# Patient Record
Sex: MALE | Race: WHITE | Employment: UNEMPLOYED | URBAN - METROPOLITAN AREA
[De-identification: names, ages, dates, MRNs, and addresses within clinical notes are randomized per-mention and may not be internally consistent; named-entity substitution may affect disease eponyms.]

---

## 2018-01-05 ENCOUNTER — APPOINTMENT (OUTPATIENT)
Dept: CT IMAGING | Age: 58
DRG: 062 | End: 2018-01-05

## 2018-01-05 ENCOUNTER — APPOINTMENT (OUTPATIENT)
Dept: GENERAL RADIOLOGY | Age: 58
DRG: 062 | End: 2018-01-05

## 2018-01-05 ENCOUNTER — HOSPITAL ENCOUNTER (INPATIENT)
Age: 58
LOS: 5 days | Discharge: NURSING, REHAB OR LONG-TERM CARE | DRG: 062 | End: 2018-01-10
Attending: EMERGENCY MEDICINE | Admitting: INTERNAL MEDICINE

## 2018-01-05 DIAGNOSIS — I63.512 ACUTE ISCHEMIC LEFT MCA STROKE (HCC): Primary | ICD-10-CM

## 2018-01-05 PROBLEM — R29.90 STROKE-LIKE SYMPTOM: Status: ACTIVE | Noted: 2018-01-05

## 2018-01-05 LAB
ABO/RH: NORMAL
ANION GAP SERPL CALCULATED.3IONS-SCNC: 15 MEQ/L (ref 7–13)
ANTIBODY SCREEN: NORMAL
APTT: 28.7 SEC (ref 21.6–35.4)
BUN BLDV-MCNC: 12 MG/DL (ref 6–20)
CALCIUM SERPL-MCNC: 9.3 MG/DL (ref 8.6–10.2)
CHLORIDE BLD-SCNC: 97 MEQ/L (ref 98–107)
CO2: 28 MEQ/L (ref 22–29)
CREAT SERPL-MCNC: 0.84 MG/DL (ref 0.7–1.2)
ETHANOL PERCENT: NORMAL G/DL
ETHANOL: <10 MG/DL (ref 0–0.08)
GFR AFRICAN AMERICAN: >60
GFR NON-AFRICAN AMERICAN: >60
GLUCOSE BLD-MCNC: 145 MG/DL (ref 74–109)
HCT VFR BLD CALC: 48.2 % (ref 42–52)
HEMOGLOBIN: 16.6 G/DL (ref 14–18)
INR BLD: 1
MCH RBC QN AUTO: 30 PG (ref 27–31.3)
MCHC RBC AUTO-ENTMCNC: 34.4 % (ref 33–37)
MCV RBC AUTO: 87.1 FL (ref 80–100)
PDW BLD-RTO: 14 % (ref 11.5–14.5)
PLATELET # BLD: 176 K/UL (ref 130–400)
POTASSIUM SERPL-SCNC: 3.9 MEQ/L (ref 3.5–5.1)
PROTHROMBIN TIME: 10.5 SEC (ref 8.1–13.7)
RBC # BLD: 5.54 M/UL (ref 4.7–6.1)
SODIUM BLD-SCNC: 140 MEQ/L (ref 132–144)
TROPONIN: <0.01 NG/ML (ref 0–0.01)
WBC # BLD: 9.9 K/UL (ref 4.8–10.8)

## 2018-01-05 PROCEDURE — 6360000002 HC RX W HCPCS: Performed by: EMERGENCY MEDICINE

## 2018-01-05 PROCEDURE — 85027 COMPLETE CBC AUTOMATED: CPT

## 2018-01-05 PROCEDURE — 6360000002 HC RX W HCPCS: Performed by: INTERNAL MEDICINE

## 2018-01-05 PROCEDURE — 84484 ASSAY OF TROPONIN QUANT: CPT

## 2018-01-05 PROCEDURE — 70450 CT HEAD/BRAIN W/O DYE: CPT

## 2018-01-05 PROCEDURE — 96375 TX/PRO/DX INJ NEW DRUG ADDON: CPT

## 2018-01-05 PROCEDURE — 93005 ELECTROCARDIOGRAM TRACING: CPT

## 2018-01-05 PROCEDURE — 99285 EMERGENCY DEPT VISIT HI MDM: CPT

## 2018-01-05 PROCEDURE — 71045 X-RAY EXAM CHEST 1 VIEW: CPT

## 2018-01-05 PROCEDURE — 2500000003 HC RX 250 WO HCPCS: Performed by: EMERGENCY MEDICINE

## 2018-01-05 PROCEDURE — 96374 THER/PROPH/DIAG INJ IV PUSH: CPT

## 2018-01-05 PROCEDURE — 80048 BASIC METABOLIC PNL TOTAL CA: CPT

## 2018-01-05 PROCEDURE — 2580000003 HC RX 258: Performed by: EMERGENCY MEDICINE

## 2018-01-05 PROCEDURE — 85610 PROTHROMBIN TIME: CPT

## 2018-01-05 PROCEDURE — 6370000000 HC RX 637 (ALT 250 FOR IP): Performed by: INTERNAL MEDICINE

## 2018-01-05 PROCEDURE — 86850 RBC ANTIBODY SCREEN: CPT

## 2018-01-05 PROCEDURE — 86900 BLOOD TYPING SEROLOGIC ABO: CPT

## 2018-01-05 PROCEDURE — 86901 BLOOD TYPING SEROLOGIC RH(D): CPT

## 2018-01-05 PROCEDURE — G0480 DRUG TEST DEF 1-7 CLASSES: HCPCS

## 2018-01-05 PROCEDURE — 36415 COLL VENOUS BLD VENIPUNCTURE: CPT

## 2018-01-05 PROCEDURE — 2500000003 HC RX 250 WO HCPCS: Performed by: PSYCHIATRY & NEUROLOGY

## 2018-01-05 PROCEDURE — 2580000003 HC RX 258: Performed by: INTERNAL MEDICINE

## 2018-01-05 PROCEDURE — 85730 THROMBOPLASTIN TIME PARTIAL: CPT

## 2018-01-05 PROCEDURE — 2000000000 HC ICU R&B

## 2018-01-05 PROCEDURE — 3E03317 INTRODUCTION OF OTHER THROMBOLYTIC INTO PERIPHERAL VEIN, PERCUTANEOUS APPROACH: ICD-10-PCS | Performed by: EMERGENCY MEDICINE

## 2018-01-05 RX ORDER — LABETALOL HYDROCHLORIDE 5 MG/ML
10 INJECTION, SOLUTION INTRAVENOUS EVERY 4 HOURS PRN
Status: DISCONTINUED | OUTPATIENT
Start: 2018-01-05 | End: 2018-01-10 | Stop reason: HOSPADM

## 2018-01-05 RX ORDER — SODIUM CHLORIDE 0.9 % (FLUSH) 0.9 %
10 SYRINGE (ML) INJECTION PRN
Status: DISCONTINUED | OUTPATIENT
Start: 2018-01-05 | End: 2018-01-10 | Stop reason: HOSPADM

## 2018-01-05 RX ORDER — HYDRALAZINE HYDROCHLORIDE 20 MG/ML
INJECTION INTRAMUSCULAR; INTRAVENOUS
Status: DISPENSED
Start: 2018-01-05 | End: 2018-01-06

## 2018-01-05 RX ORDER — HYDROCHLOROTHIAZIDE 25 MG/1
25 TABLET ORAL DAILY
Status: ON HOLD | COMMUNITY
End: 2018-01-12 | Stop reason: HOSPADM

## 2018-01-05 RX ORDER — ONDANSETRON 2 MG/ML
4 INJECTION INTRAMUSCULAR; INTRAVENOUS EVERY 6 HOURS PRN
Status: DISCONTINUED | OUTPATIENT
Start: 2018-01-05 | End: 2018-01-10 | Stop reason: HOSPADM

## 2018-01-05 RX ORDER — DEXTROSE MONOHYDRATE 25 G/50ML
12.5 INJECTION, SOLUTION INTRAVENOUS
Status: DISPENSED | OUTPATIENT
Start: 2018-01-05 | End: 2018-01-05

## 2018-01-05 RX ORDER — LISINOPRIL 40 MG/1
40 TABLET ORAL DAILY
COMMUNITY
End: 2018-01-24 | Stop reason: SDUPTHER

## 2018-01-05 RX ORDER — ATORVASTATIN CALCIUM 40 MG/1
40 TABLET, FILM COATED ORAL NIGHTLY
Status: DISCONTINUED | OUTPATIENT
Start: 2018-01-05 | End: 2018-01-10 | Stop reason: HOSPADM

## 2018-01-05 RX ORDER — ACETAMINOPHEN 325 MG/1
650 TABLET ORAL EVERY 4 HOURS PRN
Status: DISCONTINUED | OUTPATIENT
Start: 2018-01-05 | End: 2018-01-10 | Stop reason: HOSPADM

## 2018-01-05 RX ORDER — LABETALOL HYDROCHLORIDE 5 MG/ML
10 INJECTION, SOLUTION INTRAVENOUS ONCE
Status: COMPLETED | OUTPATIENT
Start: 2018-01-05 | End: 2018-01-05

## 2018-01-05 RX ORDER — SODIUM CHLORIDE 0.9 % (FLUSH) 0.9 %
10 SYRINGE (ML) INJECTION EVERY 12 HOURS SCHEDULED
Status: DISCONTINUED | OUTPATIENT
Start: 2018-01-05 | End: 2018-01-10 | Stop reason: HOSPADM

## 2018-01-05 RX ORDER — SODIUM CHLORIDE 9 MG/ML
INJECTION, SOLUTION INTRAVENOUS
Status: DISPENSED
Start: 2018-01-05 | End: 2018-01-06

## 2018-01-05 RX ORDER — HYDRALAZINE HYDROCHLORIDE 20 MG/ML
10 INJECTION INTRAMUSCULAR; INTRAVENOUS EVERY 6 HOURS PRN
Status: DISCONTINUED | OUTPATIENT
Start: 2018-01-05 | End: 2018-01-07

## 2018-01-05 RX ADMIN — SODIUM CHLORIDE, PRESERVATIVE FREE 10 ML: 5 INJECTION INTRAVENOUS at 21:19

## 2018-01-05 RX ADMIN — LABETALOL HYDROCHLORIDE 10 MG: 5 INJECTION, SOLUTION INTRAVENOUS at 16:57

## 2018-01-05 RX ADMIN — ALTEPLASE 81 MG: KIT at 16:38

## 2018-01-05 RX ADMIN — LABETALOL HYDROCHLORIDE 10 MG: 5 INJECTION INTRAVENOUS at 21:36

## 2018-01-05 RX ADMIN — SODIUM CHLORIDE, PRESERVATIVE FREE 10 ML: 5 INJECTION INTRAVENOUS at 21:20

## 2018-01-05 RX ADMIN — ATORVASTATIN CALCIUM 40 MG: 40 TABLET, FILM COATED ORAL at 21:19

## 2018-01-05 RX ADMIN — ACETAMINOPHEN 650 MG: 325 TABLET, FILM COATED ORAL at 21:24

## 2018-01-05 RX ADMIN — HYDRALAZINE HYDROCHLORIDE 10 MG: 20 INJECTION INTRAMUSCULAR; INTRAVENOUS at 23:38

## 2018-01-05 ASSESSMENT — PAIN DESCRIPTION - LOCATION
LOCATION: HEAD
LOCATION: HEAD

## 2018-01-05 ASSESSMENT — PAIN SCALES - GENERAL
PAINLEVEL_OUTOF10: 0
PAINLEVEL_OUTOF10: 3
PAINLEVEL_OUTOF10: 0

## 2018-01-05 NOTE — ED TRIAGE NOTES
Per milelr. Pt stated that about an hour ago he began having a hard time finding words for what he wantewd and had confusion finding car keysthat were in front of him. The felt he might have had a slight right sided weakness but then it resolved.  Pt did have a noticeable right sided facial droop on arrival

## 2018-01-05 NOTE — ED NOTES
Bed: 19  Expected date: 1/5/18  Expected time:   Means of arrival:   Comments:  HTN, change in mental status     Minerva Hernandez  01/05/18 3949

## 2018-01-05 NOTE — ED PROVIDER NOTES
INFARCTION AND CLINICAL CORRELATION WARRANTED. 2. NO INTRACRANIAL MASS, HEMORRHAGE, \"MASS EFFECT\" OR MIDLINE SHIFT. 3. REMAINDER OF THE CT BRAIN APPEARS UNREMARKABLE. (CT brain report was discussed with Dr. Gallito Cox at 16:25 hour)      All CT scans at this facility use dose modulation, iterative reconstruction, and/or weight based dosing when appropriate to reduce radiation dose to as low as reasonably achievable. CT HEAD WO CONTRAST    (Results Pending)         ED Course as of Jan 05 1814   Solomon Smith Jan 05, 2018   1638 Dr. Arvella Cockayne present in the emergency department at 36, he was able to evaluate the patient. We agree this patient is still a candidate for TPA. History is somewhat limited because the patient is confused and no family member is currently present in the ED, however, we obtain history from EMS who states they obtained from the girlfriend present at the house. It was an acute onset within one hour. NIH 6 with clear clinical deficit. TPA was initiated at [id number] which was 30 minutes after arrival.  [TS]      ED Course User Index  [TS] Jona Landaverde DO       Patient received the TPA without incident, slight improvement noted. He will get CT scan to make sure he did not develop any bleed and then go up to ICU. I did speak with the hospitalist.  Dr. Arvella Cockayne where. Family present. Critical Care: The high probability of sudden, clinically significant deterioration in the patient's condition required the highest level of my preparedness to intervene urgently. The services I provided to this patient were to treat and/or prevent clinically significant deterioration.  Services included the following: chart data review, reviewing nursing notes and/or old charts, documentation time, consultant collaboration regarding findings and treatment options, medication orders and management, direct patient care, vital sign assessments and ordering, interpreting and reviewing diagnostic studies/lab

## 2018-01-05 NOTE — ED NOTES
[facility]  01/05/18 0415

## 2018-01-06 ENCOUNTER — APPOINTMENT (OUTPATIENT)
Dept: MRI IMAGING | Age: 58
DRG: 062 | End: 2018-01-06

## 2018-01-06 ENCOUNTER — APPOINTMENT (OUTPATIENT)
Dept: ULTRASOUND IMAGING | Age: 58
DRG: 062 | End: 2018-01-06

## 2018-01-06 ENCOUNTER — APPOINTMENT (OUTPATIENT)
Dept: CT IMAGING | Age: 58
DRG: 062 | End: 2018-01-06

## 2018-01-06 LAB
ANION GAP SERPL CALCULATED.3IONS-SCNC: 16 MEQ/L (ref 7–13)
BASOPHILS ABSOLUTE: 0.1 K/UL (ref 0–0.2)
BASOPHILS RELATIVE PERCENT: 0.9 %
BUN BLDV-MCNC: 13 MG/DL (ref 6–20)
CALCIUM SERPL-MCNC: 8.9 MG/DL (ref 8.6–10.2)
CHLORIDE BLD-SCNC: 97 MEQ/L (ref 98–107)
CHOLESTEROL, TOTAL: 165 MG/DL (ref 0–199)
CO2: 23 MEQ/L (ref 22–29)
CREAT SERPL-MCNC: 0.83 MG/DL (ref 0.7–1.2)
EOSINOPHILS ABSOLUTE: 0 K/UL (ref 0–0.7)
EOSINOPHILS RELATIVE PERCENT: 0.1 %
GFR AFRICAN AMERICAN: >60
GFR NON-AFRICAN AMERICAN: >60
GLUCOSE BLD-MCNC: 137 MG/DL (ref 74–109)
HBA1C MFR BLD: 5.5 % (ref 4.8–5.9)
HCT VFR BLD CALC: 44.1 % (ref 42–52)
HDLC SERPL-MCNC: 29 MG/DL (ref 40–59)
HEMOGLOBIN: 15.2 G/DL (ref 14–18)
INR BLD: 1.1
LDL CHOLESTEROL CALCULATED: 110 MG/DL (ref 0–129)
LV EF: 53 %
LVEF MODALITY: NORMAL
LYMPHOCYTES ABSOLUTE: 1.2 K/UL (ref 1–4.8)
LYMPHOCYTES RELATIVE PERCENT: 10.9 %
MCH RBC QN AUTO: 29.8 PG (ref 27–31.3)
MCHC RBC AUTO-ENTMCNC: 34.5 % (ref 33–37)
MCV RBC AUTO: 86.6 FL (ref 80–100)
MONOCYTES ABSOLUTE: 0.6 K/UL (ref 0.2–0.8)
MONOCYTES RELATIVE PERCENT: 5.4 %
NEUTROPHILS ABSOLUTE: 9 K/UL (ref 1.4–6.5)
NEUTROPHILS RELATIVE PERCENT: 82.7 %
PDW BLD-RTO: 14.2 % (ref 11.5–14.5)
PLATELET # BLD: 177 K/UL (ref 130–400)
POTASSIUM SERPL-SCNC: 3.7 MEQ/L (ref 3.5–5.1)
PROTHROMBIN TIME: 11.5 SEC (ref 8.1–13.7)
RBC # BLD: 5.09 M/UL (ref 4.7–6.1)
SODIUM BLD-SCNC: 136 MEQ/L (ref 132–144)
TRIGL SERPL-MCNC: 128 MG/DL (ref 0–200)
TROPONIN: 0.03 NG/ML (ref 0–0.01)
TROPONIN: 0.03 NG/ML (ref 0–0.01)
WBC # BLD: 10.9 K/UL (ref 4.8–10.8)

## 2018-01-06 PROCEDURE — 2700000000 HC OXYGEN THERAPY PER DAY

## 2018-01-06 PROCEDURE — 85610 PROTHROMBIN TIME: CPT

## 2018-01-06 PROCEDURE — 2580000003 HC RX 258: Performed by: EMERGENCY MEDICINE

## 2018-01-06 PROCEDURE — 70450 CT HEAD/BRAIN W/O DYE: CPT

## 2018-01-06 PROCEDURE — 70551 MRI BRAIN STEM W/O DYE: CPT

## 2018-01-06 PROCEDURE — 2000000000 HC ICU R&B

## 2018-01-06 PROCEDURE — 93880 EXTRACRANIAL BILAT STUDY: CPT

## 2018-01-06 PROCEDURE — 83036 HEMOGLOBIN GLYCOSYLATED A1C: CPT

## 2018-01-06 PROCEDURE — 2500000003 HC RX 250 WO HCPCS: Performed by: PSYCHIATRY & NEUROLOGY

## 2018-01-06 PROCEDURE — 85025 COMPLETE CBC W/AUTO DIFF WBC: CPT

## 2018-01-06 PROCEDURE — G8997 SWALLOW GOAL STATUS: HCPCS

## 2018-01-06 PROCEDURE — 84484 ASSAY OF TROPONIN QUANT: CPT

## 2018-01-06 PROCEDURE — G8996 SWALLOW CURRENT STATUS: HCPCS

## 2018-01-06 PROCEDURE — 6360000002 HC RX W HCPCS: Performed by: INTERNAL MEDICINE

## 2018-01-06 PROCEDURE — 36415 COLL VENOUS BLD VENIPUNCTURE: CPT

## 2018-01-06 PROCEDURE — 70544 MR ANGIOGRAPHY HEAD W/O DYE: CPT

## 2018-01-06 PROCEDURE — 6370000000 HC RX 637 (ALT 250 FOR IP): Performed by: INTERNAL MEDICINE

## 2018-01-06 PROCEDURE — 80061 LIPID PANEL: CPT

## 2018-01-06 PROCEDURE — 93306 TTE W/DOPPLER COMPLETE: CPT

## 2018-01-06 PROCEDURE — 2580000003 HC RX 258: Performed by: INTERNAL MEDICINE

## 2018-01-06 PROCEDURE — 92610 EVALUATE SWALLOWING FUNCTION: CPT

## 2018-01-06 PROCEDURE — 93005 ELECTROCARDIOGRAM TRACING: CPT

## 2018-01-06 PROCEDURE — 6370000000 HC RX 637 (ALT 250 FOR IP): Performed by: PSYCHIATRY & NEUROLOGY

## 2018-01-06 PROCEDURE — 80048 BASIC METABOLIC PNL TOTAL CA: CPT

## 2018-01-06 RX ORDER — LISINOPRIL 5 MG/1
5 TABLET ORAL DAILY
Status: DISCONTINUED | OUTPATIENT
Start: 2018-01-06 | End: 2018-01-07

## 2018-01-06 RX ADMIN — LISINOPRIL 5 MG: 5 TABLET ORAL at 14:27

## 2018-01-06 RX ADMIN — LABETALOL HYDROCHLORIDE 10 MG: 5 INJECTION INTRAVENOUS at 16:11

## 2018-01-06 RX ADMIN — LABETALOL HYDROCHLORIDE 10 MG: 5 INJECTION INTRAVENOUS at 04:05

## 2018-01-06 RX ADMIN — SODIUM CHLORIDE, PRESERVATIVE FREE 10 ML: 5 INJECTION INTRAVENOUS at 21:41

## 2018-01-06 RX ADMIN — LABETALOL HYDROCHLORIDE 10 MG: 5 INJECTION INTRAVENOUS at 23:08

## 2018-01-06 RX ADMIN — HYDRALAZINE HYDROCHLORIDE 10 MG: 20 INJECTION INTRAMUSCULAR; INTRAVENOUS at 18:01

## 2018-01-06 RX ADMIN — HYDRALAZINE HYDROCHLORIDE 10 MG: 20 INJECTION INTRAMUSCULAR; INTRAVENOUS at 06:11

## 2018-01-06 RX ADMIN — ATORVASTATIN CALCIUM 40 MG: 40 TABLET, FILM COATED ORAL at 21:40

## 2018-01-06 ASSESSMENT — PAIN SCALES - GENERAL
PAINLEVEL_OUTOF10: 0

## 2018-01-06 NOTE — PROGRESS NOTES
[facility] Occupational Therapy      Date: 2018  Patient Name: Jase Bishop        MRN: [id number]  Account: [de-identified]   : 1960  (65 y.o.)  Room: [place] [address]     Chart reviewed, attempted OT eval at 4:15 PM, but pt. unavailable 2° to:    [x] Hold, tPA administered at 18 16:38.     [] Pt declined     [] Pt. . off floor for test/procedure. Will attempt again when able.     Electronically signed by JANN Christina on 2018 at 4:15 PM

## 2018-01-06 NOTE — PROGRESS NOTES
Progress Note  NEUROLOGY  [facility] ICU       Patient: Peter Israel  Unit/Bed: FI68/SX12-09  YOB: 1960  MRN: [id number]  Acct: [de-identified]   Admitting Diagnosis: Stroke-like symptom [R29.90]  Admit Date:  1/5/2018  Hospital Day: 1    Subjective: Has remained neurologically stable, he continues to have a mixed aphasia. He does have a little headache. Overnight his blood pressure was quite elevated. Patient Seen, Chart, Labs, Radiology studies, and Consults reviewed. Objective:   BP (!) 176/69   Pulse 72   Temp 98.7 °F (37.1 °C) (Oral)   Resp 20   Wt 210 lb 1.6 oz (95.3 kg)   SpO2 97%     Intake/Output Summary (Last 24 hours) at 01/06/18 1457  Last data filed at 01/06/18 1009   Gross per 24 hour   Intake              450 ml   Output              600 ml   Net             -150 ml       Physical Exam:  GENERAL APPEARANCE: No distress, Prefers to keep his eyes closed, interactive and cooperative. MENTAL STATE: His aphasia is limiting. He attempts to follow commands, he is calm. He can follow mimed exam gestures. He cannot follow one-step commands reliably. He is a little more successful with overlearned phrases, however spontaneous expression and significantly impaired. Cannot name, cannot repeat. CRANIAL NERVES: Are normal  CN 2 Visual fields full to confrontation. CN 3, 4, 6 Pupils round, equally reactive to light. No ptosis. EOMs normal alignment No nystagmus. CN 5 Facial sensation intact bilaterally. CN 7 Normal and symmetric facial strength. Nasolabial folds symmetric. CN 8 untestable due to aphasia  CN 9 untestable due to aphasia. CN 11 does not follow command   CN 12 does not follow command    MOTOR:Muscle strength was 5/5 in distal and proximal muscles in both upper and lower extremities. No fasciculations, tremor or other abnormal movements were present.      SENSORY: Sensory exam was untestable to take aphasia    COORDINATION: Coordination exam was untestable due to aphasia        Medications:    lisinopril  5 mg Oral Daily    nicotine  1 patch Transdermal Daily    sodium chloride flush  10 mL Intravenous 2 times per day    sodium chloride flush  10 mL Intravenous 2 times per day    atorvastatin  40 mg Oral Nightly     Continuous Infusions:   PRN Meds:sodium chloride flush, sodium chloride flush, acetaminophen, magnesium hydroxide, ondansetron, labetalol, hydrALAZINE    LABS  CBC:   Recent Labs      01/05/18   1619  01/06/18   0532   WBC  9.9  10.9*   HGB  16.6  15.2   PLT  176  177     BMP:  Recent Labs      01/05/18   1615  01/06/18   0532   NA  140  136   K  3.9  3.7   CL  97*  97*   CO2  28  23   BUN  12  13   CREATININE  0.84  0.83   GLUCOSE  145*  137*     TSH:  No results for input(s): TSH in the last 72 hours. B12:  No results for input(s): Chichi Speller in the last 72 hours. Vit. D: No results for input(s): VITD25 in the last 72 hours. Lipids:   Lab Results   Component Value Date    CHOL 165 01/06/2018     Lab Results   Component Value Date    TRIG 128 01/06/2018     Lab Results   Component Value Date    HDL 29 (L) 01/06/2018     Lab Results   Component Value Date    LDLCALC 110 01/06/2018     No results found for: LABVLDL, VLDL  No results found for: CHOLHDLRATIO    Ammonia:No results for input(s): AMMONIA in the last 72 hours. LFT: No results for input(s): AST, ALT, ALB, BILITOT, ALKPHOS in the last 72 hours. Urine: No results for input(s): Dickie Duenweg, GLUCOSEU, BLOODU, PHUR, PROTEINU, UROBILINOGEN, LEUKOCYTESUR in the last 72 hours. Invalid input(s):  Rene Herzog,  SPECGRAV,  NITRU     Assessment/Plan:  Characteristics of diminished attention, mixed aphasia, normal motor exam except for maybe a questionable left alien hand, make me suspicious of a subcortical stroke for example a right thalamic or ischemia involving the head of the caudate, or he may have a few small embolic areas of stroke. He received tPA on 1/5/18.   We will proceed

## 2018-01-06 NOTE — PLAN OF CARE
Problem: IP SWALLOWING  Goal: STG - patient will tolerate recommended food and liquid consistencies without clinical signs and symptoms of aspirations  Outcome: Ongoing

## 2018-01-06 NOTE — PROGRESS NOTES
Peripheral Pulses: +2 palpable, equal bilaterally       Labs:   Recent Labs      01/05/18   1619  01/06/18   0532   WBC  9.9  10.9*   HGB  16.6  15.2   HCT  48.2  44.1   PLT  176  177     Recent Labs      01/05/18   1615  01/06/18   0532   NA  140  136   K  3.9  3.7   CL  97*  97*   CO2  28  23   BUN  12  13   CREATININE  0.84  0.83   CALCIUM  9.3  8.9     No results for input(s): AST, ALT, BILIDIR, BILITOT, ALKPHOS in the last 72 hours. Recent Labs      01/05/18   1615  01/06/18   0532   INR  1.0  1.1     Recent Labs      01/05/18   1615   TROPONINI  <0.010       Urinalysis:    No results found for: Alejandra Farah, BACTERIA, RBCUA, BLOODU, SPECGRAV, GLUCOSEU    Radiology:  CT HEAD WO CONTRAST   Final Result   Persistent area of low attenuation left parietal lobe the compatible with an area of infarction. No hemorrhagic transformation. Question possible left MCA clot sign. These findings were discussed with Dr. Cony Belle in the emergency room. XR CHEST PORTABLE   Final Result   MINIMAL BIBASILAR ATELECTASIS/SMALL INFILTRATES. CLINICAL CORRELATION AND FOLLOW-UP PLAIN FILM RECOMMENDED. CT Head WO Contrast   Final Result   1. VAGUE HYPOATTENUATION FOCUS IN LT PARIETAL PERIVENTRICULAR WHITE MATTER IS SUSPICIOUS FOR AN ACUTE OR SUBACUTE NONHEMORRHAGIC CEREBRAL INFARCTION AND CLINICAL CORRELATION WARRANTED. 2. NO INTRACRANIAL MASS, HEMORRHAGE, \"MASS EFFECT\" OR MIDLINE SHIFT. 3. REMAINDER OF THE CT BRAIN APPEARS UNREMARKABLE. (CT brain report was discussed with Dr. Cony Belle at 16:25 hour)      All CT scans at this facility use dose modulation, iterative reconstruction, and/or weight based dosing when appropriate to reduce radiation dose to as low as reasonably achievable.       US CAROTID ARTERY BILATERAL    (Results Pending)   MRA HEAD WO CONTRAST    (Results Pending)   MRI BRAIN WO CONTRAST    (Results Pending)           Assessment/Plan:    Active Hospital Problems    Diagnosis Date Noted  Stroke-like symptom [R29.90] 01/05/2018      # acute CVA- s/p tPA, repeat CT H not concerning for bleed, awaiting MRI/A head and carotic U/S, neurology is following  - tele, neuro-check, speech therapy, SLP evaluated and rec'ed Pureed with nectar thick liquids. Start statin  - f/u imaging as above   - treat BP >180/105 with prn meds ordered    # DVT PPx- hold AC until ok by neurology    # Dispo- ICU       Additional work up or/and treatment plan may be added today or then after based on clinical progression. I am managing a portion of pt care. Some medical issues are handled by other specialists. Additional work up and treatment should be done in out pt setting by pt PCP and other out pt providers. In addition to examining and evaluating pt, I spent additional time explaining care, normal and abnormal findings, and treatment plan. All of pt questions were answered. Counseling, diet and education were  provided. Case will be discussed with nursing staff when appropriate. Family will be updated if and when appropriate.       Diet: Diet NPO Effective Now    Code Status: Full Code    PT/OT Eval     Electronically signed by Faheem Gusman DO on 1/6/2018 at 1:42 PM

## 2018-01-07 LAB
ANION GAP SERPL CALCULATED.3IONS-SCNC: 18 MEQ/L (ref 7–13)
BUN BLDV-MCNC: 23 MG/DL (ref 6–20)
CALCIUM SERPL-MCNC: 8.7 MG/DL (ref 8.6–10.2)
CHLORIDE BLD-SCNC: 97 MEQ/L (ref 98–107)
CO2: 22 MEQ/L (ref 22–29)
CREAT SERPL-MCNC: 0.93 MG/DL (ref 0.7–1.2)
GFR AFRICAN AMERICAN: >60
GFR NON-AFRICAN AMERICAN: >60
GLUCOSE BLD-MCNC: 128 MG/DL (ref 74–109)
POTASSIUM SERPL-SCNC: 3.8 MEQ/L (ref 3.5–5.1)
SODIUM BLD-SCNC: 137 MEQ/L (ref 132–144)
TROPONIN: 0.01 NG/ML (ref 0–0.01)

## 2018-01-07 PROCEDURE — 2580000003 HC RX 258: Performed by: EMERGENCY MEDICINE

## 2018-01-07 PROCEDURE — G8987 SELF CARE CURRENT STATUS: HCPCS

## 2018-01-07 PROCEDURE — G8979 MOBILITY GOAL STATUS: HCPCS

## 2018-01-07 PROCEDURE — 97163 PT EVAL HIGH COMPLEX 45 MIN: CPT

## 2018-01-07 PROCEDURE — G8978 MOBILITY CURRENT STATUS: HCPCS

## 2018-01-07 PROCEDURE — G8988 SELF CARE GOAL STATUS: HCPCS

## 2018-01-07 PROCEDURE — 2000000000 HC ICU R&B

## 2018-01-07 PROCEDURE — 2580000003 HC RX 258

## 2018-01-07 PROCEDURE — 2500000003 HC RX 250 WO HCPCS: Performed by: INTERNAL MEDICINE

## 2018-01-07 PROCEDURE — 97166 OT EVAL MOD COMPLEX 45 MIN: CPT

## 2018-01-07 PROCEDURE — 51702 INSERT TEMP BLADDER CATH: CPT

## 2018-01-07 PROCEDURE — 2580000003 HC RX 258: Performed by: INTERNAL MEDICINE

## 2018-01-07 PROCEDURE — 80048 BASIC METABOLIC PNL TOTAL CA: CPT

## 2018-01-07 PROCEDURE — 6370000000 HC RX 637 (ALT 250 FOR IP): Performed by: PSYCHIATRY & NEUROLOGY

## 2018-01-07 PROCEDURE — 6360000002 HC RX W HCPCS: Performed by: INTERNAL MEDICINE

## 2018-01-07 PROCEDURE — 36415 COLL VENOUS BLD VENIPUNCTURE: CPT

## 2018-01-07 PROCEDURE — 84484 ASSAY OF TROPONIN QUANT: CPT

## 2018-01-07 PROCEDURE — 2500000003 HC RX 250 WO HCPCS: Performed by: PSYCHIATRY & NEUROLOGY

## 2018-01-07 RX ORDER — LISINOPRIL 10 MG/1
10 TABLET ORAL DAILY
Status: DISCONTINUED | OUTPATIENT
Start: 2018-01-08 | End: 2018-01-08

## 2018-01-07 RX ORDER — SODIUM CHLORIDE 9 MG/ML
INJECTION, SOLUTION INTRAVENOUS CONTINUOUS
Status: DISCONTINUED | OUTPATIENT
Start: 2018-01-07 | End: 2018-01-09

## 2018-01-07 RX ORDER — SODIUM CHLORIDE 9 MG/ML
INJECTION, SOLUTION INTRAVENOUS
Status: COMPLETED
Start: 2018-01-07 | End: 2018-01-07

## 2018-01-07 RX ORDER — ASPIRIN 81 MG/1
81 TABLET, CHEWABLE ORAL DAILY
Status: DISCONTINUED | OUTPATIENT
Start: 2018-01-07 | End: 2018-01-08

## 2018-01-07 RX ORDER — METOPROLOL TARTRATE 5 MG/5ML
5 INJECTION INTRAVENOUS EVERY 6 HOURS
Status: DISCONTINUED | OUTPATIENT
Start: 2018-01-07 | End: 2018-01-10 | Stop reason: HOSPADM

## 2018-01-07 RX ORDER — LISINOPRIL 5 MG/1
5 TABLET ORAL DAILY
Status: DISPENSED | OUTPATIENT
Start: 2018-01-07 | End: 2018-01-08

## 2018-01-07 RX ORDER — HYDRALAZINE HYDROCHLORIDE 20 MG/ML
20 INJECTION INTRAMUSCULAR; INTRAVENOUS EVERY 6 HOURS
Status: DISCONTINUED | OUTPATIENT
Start: 2018-01-07 | End: 2018-01-10 | Stop reason: HOSPADM

## 2018-01-07 RX ADMIN — SODIUM CHLORIDE: 9 INJECTION, SOLUTION INTRAVENOUS at 10:10

## 2018-01-07 RX ADMIN — METOPROLOL TARTRATE 5 MG: 5 INJECTION, SOLUTION INTRAVENOUS at 15:57

## 2018-01-07 RX ADMIN — HYDRALAZINE HYDROCHLORIDE 10 MG: 20 INJECTION INTRAMUSCULAR; INTRAVENOUS at 10:09

## 2018-01-07 RX ADMIN — LABETALOL HYDROCHLORIDE 10 MG: 5 INJECTION INTRAVENOUS at 22:29

## 2018-01-07 RX ADMIN — SODIUM CHLORIDE, PRESERVATIVE FREE 10 ML: 5 INJECTION INTRAVENOUS at 08:16

## 2018-01-07 RX ADMIN — SODIUM CHLORIDE: 9 INJECTION, SOLUTION INTRAVENOUS at 20:06

## 2018-01-07 RX ADMIN — METOPROLOL TARTRATE 5 MG: 5 INJECTION, SOLUTION INTRAVENOUS at 21:30

## 2018-01-07 RX ADMIN — HYDRALAZINE HYDROCHLORIDE 20 MG: 20 INJECTION INTRAMUSCULAR; INTRAVENOUS at 23:52

## 2018-01-07 RX ADMIN — HYDRALAZINE HYDROCHLORIDE 20 MG: 20 INJECTION INTRAMUSCULAR; INTRAVENOUS at 18:07

## 2018-01-07 RX ADMIN — SODIUM CHLORIDE, PRESERVATIVE FREE 10 ML: 5 INJECTION INTRAVENOUS at 08:15

## 2018-01-07 RX ADMIN — HYDRALAZINE HYDROCHLORIDE 10 MG: 20 INJECTION INTRAMUSCULAR; INTRAVENOUS at 00:05

## 2018-01-07 ASSESSMENT — PAIN SCALES - GENERAL
PAINLEVEL_OUTOF10: 0

## 2018-01-07 NOTE — PROGRESS NOTES
SENSORY: Sensory exam was untestable to take aphasia     COORDINATION: Coordination exam was untestable due to aphasia         Medications:    aspirin  81 mg Oral Daily    [START ON 1/8/2018] lisinopril  10 mg Oral Daily    lisinopril  5 mg Oral Daily    nicotine  1 patch Transdermal Daily    sodium chloride flush  10 mL Intravenous 2 times per day    sodium chloride flush  10 mL Intravenous 2 times per day    atorvastatin  40 mg Oral Nightly     Continuous Infusions:   sodium chloride 100 mL/hr at 01/07/18 1010     PRN Meds:sodium chloride flush, sodium chloride flush, acetaminophen, magnesium hydroxide, ondansetron, labetalol, hydrALAZINE    LABS  CBC:   Recent Labs      01/05/18   1619  01/06/18   0532   WBC  9.9  10.9*   HGB  16.6  15.2   PLT  176  177     BMP:  Recent Labs      01/05/18   1615  01/06/18   0532  01/07/18   0606   NA  140  136  137   K  3.9  3.7  3.8   CL  97*  97*  97*   CO2  28  23  22   BUN  12  13  23*   CREATININE  0.84  0.83  0.93   GLUCOSE  145*  137*  128*     TSH:  No results for input(s): TSH in the last 72 hours. B12:  No results for input(s): Amrita Ifrah in the last 72 hours. Vit. D: No results for input(s): VITD25 in the last 72 hours. Lipids:   Lab Results   Component Value Date    CHOL 165 01/06/2018     Lab Results   Component Value Date    TRIG 128 01/06/2018     Lab Results   Component Value Date    HDL 29 (L) 01/06/2018     Lab Results   Component Value Date    LDLCALC 110 01/06/2018     No results found for: LABVLDL, VLDL  No results found for: CHOLHDLRATIO    Ammonia:No results for input(s): AMMONIA in the last 72 hours. LFT: No results for input(s): AST, ALT, ALB, BILITOT, ALKPHOS in the last 72 hours. Urine: No results for input(s): Mckayla Cocks, GLUCOSEU, BLOODU, PHUR, PROTEINU, UROBILINOGEN, LEUKOCYTESUR in the last 72 hours.     Invalid input(s):  Johnrachael Wellington,  SPECGRAV,  NITRU     Assessment/Plan:  Characteristics of diminished

## 2018-01-07 NOTE — PLAN OF CARE
Problem: Falls - Risk of  Goal: Absence of falls  Outcome: Ongoing      Problem: Risk for Impaired Skin Integrity  Goal: Tissue integrity - skin and mucous membranes  Structural intactness and normal physiological function of skin and  mucous membranes.    Outcome: Ongoing      Problem: NEUROLOGICAL DEFICIT  Goal: Absence of continued neurologic deterioration signs and symptoms  Outcome: Ongoing      Problem: IP SWALLOWING  Goal: LTG - patient will tolerate the least restrictive diet consistency to allow for safe consumption of daily meals  Outcome: Ongoing    Goal: STG - patient will tolerate recommended food and liquid consistencies without clinical signs and symptoms of aspirations  Outcome: Ongoing

## 2018-01-07 NOTE — PROGRESS NOTES
treatment minutes:  20 minutes  Total treatment time/minutes:  20 minutes    Electronically signed by:    JANN Corrales  1/7/2018, 9:27 AM

## 2018-01-07 NOTE — PROGRESS NOTES
Hospitalist Progress Note      PCP: No primary care provider on file. Date of Admission: 1/5/2018    Chief Complaint:    Chief Complaint   Patient presents with    Altered Mental Status       HPI/ subjective   Pt is confused with dysarthria. Poor historian. Medications:  Reviewed    Infusion Medications    sodium chloride 100 mL/hr at 01/07/18 1010     Scheduled Medications    lisinopril  5 mg Oral Daily    nicotine  1 patch Transdermal Daily    sodium chloride flush  10 mL Intravenous 2 times per day    sodium chloride flush  10 mL Intravenous 2 times per day    atorvastatin  40 mg Oral Nightly     PRN Meds: sodium chloride flush, sodium chloride flush, acetaminophen, magnesium hydroxide, ondansetron, labetalol, hydrALAZINE      Intake/Output Summary (Last 24 hours) at 01/07/18 1057  Last data filed at 01/07/18 0600   Gross per 24 hour   Intake                0 ml   Output              425 ml   Net             -425 ml       Exam:    BP (!) 195/90   Pulse 63   Temp 99.5 °F (37.5 °C) (Oral)   Resp 19   Wt 210 lb 1.6 oz (95.3 kg)   SpO2 97%     General appearance: No apparent distress, appears stated age and cooperative. HEENT: Pupils equal, round, and reactive to light. Conjunctivae/corneas clear. Neck: Supple, with full range of motion. No jugular venous distention. Trachea midline. Respiratory:  Normal respiratory effort. Clear to auscultation, bilaterally without Rales/Wheezes/Rhonchi. Cardiovascular: Regular rate and rhythm with normal S1/S2 without murmurs, rubs or gallops. Abdomen: Soft, non-tender, non-distended with normal bowel sounds. Musculoskeletal: No clubbing, cyanosis or edema bilaterally. Full range of motion without deformity. Skin: Skin color, texture, turgor normal.  No rashes or lesions.   Neuro: mild right facial droop, word salad, difficulty finding words, difficulty with following commands, moving all extremities   Psychiatric: Alert and oriented, thought content done pending read, neurology is following  - tele, neuro-check, speech therapy, SLP evaluated and rec'ed Pureed with nectar thick liquids. Start statin  - MRI with lacunar infarct, acute vs subacute   - treat BP >180/105 with prn meds ordered    # DVT PPx- hold AC until ok by neurology    # Dispo- ICU, possible floor transfer today pending neurology rec's      Additional work up or/and treatment plan may be added today or then after based on clinical progression. I am managing a portion of pt care. Some medical issues are handled by other specialists. Additional work up and treatment should be done in out pt setting by pt PCP and other out pt providers. In addition to examining and evaluating pt, I spent additional time explaining care, normal and abnormal findings, and treatment plan. All of pt questions were answered. Counseling, diet and education were  provided. Case will be discussed with nursing staff when appropriate. Family will be updated if and when appropriate.       Diet: Diet NPO Effective Now    Code Status: Full Code    PT/OT Eval     Electronically signed by Anya Jane DO on 1/7/2018 at 10:57 AM

## 2018-01-07 NOTE — PROGRESS NOTES
Speech Language Pathology    NAME:  Dianelys Contreras  ROOM: [facility]14/[facility]14-01  :  1960  DATE: 2018      Speech Therapy attempted to see Dianelys Contreras on this date for a/an:    [] Bedside Swallow Evaluation   [x] Speech/Language Evaluation   [] Modified Barium Swallow   [] Treatment    Pt was unable to be seen due to patient:   [] Currently off unit   [] Refused   [x] Too lethargic to participate    [] NPO for testing   [] On Bipap   [x] Nursing Deferred: Pop Garza RN reported pt currently cannot remain alert and is not following commands. Pt is NPO due to unable to remain alert. To re-assess swallow as able and to assess speech/language  as able. [] Other:        Electronically signed by Florencio Henson.  CLAIRE Iglesias on 18 at 8:46 AM

## 2018-01-07 NOTE — PROGRESS NOTES
assistance  Supine to Sit: Moderate assistance  Sit to Supine: Contact guard assistance  Scooting: Moderate assistance  Comment: pt had difficulty following directions therefore physical assist was required primarily for tactile cueing for direction  Transfers  Sit to Stand: Minimal Assistance  Stand to sit: Minimal Assistance  Bed to Chair: Minimal assistance  Stand Pivot Transfers: Minimal Assistance  Ambulation  Ambulation?: Yes  WB Status: unrestricted  Ambulation 1  Surface: level tile  Device: No Device;Hand-Held Assist  Assistance: Minimal assistance  Quality of Gait: asymmetrical and imbalanced. ataxic gait  Distance: 5 feet  Comments: impulsive  Stairs/Curb  Stairs?: No     Balance  Posture: Fair  Sitting - Static: Good;-  Sitting - Dynamic: Good;-  Standing - Static: Fair;+  Standing - Dynamic: Fair  Comments: pt impulsive and unsafe, use tactile and verbal cues        Assessment   Body structures, Functions, Activity limitations: Decreased functional mobility ; Decreased endurance;Decreased ADL status; Decreased balance;Decreased strength;Decreased safe awareness;Decreased cognition  Assessment: Pt referred for difficulty walking s/p CVA. Pt requires min to mod assist due to the need for tactile cues for direction. Pt inconsistently follows directions and is very impulsive and unsafe. Progress as tolerated.    Treatment Diagnosis: difficulty walking  Prognosis: Good  Decision Making: High Complexity  Patient Education: PT POC  Barriers to Learning: cognition  REQUIRES PT FOLLOW UP: Yes  Activity Tolerance  Activity Tolerance: Patient limited by cognitive status     Discharge Recommendations:  Continue to assess pending progress      Plan   Plan  Times per week: 3-6  Current Treatment Recommendations: Strengthening, Neuromuscular Re-education, Home Exercise Program, Balance Training, Safety Education & Training, Manual Therapy - Soft Tissue Mobilization, Endurance Training, Patient/Caregiver Education &

## 2018-01-08 LAB
ANION GAP SERPL CALCULATED.3IONS-SCNC: 17 MEQ/L (ref 7–13)
BUN BLDV-MCNC: 29 MG/DL (ref 6–20)
C-REACTIVE PROTEIN: 15.2 MG/L (ref 0–5)
CALCIUM SERPL-MCNC: 8.6 MG/DL (ref 8.6–10.2)
CHLORIDE BLD-SCNC: 102 MEQ/L (ref 98–107)
CO2: 22 MEQ/L (ref 22–29)
CREAT SERPL-MCNC: 0.81 MG/DL (ref 0.7–1.2)
GFR AFRICAN AMERICAN: >60
GFR NON-AFRICAN AMERICAN: >60
GLUCOSE BLD-MCNC: 108 MG/DL (ref 74–109)
HCT VFR BLD CALC: 43.7 % (ref 42–52)
HEMOGLOBIN: 14.7 G/DL (ref 14–18)
MCH RBC QN AUTO: 29.5 PG (ref 27–31.3)
MCHC RBC AUTO-ENTMCNC: 33.5 % (ref 33–37)
MCV RBC AUTO: 88.1 FL (ref 80–100)
PDW BLD-RTO: 14.8 % (ref 11.5–14.5)
PLATELET # BLD: 174 K/UL (ref 130–400)
POTASSIUM SERPL-SCNC: 3.8 MEQ/L (ref 3.5–5.1)
RBC # BLD: 4.96 M/UL (ref 4.7–6.1)
SEDIMENTATION RATE, ERYTHROCYTE: 25 MM (ref 0–20)
SODIUM BLD-SCNC: 141 MEQ/L (ref 132–144)
TOTAL CK: 68 U/L (ref 0–190)
TROPONIN: 0.01 NG/ML (ref 0–0.01)
WBC # BLD: 10.4 K/UL (ref 4.8–10.8)

## 2018-01-08 PROCEDURE — 6370000000 HC RX 637 (ALT 250 FOR IP): Performed by: INTERNAL MEDICINE

## 2018-01-08 PROCEDURE — 97535 SELF CARE MNGMENT TRAINING: CPT

## 2018-01-08 PROCEDURE — 36415 COLL VENOUS BLD VENIPUNCTURE: CPT

## 2018-01-08 PROCEDURE — 6360000002 HC RX W HCPCS: Performed by: INTERNAL MEDICINE

## 2018-01-08 PROCEDURE — 93010 ELECTROCARDIOGRAM REPORT: CPT | Performed by: INTERNAL MEDICINE

## 2018-01-08 PROCEDURE — 93005 ELECTROCARDIOGRAM TRACING: CPT

## 2018-01-08 PROCEDURE — 97116 GAIT TRAINING THERAPY: CPT

## 2018-01-08 PROCEDURE — 2500000003 HC RX 250 WO HCPCS: Performed by: INTERNAL MEDICINE

## 2018-01-08 PROCEDURE — 86140 C-REACTIVE PROTEIN: CPT

## 2018-01-08 PROCEDURE — 1210000000 HC MED SURG R&B

## 2018-01-08 PROCEDURE — 2580000003 HC RX 258: Performed by: INTERNAL MEDICINE

## 2018-01-08 PROCEDURE — 6370000000 HC RX 637 (ALT 250 FOR IP): Performed by: PSYCHIATRY & NEUROLOGY

## 2018-01-08 PROCEDURE — 2580000003 HC RX 258: Performed by: EMERGENCY MEDICINE

## 2018-01-08 PROCEDURE — 85027 COMPLETE CBC AUTOMATED: CPT

## 2018-01-08 PROCEDURE — 84484 ASSAY OF TROPONIN QUANT: CPT

## 2018-01-08 PROCEDURE — 82550 ASSAY OF CK (CPK): CPT

## 2018-01-08 PROCEDURE — 51702 INSERT TEMP BLADDER CATH: CPT

## 2018-01-08 PROCEDURE — 92526 ORAL FUNCTION THERAPY: CPT

## 2018-01-08 PROCEDURE — G8997 SWALLOW GOAL STATUS: HCPCS

## 2018-01-08 PROCEDURE — G8996 SWALLOW CURRENT STATUS: HCPCS

## 2018-01-08 PROCEDURE — 85652 RBC SED RATE AUTOMATED: CPT

## 2018-01-08 PROCEDURE — 80048 BASIC METABOLIC PNL TOTAL CA: CPT

## 2018-01-08 RX ORDER — METOPROLOL SUCCINATE 50 MG/1
50 TABLET, EXTENDED RELEASE ORAL DAILY
Status: DISCONTINUED | OUTPATIENT
Start: 2018-01-08 | End: 2018-01-09

## 2018-01-08 RX ORDER — ASPIRIN 81 MG/1
81 TABLET ORAL DAILY
Status: DISCONTINUED | OUTPATIENT
Start: 2018-01-08 | End: 2018-01-10 | Stop reason: HOSPADM

## 2018-01-08 RX ORDER — LISINOPRIL 20 MG/1
20 TABLET ORAL DAILY
Status: DISCONTINUED | OUTPATIENT
Start: 2018-01-09 | End: 2018-01-10

## 2018-01-08 RX ADMIN — SODIUM CHLORIDE: 9 INJECTION, SOLUTION INTRAVENOUS at 23:46

## 2018-01-08 RX ADMIN — METOPROLOL TARTRATE 5 MG: 5 INJECTION, SOLUTION INTRAVENOUS at 17:36

## 2018-01-08 RX ADMIN — HYDRALAZINE HYDROCHLORIDE 20 MG: 20 INJECTION INTRAMUSCULAR; INTRAVENOUS at 23:41

## 2018-01-08 RX ADMIN — SODIUM CHLORIDE: 9 INJECTION, SOLUTION INTRAVENOUS at 15:16

## 2018-01-08 RX ADMIN — HYDRALAZINE HYDROCHLORIDE 20 MG: 20 INJECTION INTRAMUSCULAR; INTRAVENOUS at 07:29

## 2018-01-08 RX ADMIN — HYDRALAZINE HYDROCHLORIDE 20 MG: 20 INJECTION INTRAMUSCULAR; INTRAVENOUS at 20:28

## 2018-01-08 RX ADMIN — METOPROLOL SUCCINATE 50 MG: 50 TABLET, FILM COATED, EXTENDED RELEASE ORAL at 14:50

## 2018-01-08 RX ADMIN — LISINOPRIL 10 MG: 10 TABLET ORAL at 08:51

## 2018-01-08 RX ADMIN — ASPIRIN 81 MG 81 MG: 81 TABLET ORAL at 08:51

## 2018-01-08 RX ADMIN — METOPROLOL TARTRATE 5 MG: 5 INJECTION, SOLUTION INTRAVENOUS at 05:03

## 2018-01-08 RX ADMIN — ATORVASTATIN CALCIUM 40 MG: 40 TABLET, FILM COATED ORAL at 20:28

## 2018-01-08 RX ADMIN — SODIUM CHLORIDE, PRESERVATIVE FREE 10 ML: 5 INJECTION INTRAVENOUS at 08:56

## 2018-01-08 RX ADMIN — METOPROLOL TARTRATE 5 MG: 5 INJECTION, SOLUTION INTRAVENOUS at 23:40

## 2018-01-08 ASSESSMENT — PAIN SCALES - GENERAL
PAINLEVEL_OUTOF10: 0
PAINLEVEL_OUTOF10: 0

## 2018-01-08 NOTE — FLOWSHEET NOTE
Shift summary    Pt alert and oriented times 2. Slightly impulsive at times but redirectable at times. Has been up in the chair this entire shift. BM x3 so far this shift.  Report called to [address]

## 2018-01-08 NOTE — PROGRESS NOTES
Hospitalist Progress Note      PCP: No primary care provider on file. Date of Admission: 1/5/2018    Chief Complaint:    Chief Complaint   Patient presents with    Altered Mental Status       HPI/ subjective   Pt seems much better today, he doesn't have any new complaints. Poor historian. Medications:  Reviewed    Infusion Medications    sodium chloride 100 mL/hr at 01/07/18 2006     Scheduled Medications    aspirin  81 mg Oral Daily    metoprolol succinate  50 mg Oral Daily    [START ON 1/9/2018] lisinopril  20 mg Oral Daily    metoprolol  5 mg Intravenous Q6H    hydrALAZINE  20 mg Intravenous Q6H    nicotine  1 patch Transdermal Daily    sodium chloride flush  10 mL Intravenous 2 times per day    sodium chloride flush  10 mL Intravenous 2 times per day    atorvastatin  40 mg Oral Nightly     PRN Meds: sodium chloride flush, sodium chloride flush, acetaminophen, magnesium hydroxide, ondansetron, labetalol      Intake/Output Summary (Last 24 hours) at 01/08/18 1357  Last data filed at 01/08/18 0856   Gross per 24 hour   Intake          2295.13 ml   Output              950 ml   Net          1345.13 ml       Exam:    /68   Pulse 61   Temp 97.6 °F (36.4 °C) (Oral)   Resp 22   Ht 5' 7\" (1.702 m)   Wt 210 lb 1.6 oz (95.3 kg)   SpO2 99%     General appearance: No apparent distress, appears stated age and cooperative. HEENT: Pupils equal, round, and reactive to light. Conjunctivae/corneas clear. Neck: Supple, with full range of motion. No jugular venous distention. Trachea midline. Respiratory:  Normal respiratory effort. Clear to auscultation, bilaterally without Rales/Wheezes/Rhonchi. Cardiovascular: Regular rate and rhythm with normal S1/S2 without murmurs, rubs or gallops. Abdomen: Soft, non-tender, non-distended with normal bowel sounds. Musculoskeletal: No clubbing, cyanosis or edema bilaterally. Full range of motion without deformity.   Skin: Skin color, texture, turgor normal.  No rashes or lesions. Neuro: getting much better, AOx2 (per sister this is his baseline), moving all ext, speech is much better, strength 5/5 throughout, walking with assist of one person   Psychiatric: Alert and oriented, thought content appropriate, normal insight  Capillary Refill: Brisk,< 3 seconds   Peripheral Pulses: +2 palpable, equal bilaterally       Labs:   Recent Labs      01/05/18   1619  01/06/18   0532  01/08/18   0606   WBC  9.9  10.9*  10.4   HGB  16.6  15.2  14.7   HCT  48.2  44.1  43.7   PLT  176  177  174     Recent Labs      01/06/18   0532  01/07/18   0606  01/08/18   0606   NA  136  137  141   K  3.7  3.8  3.8   CL  97*  97*  102   CO2  23  22  22   BUN  13  23*  29*   CREATININE  0.83  0.93  0.81   CALCIUM  8.9  8.7  8.6     No results for input(s): AST, ALT, BILIDIR, BILITOT, ALKPHOS in the last 72 hours. Recent Labs      01/05/18   1615  01/06/18   0532   INR  1.0  1.1     Recent Labs      01/06/18   2200  01/07/18   1454  01/08/18   0606   CKTOTAL   --    --   68   TROPONINI  0.028*  0.014*  0.012*       Urinalysis:    No results found for: [place], BACTERIA, RBCUA, BLOODU, SPECGRAV, GLUCOSEU    Radiology:  US CAROTID ARTERY BILATERAL   Final Result   1. RIGHT INTERNAL CAROTID ARTERY: <50% STENOSIS. 2. LEFT INTERNAL CAROTID ARTERY: <50% STENOSIS. 3. MINIMAL CAROTID PLAQUE BURDEN IN BOTH CAROTID BIFURCATIONS IN THE NECK. 4. SUBOPTIMAL VISUALIZATION OF THE VERTEBRAL ARTERIES IN THE NECK. Validated velocity measurements with angiographic measurements, velocity criteria are extrapolated from diameter data as defined by the Society of Radiologist in [address] [place] [address] Drive Radiology 2003; [id number]\". CT HEAD WO CONTRAST   Final Result   1. PREVIOUSLY REPORTED VAGUE HYPOATTENUATION FOCUS IN LEFT PARIETAL LOBE IS UNCHANGED. 2. AN ADDITIONAL EQUIVOCAL VAGUE HYPOATTENUATION FOCUS IN RIGHT BASAL GANGLIA IS NOW SEEN.    3. NO ACUTE TERRITORIAL CEREBRAL INFARCTION IS VISUALIZED. 4. OTHERWISE, NO SIGNIFICANT CHANGE SINCE THE PRIOR CT BRAIN SCANS. All CT scans at this facility use dose modulation, iterative reconstruction, and/or weight based dosing when appropriate to reduce radiation dose to as low as reasonably achievable. MRA HEAD WO CONTRAST   Final Result   NEGATIVE MRA HEAD WITHOUT IV CONTRAST. MRI BRAIN WO CONTRAST   Final Result   1. Equivocal small restricted diffusion focus in the right basal ganglia raises the possibility of an acute or subacute small \"lacunar\" infarction within the right basal ganglia and clinical correlation warranted. 2. No restricted diffusion in the left cerebral hemisphere or elsewhere in the brain. 3. Bilateral nonspecific punctate and patchy white matter T2 hyperintense foci and differential considerations include a sequela of small vessel disease or vasculopathy, vasculitis and demyelinating disease. 4. No intracranial mass, hemorrhage, \"mass effect\" or midline shift. CT HEAD WO CONTRAST   Final Result   Persistent area of low attenuation left parietal lobe the compatible with an area of infarction. No hemorrhagic transformation. Question possible left MCA clot sign. These findings were discussed with Dr. Austen Block in the emergency room. XR CHEST PORTABLE   Final Result   MINIMAL BIBASILAR ATELECTASIS/SMALL INFILTRATES. CLINICAL CORRELATION AND FOLLOW-UP PLAIN FILM RECOMMENDED. CT Head WO Contrast   Final Result   1. VAGUE HYPOATTENUATION FOCUS IN LT PARIETAL PERIVENTRICULAR WHITE MATTER IS SUSPICIOUS FOR AN ACUTE OR SUBACUTE NONHEMORRHAGIC CEREBRAL INFARCTION AND CLINICAL CORRELATION WARRANTED. 2. NO INTRACRANIAL MASS, HEMORRHAGE, \"MASS EFFECT\" OR MIDLINE SHIFT. 3. REMAINDER OF THE CT BRAIN APPEARS UNREMARKABLE.       (CT brain report was discussed with Dr. Austen Block at 16:25 hour)      All CT scans at this facility use dose modulation, iterative

## 2018-01-08 NOTE — PROGRESS NOTES
Speech Language Pathology    NAME:  Mary Espinal  ROOM: [facility]14/[facility]14-01  :  1960  DATE: 2018      Speech Therapy attempted to see Mary Espinal on this date for a/an:    [] Bedside Swallow Evaluation   [x] Speech/Language Evaluation   [] Modified Barium Swallow   [] Treatment    Pt was unable to be seen due to patient:   [] Currently off unit   [] Refused   [] Too lethargic to participate    [] NPO for testing   [] On Bipap   [] Nursing Deferred:   [x] Other: Following completion of BSE, pt stated he needed to use the restroom         Electronically signed by CLAIRE Zaragoza on 18 at 9:22 AM

## 2018-01-08 NOTE — PROGRESS NOTES
Patient was seen on critical care rounds.   Continues to improve and will be transferred to the floor

## 2018-01-09 ENCOUNTER — APPOINTMENT (OUTPATIENT)
Dept: NON INVASIVE DIAGNOSTICS | Age: 58
DRG: 062 | End: 2018-01-09

## 2018-01-09 ENCOUNTER — APPOINTMENT (OUTPATIENT)
Dept: NUCLEAR MEDICINE | Age: 58
DRG: 062 | End: 2018-01-09

## 2018-01-09 LAB
ALBUMIN SERPL-MCNC: 3.3 G/DL (ref 3.9–4.9)
ALP BLD-CCNC: 48 U/L (ref 35–104)
ALT SERPL-CCNC: 6 U/L (ref 0–41)
ANION GAP SERPL CALCULATED.3IONS-SCNC: 13 MEQ/L (ref 7–13)
AST SERPL-CCNC: 9 U/L (ref 0–40)
BILIRUB SERPL-MCNC: 0.3 MG/DL (ref 0–1.2)
BUN BLDV-MCNC: 27 MG/DL (ref 6–20)
CALCIUM SERPL-MCNC: 8 MG/DL (ref 8.6–10.2)
CHLORIDE BLD-SCNC: 105 MEQ/L (ref 98–107)
CO2: 23 MEQ/L (ref 22–29)
CREAT SERPL-MCNC: 0.73 MG/DL (ref 0.7–1.2)
GFR AFRICAN AMERICAN: >60
GFR NON-AFRICAN AMERICAN: >60
GLOBULIN: 2.3 G/DL (ref 2.3–3.5)
GLUCOSE BLD-MCNC: 168 MG/DL (ref 60–115)
GLUCOSE BLD-MCNC: 177 MG/DL (ref 60–115)
GLUCOSE BLD-MCNC: 99 MG/DL (ref 74–109)
HCT VFR BLD CALC: 41.1 % (ref 42–52)
HEMOGLOBIN: 13.5 G/DL (ref 14–18)
MAGNESIUM: 2.5 MG/DL (ref 1.7–2.3)
MCH RBC QN AUTO: 29.5 PG (ref 27–31.3)
MCHC RBC AUTO-ENTMCNC: 32.8 % (ref 33–37)
MCV RBC AUTO: 89.9 FL (ref 80–100)
PDW BLD-RTO: 14.8 % (ref 11.5–14.5)
PERFORMED ON: ABNORMAL
PERFORMED ON: ABNORMAL
PLATELET # BLD: 152 K/UL (ref 130–400)
POTASSIUM SERPL-SCNC: 3.7 MEQ/L (ref 3.5–5.1)
RBC # BLD: 4.57 M/UL (ref 4.7–6.1)
SODIUM BLD-SCNC: 141 MEQ/L (ref 132–144)
TOTAL PROTEIN: 5.6 G/DL (ref 6.4–8.1)
TROPONIN: 0.03 NG/ML (ref 0–0.01)
WBC # BLD: 8.4 K/UL (ref 4.8–10.8)

## 2018-01-09 PROCEDURE — 83735 ASSAY OF MAGNESIUM: CPT

## 2018-01-09 PROCEDURE — 6370000000 HC RX 637 (ALT 250 FOR IP): Performed by: INTERNAL MEDICINE

## 2018-01-09 PROCEDURE — 78452 HT MUSCLE IMAGE SPECT MULT: CPT

## 2018-01-09 PROCEDURE — 6370000000 HC RX 637 (ALT 250 FOR IP): Performed by: PHYSICIAN ASSISTANT

## 2018-01-09 PROCEDURE — 85027 COMPLETE CBC AUTOMATED: CPT

## 2018-01-09 PROCEDURE — A9502 TC99M TETROFOSMIN: HCPCS | Performed by: INTERNAL MEDICINE

## 2018-01-09 PROCEDURE — 51702 INSERT TEMP BLADDER CATH: CPT

## 2018-01-09 PROCEDURE — 97112 NEUROMUSCULAR REEDUCATION: CPT

## 2018-01-09 PROCEDURE — 6360000002 HC RX W HCPCS: Performed by: INTERNAL MEDICINE

## 2018-01-09 PROCEDURE — 2580000003 HC RX 258: Performed by: INTERNAL MEDICINE

## 2018-01-09 PROCEDURE — 93005 ELECTROCARDIOGRAM TRACING: CPT

## 2018-01-09 PROCEDURE — 3430000000 HC RX DIAGNOSTIC RADIOPHARMACEUTICAL: Performed by: INTERNAL MEDICINE

## 2018-01-09 PROCEDURE — 1210000000 HC MED SURG R&B

## 2018-01-09 PROCEDURE — 80053 COMPREHEN METABOLIC PANEL: CPT

## 2018-01-09 PROCEDURE — 2500000003 HC RX 250 WO HCPCS: Performed by: INTERNAL MEDICINE

## 2018-01-09 PROCEDURE — 36415 COLL VENOUS BLD VENIPUNCTURE: CPT

## 2018-01-09 PROCEDURE — 97116 GAIT TRAINING THERAPY: CPT

## 2018-01-09 PROCEDURE — 84484 ASSAY OF TROPONIN QUANT: CPT

## 2018-01-09 PROCEDURE — 6370000000 HC RX 637 (ALT 250 FOR IP): Performed by: PSYCHIATRY & NEUROLOGY

## 2018-01-09 PROCEDURE — 93017 CV STRESS TEST TRACING ONLY: CPT

## 2018-01-09 RX ORDER — METOPROLOL SUCCINATE 50 MG/1
50 TABLET, EXTENDED RELEASE ORAL 2 TIMES DAILY
Status: DISCONTINUED | OUTPATIENT
Start: 2018-01-09 | End: 2018-01-10 | Stop reason: HOSPADM

## 2018-01-09 RX ORDER — DEXTROSE MONOHYDRATE 50 MG/ML
100 INJECTION, SOLUTION INTRAVENOUS PRN
Status: DISCONTINUED | OUTPATIENT
Start: 2018-01-09 | End: 2018-01-10 | Stop reason: HOSPADM

## 2018-01-09 RX ORDER — DEXTROSE MONOHYDRATE 25 G/50ML
12.5 INJECTION, SOLUTION INTRAVENOUS PRN
Status: DISCONTINUED | OUTPATIENT
Start: 2018-01-09 | End: 2018-01-10 | Stop reason: HOSPADM

## 2018-01-09 RX ORDER — NICOTINE POLACRILEX 4 MG
15 LOZENGE BUCCAL PRN
Status: DISCONTINUED | OUTPATIENT
Start: 2018-01-09 | End: 2018-01-10 | Stop reason: HOSPADM

## 2018-01-09 RX ORDER — AMLODIPINE BESYLATE 5 MG/1
5 TABLET ORAL DAILY
Status: DISCONTINUED | OUTPATIENT
Start: 2018-01-09 | End: 2018-01-10

## 2018-01-09 RX ORDER — SODIUM CHLORIDE 0.9 % (FLUSH) 0.9 %
10 SYRINGE (ML) INJECTION PRN
Status: DISCONTINUED | OUTPATIENT
Start: 2018-01-09 | End: 2018-01-10 | Stop reason: HOSPADM

## 2018-01-09 RX ADMIN — METOPROLOL TARTRATE 5 MG: 5 INJECTION, SOLUTION INTRAVENOUS at 05:34

## 2018-01-09 RX ADMIN — HYDRALAZINE HYDROCHLORIDE 20 MG: 20 INJECTION INTRAMUSCULAR; INTRAVENOUS at 05:34

## 2018-01-09 RX ADMIN — SODIUM CHLORIDE, PRESERVATIVE FREE 10 ML: 5 INJECTION INTRAVENOUS at 20:59

## 2018-01-09 RX ADMIN — HYDRALAZINE HYDROCHLORIDE 20 MG: 20 INJECTION INTRAMUSCULAR; INTRAVENOUS at 14:09

## 2018-01-09 RX ADMIN — ATORVASTATIN CALCIUM 40 MG: 40 TABLET, FILM COATED ORAL at 20:58

## 2018-01-09 RX ADMIN — AMLODIPINE BESYLATE 5 MG: 5 TABLET ORAL at 15:29

## 2018-01-09 RX ADMIN — TETROFOSMIN 11.8 MILLICURIE: 0.23 INJECTION, POWDER, LYOPHILIZED, FOR SOLUTION INTRAVENOUS at 07:15

## 2018-01-09 RX ADMIN — METOPROLOL TARTRATE 5 MG: 5 INJECTION, SOLUTION INTRAVENOUS at 18:08

## 2018-01-09 RX ADMIN — SODIUM CHLORIDE 100 ML/HR: 9 INJECTION, SOLUTION INTRAVENOUS at 11:55

## 2018-01-09 RX ADMIN — Medication 30 ML: at 10:41

## 2018-01-09 RX ADMIN — INSULIN LISPRO 1 UNITS: 100 INJECTION, SOLUTION INTRAVENOUS; SUBCUTANEOUS at 22:17

## 2018-01-09 RX ADMIN — METOPROLOL TARTRATE 5 MG: 5 INJECTION, SOLUTION INTRAVENOUS at 23:47

## 2018-01-09 RX ADMIN — METOPROLOL SUCCINATE 50 MG: 50 TABLET, EXTENDED RELEASE ORAL at 20:58

## 2018-01-09 RX ADMIN — METOPROLOL TARTRATE 5 MG: 5 INJECTION, SOLUTION INTRAVENOUS at 11:51

## 2018-01-09 RX ADMIN — METOPROLOL SUCCINATE 50 MG: 50 TABLET, FILM COATED, EXTENDED RELEASE ORAL at 11:55

## 2018-01-09 RX ADMIN — LISINOPRIL 20 MG: 20 TABLET ORAL at 17:19

## 2018-01-09 RX ADMIN — HYDRALAZINE HYDROCHLORIDE 20 MG: 20 INJECTION INTRAMUSCULAR; INTRAVENOUS at 20:58

## 2018-01-09 RX ADMIN — Medication 10 ML: at 07:15

## 2018-01-09 RX ADMIN — ASPIRIN 81 MG: 81 TABLET, COATED ORAL at 11:53

## 2018-01-09 RX ADMIN — TETROFOSMIN 31.7 MILLICURIE: 0.23 INJECTION, POWDER, LYOPHILIZED, FOR SOLUTION INTRAVENOUS at 10:08

## 2018-01-09 NOTE — PROGRESS NOTES
4.57 (L) 4.70 - 6.10 M/uL    Hemoglobin 13.5 (L) 14.0 - 18.0 g/dL    Hematocrit 41.1 (L) 42.0 - 52.0 %    MCV 89.9 80.0 - 100.0 fL    MCH 29.5 27.0 - 31.3 pg    MCHC 32.8 (L) 33.0 - 37.0 %    RDW 14.8 (H) 11.5 - 14.5 %    Platelets 331 744 - 837 K/uL   Comprehensive Metabolic Panel    Collection Time: 01/09/18  5:16 AM   Result Value Ref Range    Sodium 141 132 - 144 mEq/L    Potassium 3.7 3.5 - 5.1 mEq/L    Chloride 105 98 - 107 mEq/L    CO2 23 22 - 29 mEq/L    Anion Gap 13 7 - 13 mEq/L    Glucose 99 74 - 109 mg/dL    BUN 27 (H) 6 - 20 mg/dL    CREATININE 0.73 0.70 - 1.20 mg/dL    GFR Non-African American >60.0 >60    GFR  >60.0 >60    Calcium 8.0 (L) 8.6 - 10.2 mg/dL    Total Protein 5.6 (L) 6.4 - 8.1 g/dL    Alb 3.3 (L) 3.9 - 4.9 g/dL    Total Bilirubin 0.3 0.0 - 1.2 mg/dL    Alkaline Phosphatase 48 35 - 104 U/L    ALT 6 0 - 41 U/L    AST 9 0 - 40 U/L    Globulin 2.3 2.3 - 3.5 g/dL   Troponin    Collection Time: 01/09/18  5:16 AM   Result Value Ref Range    Troponin 0.027 (HH) 0.000 - 0.010 ng/mL   EKG 12 Lead    Collection Time: 01/09/18  5:48 AM   Result Value Ref Range    Ventricular Rate 60 BPM    Atrial Rate 60 BPM    P-R Interval 146 ms    QRS Duration 152 ms    Q-T Interval 476 ms    QTc Calculation (Bazett) 476 ms    P Axis 50 degrees    R Axis -38 degrees    T Axis 165 degrees             CXR:     Pending     EKG:   Normal sinus rhythm   Left axis deviation   Right bundle branch block   Left ventricular hypertrophy with repolarization abnormality   Abnormal ECG     Echo:   Left ventricular ejection fraction is visually estimated at 50-55%. Normal left ventricular size and function. Moderate-to-severe concentric LVH. Normal diastolic function. Normal right ventricle structure and function. Normal right ventricle systolic pressure. Mildly dilated left atrium. Normal right atrium. Normal mitral valve structure and function. Normal tricuspid valve structure and function. .  Normal

## 2018-01-10 ENCOUNTER — HOSPITAL ENCOUNTER (INPATIENT)
Age: 58
LOS: 3 days | Discharge: HOME HEALTH | DRG: 057 | End: 2018-01-13
Attending: PHYSICAL MEDICINE & REHABILITATION | Admitting: PHYSICAL MEDICINE & REHABILITATION

## 2018-01-10 VITALS
DIASTOLIC BLOOD PRESSURE: 81 MMHG | TEMPERATURE: 98.1 F | WEIGHT: 210.1 LBS | HEART RATE: 61 BPM | SYSTOLIC BLOOD PRESSURE: 170 MMHG | HEIGHT: 67 IN | OXYGEN SATURATION: 100 % | RESPIRATION RATE: 18 BRPM

## 2018-01-10 PROBLEM — I63.511 CEREBROVASCULAR ACCIDENT (CVA) DUE TO OCCLUSION OF RIGHT MIDDLE CEREBRAL ARTERY (HCC): Status: ACTIVE | Noted: 2018-01-10

## 2018-01-10 PROBLEM — R26.9 GAIT ABNORMALITY: Status: ACTIVE | Noted: 2018-01-10

## 2018-01-10 PROBLEM — R41.4 HEMI-NEGLECT OF LEFT SIDE: Status: ACTIVE | Noted: 2018-01-10

## 2018-01-10 PROBLEM — I10 ESSENTIAL HYPERTENSION: Status: ACTIVE | Noted: 2018-01-10

## 2018-01-10 PROBLEM — R13.12 DYSPHAGIA, OROPHARYNGEAL PHASE: Status: ACTIVE | Noted: 2018-01-10

## 2018-01-10 LAB
ALBUMIN SERPL-MCNC: 3.2 G/DL (ref 3.9–4.9)
ALP BLD-CCNC: 48 U/L (ref 35–104)
ALT SERPL-CCNC: 8 U/L (ref 0–41)
ANION GAP SERPL CALCULATED.3IONS-SCNC: 12 MEQ/L (ref 7–13)
AST SERPL-CCNC: 10 U/L (ref 0–40)
BILIRUB SERPL-MCNC: 0.3 MG/DL (ref 0–1.2)
BILIRUBIN URINE: NEGATIVE
BLOOD, URINE: ABNORMAL
BUN BLDV-MCNC: 22 MG/DL (ref 6–20)
CALCIUM SERPL-MCNC: 8.2 MG/DL (ref 8.6–10.2)
CHLORIDE BLD-SCNC: 105 MEQ/L (ref 98–107)
CLARITY: CLEAR
CO2: 24 MEQ/L (ref 22–29)
COLOR: YELLOW
CREAT SERPL-MCNC: 0.81 MG/DL (ref 0.7–1.2)
EKG ATRIAL RATE: 55 BPM
EKG ATRIAL RATE: 60 BPM
EKG ATRIAL RATE: 70 BPM
EKG ATRIAL RATE: 83 BPM
EKG P AXIS: 33 DEGREES
EKG P AXIS: 45 DEGREES
EKG P AXIS: 45 DEGREES
EKG P AXIS: 50 DEGREES
EKG P-R INTERVAL: 136 MS
EKG P-R INTERVAL: 144 MS
EKG P-R INTERVAL: 146 MS
EKG P-R INTERVAL: 150 MS
EKG Q-T INTERVAL: 416 MS
EKG Q-T INTERVAL: 428 MS
EKG Q-T INTERVAL: 476 MS
EKG Q-T INTERVAL: 476 MS
EKG QRS DURATION: 136 MS
EKG QRS DURATION: 146 MS
EKG QRS DURATION: 150 MS
EKG QRS DURATION: 152 MS
EKG QTC CALCULATION (BAZETT): 449 MS
EKG QTC CALCULATION (BAZETT): 455 MS
EKG QTC CALCULATION (BAZETT): 476 MS
EKG QTC CALCULATION (BAZETT): 502 MS
EKG R AXIS: -38 DEGREES
EKG R AXIS: -38 DEGREES
EKG R AXIS: -45 DEGREES
EKG R AXIS: -57 DEGREES
EKG T AXIS: 117 DEGREES
EKG T AXIS: 165 DEGREES
EKG T AXIS: 165 DEGREES
EKG T AXIS: 183 DEGREES
EKG VENTRICULAR RATE: 55 BPM
EKG VENTRICULAR RATE: 60 BPM
EKG VENTRICULAR RATE: 70 BPM
EKG VENTRICULAR RATE: 83 BPM
GFR AFRICAN AMERICAN: >60
GFR NON-AFRICAN AMERICAN: >60
GLOBULIN: 2.4 G/DL (ref 2.3–3.5)
GLUCOSE BLD-MCNC: 100 MG/DL (ref 60–115)
GLUCOSE BLD-MCNC: 101 MG/DL (ref 60–115)
GLUCOSE BLD-MCNC: 119 MG/DL (ref 60–115)
GLUCOSE BLD-MCNC: 187 MG/DL (ref 60–115)
GLUCOSE BLD-MCNC: 98 MG/DL (ref 74–109)
GLUCOSE URINE: NEGATIVE MG/DL
HCT VFR BLD CALC: 40.3 % (ref 42–52)
HEMOGLOBIN: 13.1 G/DL (ref 14–18)
KETONES, URINE: NEGATIVE MG/DL
LEUKOCYTE ESTERASE, URINE: NEGATIVE
MAGNESIUM: 2.3 MG/DL (ref 1.7–2.3)
MCH RBC QN AUTO: 29.3 PG (ref 27–31.3)
MCHC RBC AUTO-ENTMCNC: 32.6 % (ref 33–37)
MCV RBC AUTO: 90 FL (ref 80–100)
NITRITE, URINE: NEGATIVE
PDW BLD-RTO: 14.8 % (ref 11.5–14.5)
PERFORMED ON: ABNORMAL
PERFORMED ON: ABNORMAL
PERFORMED ON: NORMAL
PERFORMED ON: NORMAL
PH UA: 6.5 (ref 5–9)
PLATELET # BLD: 161 K/UL (ref 130–400)
POTASSIUM SERPL-SCNC: 3.9 MEQ/L (ref 3.5–5.1)
PROTEIN UA: NEGATIVE MG/DL
RBC # BLD: 4.48 M/UL (ref 4.7–6.1)
RBC UA: ABNORMAL /HPF (ref 0–2)
SODIUM BLD-SCNC: 141 MEQ/L (ref 132–144)
SPECIFIC GRAVITY UA: 1.01 (ref 1–1.03)
TOTAL PROTEIN: 5.6 G/DL (ref 6.4–8.1)
UROBILINOGEN, URINE: 0.2 E.U./DL
WBC # BLD: 7.7 K/UL (ref 4.8–10.8)
WBC UA: ABNORMAL /HPF (ref 0–5)

## 2018-01-10 PROCEDURE — 93005 ELECTROCARDIOGRAM TRACING: CPT

## 2018-01-10 PROCEDURE — 6370000000 HC RX 637 (ALT 250 FOR IP): Performed by: INTERNAL MEDICINE

## 2018-01-10 PROCEDURE — 99221 1ST HOSP IP/OBS SF/LOW 40: CPT | Performed by: PHYSICAL MEDICINE & REHABILITATION

## 2018-01-10 PROCEDURE — 81001 URINALYSIS AUTO W/SCOPE: CPT

## 2018-01-10 PROCEDURE — 92526 ORAL FUNCTION THERAPY: CPT

## 2018-01-10 PROCEDURE — 87086 URINE CULTURE/COLONY COUNT: CPT

## 2018-01-10 PROCEDURE — 6370000000 HC RX 637 (ALT 250 FOR IP): Performed by: PSYCHIATRY & NEUROLOGY

## 2018-01-10 PROCEDURE — 2580000003 HC RX 258: Performed by: INTERNAL MEDICINE

## 2018-01-10 PROCEDURE — 85027 COMPLETE CBC AUTOMATED: CPT

## 2018-01-10 PROCEDURE — G9162 LANG EXPRESS CURRENT STATUS: HCPCS

## 2018-01-10 PROCEDURE — G9163 LANG EXPRESS GOAL STATUS: HCPCS

## 2018-01-10 PROCEDURE — 2500000003 HC RX 250 WO HCPCS: Performed by: INTERNAL MEDICINE

## 2018-01-10 PROCEDURE — 83735 ASSAY OF MAGNESIUM: CPT

## 2018-01-10 PROCEDURE — 92523 SPEECH SOUND LANG COMPREHEN: CPT

## 2018-01-10 PROCEDURE — 6360000002 HC RX W HCPCS: Performed by: INTERNAL MEDICINE

## 2018-01-10 PROCEDURE — 80053 COMPREHEN METABOLIC PANEL: CPT

## 2018-01-10 PROCEDURE — 1180000000 HC REHAB R&B

## 2018-01-10 PROCEDURE — 97116 GAIT TRAINING THERAPY: CPT

## 2018-01-10 PROCEDURE — 97535 SELF CARE MNGMENT TRAINING: CPT

## 2018-01-10 PROCEDURE — 36415 COLL VENOUS BLD VENIPUNCTURE: CPT

## 2018-01-10 RX ORDER — LISINOPRIL 20 MG/1
40 TABLET ORAL DAILY
Status: DISCONTINUED | OUTPATIENT
Start: 2018-01-11 | End: 2018-01-10 | Stop reason: HOSPADM

## 2018-01-10 RX ORDER — SODIUM CHLORIDE 0.9 % (FLUSH) 0.9 %
10 SYRINGE (ML) INJECTION PRN
Status: CANCELLED | OUTPATIENT
Start: 2018-01-10

## 2018-01-10 RX ORDER — ONDANSETRON 2 MG/ML
4 INJECTION INTRAMUSCULAR; INTRAVENOUS EVERY 6 HOURS PRN
Status: CANCELLED | OUTPATIENT
Start: 2018-01-10

## 2018-01-10 RX ORDER — ATORVASTATIN CALCIUM 40 MG/1
40 TABLET, FILM COATED ORAL NIGHTLY
Status: DISCONTINUED | OUTPATIENT
Start: 2018-01-10 | End: 2018-01-13 | Stop reason: HOSPADM

## 2018-01-10 RX ORDER — SODIUM CHLORIDE 0.9 % (FLUSH) 0.9 %
10 SYRINGE (ML) INJECTION EVERY 12 HOURS SCHEDULED
Status: DISCONTINUED | OUTPATIENT
Start: 2018-01-10 | End: 2018-01-10 | Stop reason: SDUPTHER

## 2018-01-10 RX ORDER — SODIUM CHLORIDE 0.9 % (FLUSH) 0.9 %
10 SYRINGE (ML) INJECTION EVERY 12 HOURS SCHEDULED
Status: DISCONTINUED | OUTPATIENT
Start: 2018-01-10 | End: 2018-01-12

## 2018-01-10 RX ORDER — LABETALOL HYDROCHLORIDE 5 MG/ML
10 INJECTION, SOLUTION INTRAVENOUS EVERY 4 HOURS PRN
Status: DISCONTINUED | OUTPATIENT
Start: 2018-01-10 | End: 2018-01-12

## 2018-01-10 RX ORDER — SODIUM CHLORIDE 0.9 % (FLUSH) 0.9 %
10 SYRINGE (ML) INJECTION PRN
Status: DISCONTINUED | OUTPATIENT
Start: 2018-01-10 | End: 2018-01-10 | Stop reason: SDUPTHER

## 2018-01-10 RX ORDER — DEXTROSE MONOHYDRATE 25 G/50ML
12.5 INJECTION, SOLUTION INTRAVENOUS PRN
Status: CANCELLED | OUTPATIENT
Start: 2018-01-10

## 2018-01-10 RX ORDER — NICOTINE POLACRILEX 4 MG
15 LOZENGE BUCCAL PRN
Status: DISCONTINUED | OUTPATIENT
Start: 2018-01-10 | End: 2018-01-12

## 2018-01-10 RX ORDER — METOPROLOL SUCCINATE 50 MG/1
50 TABLET, EXTENDED RELEASE ORAL 2 TIMES DAILY
Status: CANCELLED | OUTPATIENT
Start: 2018-01-10

## 2018-01-10 RX ORDER — HYDRALAZINE HYDROCHLORIDE 20 MG/ML
20 INJECTION INTRAMUSCULAR; INTRAVENOUS EVERY 6 HOURS
Status: CANCELLED | OUTPATIENT
Start: 2018-01-11

## 2018-01-10 RX ORDER — DEXTROSE MONOHYDRATE 25 G/50ML
12.5 INJECTION, SOLUTION INTRAVENOUS PRN
Status: DISCONTINUED | OUTPATIENT
Start: 2018-01-10 | End: 2018-01-13 | Stop reason: HOSPADM

## 2018-01-10 RX ORDER — ACETAMINOPHEN 325 MG/1
650 TABLET ORAL EVERY 4 HOURS PRN
Status: CANCELLED | OUTPATIENT
Start: 2018-01-10

## 2018-01-10 RX ORDER — AMLODIPINE BESYLATE 5 MG/1
5 TABLET ORAL 2 TIMES DAILY
Status: DISCONTINUED | OUTPATIENT
Start: 2018-01-11 | End: 2018-01-13 | Stop reason: HOSPADM

## 2018-01-10 RX ORDER — NICOTINE POLACRILEX 4 MG
15 LOZENGE BUCCAL PRN
Status: CANCELLED | OUTPATIENT
Start: 2018-01-10

## 2018-01-10 RX ORDER — SODIUM CHLORIDE 0.9 % (FLUSH) 0.9 %
10 SYRINGE (ML) INJECTION PRN
Status: DISCONTINUED | OUTPATIENT
Start: 2018-01-10 | End: 2018-01-12

## 2018-01-10 RX ORDER — AMLODIPINE BESYLATE 5 MG/1
5 TABLET ORAL 2 TIMES DAILY
Status: DISCONTINUED | OUTPATIENT
Start: 2018-01-10 | End: 2018-01-10 | Stop reason: HOSPADM

## 2018-01-10 RX ORDER — LABETALOL HYDROCHLORIDE 5 MG/ML
10 INJECTION, SOLUTION INTRAVENOUS EVERY 4 HOURS PRN
Status: CANCELLED | OUTPATIENT
Start: 2018-01-10

## 2018-01-10 RX ORDER — HYDRALAZINE HYDROCHLORIDE 20 MG/ML
20 INJECTION INTRAMUSCULAR; INTRAVENOUS EVERY 6 HOURS
Status: DISCONTINUED | OUTPATIENT
Start: 2018-01-11 | End: 2018-01-12

## 2018-01-10 RX ORDER — AMLODIPINE BESYLATE 5 MG/1
5 TABLET ORAL 2 TIMES DAILY
Status: CANCELLED | OUTPATIENT
Start: 2018-01-10

## 2018-01-10 RX ORDER — ACETAMINOPHEN 325 MG/1
650 TABLET ORAL EVERY 4 HOURS PRN
Status: DISCONTINUED | OUTPATIENT
Start: 2018-01-10 | End: 2018-01-13 | Stop reason: HOSPADM

## 2018-01-10 RX ORDER — SODIUM CHLORIDE 0.9 % (FLUSH) 0.9 %
10 SYRINGE (ML) INJECTION EVERY 12 HOURS SCHEDULED
Status: CANCELLED | OUTPATIENT
Start: 2018-01-10

## 2018-01-10 RX ORDER — ASPIRIN 81 MG/1
81 TABLET ORAL DAILY
Status: DISCONTINUED | OUTPATIENT
Start: 2018-01-11 | End: 2018-01-13 | Stop reason: HOSPADM

## 2018-01-10 RX ORDER — ASPIRIN 81 MG/1
81 TABLET ORAL DAILY
Status: CANCELLED | OUTPATIENT
Start: 2018-01-11

## 2018-01-10 RX ORDER — LISINOPRIL 20 MG/1
40 TABLET ORAL DAILY
Status: CANCELLED | OUTPATIENT
Start: 2018-01-11

## 2018-01-10 RX ORDER — ONDANSETRON 2 MG/ML
4 INJECTION INTRAMUSCULAR; INTRAVENOUS EVERY 6 HOURS PRN
Status: DISCONTINUED | OUTPATIENT
Start: 2018-01-10 | End: 2018-01-12

## 2018-01-10 RX ORDER — DEXTROSE MONOHYDRATE 50 MG/ML
100 INJECTION, SOLUTION INTRAVENOUS PRN
Status: CANCELLED | OUTPATIENT
Start: 2018-01-10

## 2018-01-10 RX ORDER — METOPROLOL SUCCINATE 50 MG/1
50 TABLET, EXTENDED RELEASE ORAL 2 TIMES DAILY
Status: DISCONTINUED | OUTPATIENT
Start: 2018-01-10 | End: 2018-01-12

## 2018-01-10 RX ORDER — DEXTROSE MONOHYDRATE 50 MG/ML
100 INJECTION, SOLUTION INTRAVENOUS PRN
Status: DISCONTINUED | OUTPATIENT
Start: 2018-01-10 | End: 2018-01-13 | Stop reason: HOSPADM

## 2018-01-10 RX ORDER — LISINOPRIL 20 MG/1
40 TABLET ORAL DAILY
Status: DISCONTINUED | OUTPATIENT
Start: 2018-01-11 | End: 2018-01-13 | Stop reason: HOSPADM

## 2018-01-10 RX ORDER — ATORVASTATIN CALCIUM 40 MG/1
40 TABLET, FILM COATED ORAL NIGHTLY
Status: CANCELLED | OUTPATIENT
Start: 2018-01-10

## 2018-01-10 RX ADMIN — AMLODIPINE BESYLATE 5 MG: 5 TABLET ORAL at 10:27

## 2018-01-10 RX ADMIN — METOPROLOL TARTRATE 5 MG: 5 INJECTION, SOLUTION INTRAVENOUS at 17:21

## 2018-01-10 RX ADMIN — HYDRALAZINE HYDROCHLORIDE 20 MG: 20 INJECTION INTRAMUSCULAR; INTRAVENOUS at 14:47

## 2018-01-10 RX ADMIN — ASPIRIN 81 MG: 81 TABLET, COATED ORAL at 10:27

## 2018-01-10 RX ADMIN — SODIUM CHLORIDE, PRESERVATIVE FREE 10 ML: 5 INJECTION INTRAVENOUS at 10:27

## 2018-01-10 RX ADMIN — METOPROLOL TARTRATE 5 MG: 5 INJECTION, SOLUTION INTRAVENOUS at 06:21

## 2018-01-10 RX ADMIN — Medication 10 ML: at 22:12

## 2018-01-10 RX ADMIN — METOPROLOL SUCCINATE 50 MG: 50 TABLET, EXTENDED RELEASE ORAL at 22:08

## 2018-01-10 RX ADMIN — METOPROLOL SUCCINATE 50 MG: 50 TABLET, EXTENDED RELEASE ORAL at 10:26

## 2018-01-10 RX ADMIN — LISINOPRIL 20 MG: 20 TABLET ORAL at 10:27

## 2018-01-10 RX ADMIN — HYDRALAZINE HYDROCHLORIDE 20 MG: 20 INJECTION INTRAMUSCULAR; INTRAVENOUS at 01:57

## 2018-01-10 RX ADMIN — ATORVASTATIN CALCIUM 40 MG: 40 TABLET, FILM COATED ORAL at 22:09

## 2018-01-10 RX ADMIN — METOPROLOL TARTRATE 5 MG: 5 INJECTION, SOLUTION INTRAVENOUS at 12:50

## 2018-01-10 ASSESSMENT — ENCOUNTER SYMPTOMS
BACK PAIN: 1
BOWEL INCONTINENCE: 0

## 2018-01-10 ASSESSMENT — PAIN SCALES - GENERAL: PAINLEVEL_OUTOF10: 0

## 2018-01-10 NOTE — PROGRESS NOTES
[facility] MED SURG TREATMENT NOTE     Date: 1/10/2018  Patient Name: Sean Ontiveros        MRN: [id number]  Account: [de-identified]   : 1960  (65 y.o.)  Room: [place] [address]    Chart Review:  Diagnosis:  The encounter diagnosis was Acute ischemic left MCA stroke ([facility] [address] Restrictions:  Fall  Activity Orders: n/a    Subjective: Per SLP, pt upgraded to thin liquids and soft diet  Patient states:  \"I can shave myself. \"  Pain: 0/10  Description: n/a   Location:  n/a    Objective:  ADL:  Feeding: Positive hand to mouth   Grooming: Mod A to shave for safety, pt able to wet face and apply shaving cream supervised  UE Self -Care:  Supervised to don shirt   LE Self-Care:  Close supervision with verbal cues   Toileting:  Supervised to stand for urination   Toilet transfers:  Anticipated supervised based on chair transfer     Treatment consisted of:   [x] ADL Training  [] Strengthening   [] Transfer Training    [] DME Education  [] HEP   [] Manual Therapy   [] Patient Education  [] Other:       Assessment: Pt's family in room during OT treatment. Pt was seated EOB upon therapist arrival. Pt impulsive to stand, supervised without LOB. Close supervision ambulating to obtain clothes and go to bathroom without LOB. Pt able to stand with good- dynamic standing balance during toileting, mild sway. Pt seated in chair and washed UB and LB with set up and supervision good dynamic seated balance. Mod A for safety with shaving. Pt stood to don underpants and pants, and was holding on to wall with mild sway. Therapist recommended pt sit to complete activity to prevent fall. Pt stated he would not fall. Pt ambulated to chair without LOB.      Plan:  [x] Continue OT per POC  [] D/C OT  [] Other:     Goals/Plan:   [x] Improve balance  [x] Improve Strength   [x] Improve [place] with functional transfers   [x]  Improve [place] with ADLs    Time In: 3:45  Time Out[de-identified]  4:15     Electronically signed by: Magdiel Luna, OT  1/10/2018, 4:31 PM

## 2018-01-10 NOTE — CONSULTS
Intravenous, Q6H  hydrALAZINE (APRESOLINE) injection 20 mg, 20 mg, Intravenous, Q6H  nicotine (NICODERM CQ) 7 MG/24HR 1 patch, 1 patch, Transdermal, Daily  sodium chloride flush 0.9 % injection 10 mL, 10 mL, Intravenous, 2 times per day  sodium chloride flush 0.9 % injection 10 mL, 10 mL, Intravenous, PRN  sodium chloride flush 0.9 % injection 10 mL, 10 mL, Intravenous, 2 times per day  sodium chloride flush 0.9 % injection 10 mL, 10 mL, Intravenous, PRN  acetaminophen (TYLENOL) tablet 650 mg, 650 mg, Oral, Q4H PRN  magnesium hydroxide (MILK OF MAGNESIA) 400 MG/5ML suspension 30 mL, 30 mL, Oral, Daily PRN  ondansetron (ZOFRAN) injection 4 mg, 4 mg, Intravenous, Q6H PRN  atorvastatin (LIPITOR) tablet 40 mg, 40 mg, Oral, Nightly  labetalol (NORMODYNE;TRANDATE) injection 10 mg, 10 mg, Intravenous, Q4H PRN      Social History:    Social History     Social History    Marital status: Single     Spouse name: N/A    Number of children: N/A    Years of education: N/A     Occupational History   [employer]       Social History Main Topics    Smoking status: Current Every Day Smoker    Smokeless tobacco: Never Used    Alcohol use No    Drug use: No    Sexual activity: Not on file     Other Topics Concern    Not on file     Social History Narrative    Lives with his significant other works part-time as a  man. Family History:     History reviewed. No pertinent family history. Review of Systems:     Review of Systems   Cardiovascular: Negative for chest pain. Gastrointestinal: Negative for bowel incontinence. Genitourinary: Negative for bladder incontinence. Musculoskeletal: Positive for back pain. Neurological: Positive for focal weakness, light-headedness and loss of balance. Psychiatric/Behavioral: Positive for confusion.              Physical Exam:    BP (!) 173/79   Pulse 63   Temp 97.5 °F (36.4 °C) (Oral)   Resp 18   Ht 5' 7\" (1.702 m)   Wt 210 lb 1.6 oz (95.3 kg)

## 2018-01-10 NOTE — PROGRESS NOTES
atorvastatin  40 mg Oral Nightly     Continuous Infusions:   sodium chloride 100 mL/hr at 01/07/18 1010     PRN Meds:sodium chloride flush, sodium chloride flush, acetaminophen, magnesium hydroxide, ondansetron, labetalol, hydrALAZINE    LABS  CBC:   Recent Labs      01/05/18   1619  01/06/18   0532   WBC  9.9  10.9*   HGB  16.6  15.2   PLT  176  177     BMP:  Recent Labs      01/05/18   1615  01/06/18   0532  01/07/18   0606   NA  140  136  137   K  3.9  3.7  3.8   CL  97*  97*  97*   CO2  28  23  22   BUN  12  13  23*   CREATININE  0.84  0.83  0.93   GLUCOSE  145*  137*  128*     TSH:  No results for input(s): TSH in the last 72 hours. B12:  No results for input(s): Philipp Husky in the last 72 hours. Vit. D: No results for input(s): VITD25 in the last 72 hours. Lipids:   Lab Results   Component Value Date    CHOL 165 01/06/2018     Lab Results   Component Value Date    TRIG 128 01/06/2018     Lab Results   Component Value Date    HDL 29 (L) 01/06/2018     Lab Results   Component Value Date    LDLCALC 110 01/06/2018     No results found for: LABVLDL, VLDL  No results found for: CHOLHDLRATIO    Ammonia:No results for input(s): AMMONIA in the last 72 hours. LFT: No results for input(s): AST, ALT, ALB, BILITOT, ALKPHOS in the last 72 hours. Urine: No results for input(s): Aman Soumya, GLUCOSEU, BLOODU, PHUR, PROTEINU, UROBILINOGEN, LEUKOCYTESUR in the last 72 hours.     Invalid input(s):  Julian Villarreal,  SPECGRAV,  NITRU     Assessment/Plan:  Right CVA small BG, s/p TPA  Much improved  ASA 81mgs  Minimal deficits, still walks reeling to the left,  Not safe for home  No insurance so await for snf  Headache better  Needs some assistance with walking  - troponemia, potentially demand ischemia, consult cardiology.   - start ASA 81 mg   - statin  - goal blood pressure is less than 278 systolic, less than 490 diastolic  - please treat as needed with IV antihypertensives  - started low down ACE inhibitor,

## 2018-01-10 NOTE — CARE COORDINATION
Rehab has accepted pt for admission. He will go to room 251. Pt is not eligible for medicaid per HELP assessment.

## 2018-01-10 NOTE — PROGRESS NOTES
Scheduled Meds:   metoprolol succinate  50 mg Oral BID    amLODIPine  5 mg Oral Daily    insulin lispro  0-12 Units Subcutaneous TID WC    insulin lispro  0-6 Units Subcutaneous Nightly    aspirin  81 mg Oral Daily    lisinopril  20 mg Oral Daily    metoprolol  5 mg Intravenous Q6H    hydrALAZINE  20 mg Intravenous Q6H    nicotine  1 patch Transdermal Daily    sodium chloride flush  10 mL Intravenous 2 times per day    sodium chloride flush  10 mL Intravenous 2 times per day    atorvastatin  40 mg Oral Nightly     Continuous Infusions:   dextrose       PRN Meds:sodium chloride flush, glucose, dextrose, glucagon (rDNA), dextrose, sodium chloride flush, sodium chloride flush, acetaminophen, magnesium hydroxide, ondansetron, labetalol    PHYSICAL EXAM:    CURRENT VITALS: BP (!) 173/96   Pulse 65   Temp 97.7 °F (36.5 °C) (Oral)   Resp 18   Ht 5' 7\" (1.702 m)   Wt 210 lb 1.6 oz (95.3 kg)   SpO2 99%     CONSTITUTIONAL:  awake, alert, cooperative, no apparent distress,   ENT:  Normocephalic, without obvious abnormality, atraumatic, sinuses nontender on palpation, external ears without lesions,  NECK:  Supple, symmetrical, trachea midline, no adenopathy, thyroid symmetric, not enlarged and no tenderness, skin normal, No bruits. LUNGS:  No increased work of breathing, good air exchange, clear to auscultation bilaterally, no crackles, no wheezing  CARDIOVASCULAR:  Normal apical impulse, regular rate and rhythm, normal S1 and S2,  1/6 systolic murmur noted. ABDOMEN:  Obese, normal bowel sounds, soft, non-distended, non-tender, no masses palpated, no hepatosplenomegally  EXTREMETIES: No edema, Pulses Strong Thruout. No ulcers. NEUROLOGIC:  Awake, alert, oriented to name, place and time. Following all commands and moving all extremties.   SKIN:  no bruising or bleeding, normal skin color, texture, turgor and no rashes     Data:        LABS:      Recent Results (from the past 24 hour(s))   POCT Glucose Collection Time: 01/09/18  4:36 PM   Result Value Ref Range    POC Glucose 168 (H) 60 - 115 mg/dl    Performed on ACCU-CHEK    POCT Glucose    Collection Time: 01/09/18  7:55 PM   Result Value Ref Range    POC Glucose 177 (H) 60 - 115 mg/dl    Performed on ACCU-CHEK    CBC    Collection Time: 01/10/18  5:41 AM   Result Value Ref Range    WBC 7.7 4.8 - 10.8 K/uL    RBC 4.48 (L) 4.70 - 6.10 M/uL    Hemoglobin 13.1 (L) 14.0 - 18.0 g/dL    Hematocrit 40.3 (L) 42.0 - 52.0 %    MCV 90.0 80.0 - 100.0 fL    MCH 29.3 27.0 - 31.3 pg    MCHC 32.6 (L) 33.0 - 37.0 %    RDW 14.8 (H) 11.5 - 14.5 %    Platelets 009 701 - 266 K/uL   Comprehensive Metabolic Panel    Collection Time: 01/10/18  5:41 AM   Result Value Ref Range    Sodium 141 132 - 144 mEq/L    Potassium 3.9 3.5 - 5.1 mEq/L    Chloride 105 98 - 107 mEq/L    CO2 24 22 - 29 mEq/L    Anion Gap 12 7 - 13 mEq/L    Glucose 98 74 - 109 mg/dL    BUN 22 (H) 6 - 20 mg/dL    CREATININE 0.81 0.70 - 1.20 mg/dL    GFR Non-African American >60.0 >60    GFR  >60.0 >60    Calcium 8.2 (L) 8.6 - 10.2 mg/dL    Total Protein 5.6 (L) 6.4 - 8.1 g/dL    Alb 3.2 (L) 3.9 - 4.9 g/dL    Total Bilirubin 0.3 0.0 - 1.2 mg/dL    Alkaline Phosphatase 48 35 - 104 U/L    ALT 8 0 - 41 U/L    AST 10 0 - 40 U/L    Globulin 2.4 2.3 - 3.5 g/dL   Magnesium    Collection Time: 01/10/18  5:41 AM   Result Value Ref Range    Magnesium 2.3 1.7 - 2.3 mg/dL   POCT Glucose    Collection Time: 01/10/18  6:21 AM   Result Value Ref Range    POC Glucose 119 (H) 60 - 115 mg/dl    Performed on ACCU-CHEK    EKG 12 Lead    Collection Time: 01/10/18  6:27 AM   Result Value Ref Range    Ventricular Rate 55 BPM    Atrial Rate 55 BPM    P-R Interval 144 ms    QRS Duration 150 ms    Q-T Interval 476 ms    QTc Calculation (Bazett) 455 ms    P Axis 45 degrees    R Axis -38 degrees    T Axis 183 degrees             CXR:     Pending     EKG:   Normal sinus rhythm   Left axis deviation   Right bundle branch

## 2018-01-10 NOTE — PROGRESS NOTES
at 3:51 PM      Speech Current  CJ   Speech Goal  CI   Speech Discharge       Severity Levels  CH - 0% Impaired or limited  CI - At least 1%, but less than 20%  CJ - At least 20%, but less than 40%  CK - At least 40%, but less than 60%  CL - At least 60%, but less than 80%  CM - At least 80%, but less than 100%  CN - 100% impaired or limited

## 2018-01-10 NOTE — PROGRESS NOTES
Progress Note  NEUROLOGY  2N  Dr Jaswinder Nieto       Patient: Reuben Us  Unit/Bed: MB38/QA52-23  YOB: 1960  MRN: [id number]  Acct: [de-identified]   Admitting Diagnosis: Stroke-like symptom [R29.90]  Admit Date:  1/5/2018  Hospital Day: 3    Subjective: Improved  Headache better  Speech better  Patient Seen, Chart, Labs, Radiology studies, and Consults reviewed. Seen in ER    Objective:   BP (!) 177/103   Pulse 81   Temp 99.6 °F (37.6 °C) (Oral)   Resp 16   Wt 210 lb 1.6 oz (95.3 kg)   SpO2 98%     Intake/Output Summary (Last 24 hours) at 01/07/18 1509  Last data filed at 01/07/18 0600   Gross per 24 hour   Intake                0 ml   Output              425 ml   Net             -425 ml       Physical Exam:  GENERAL APPEARANCE: No distress, Prefers to keep his eyes closed, interactive and cooperative.      MENTAL STATE:speech normal, . He  follows commands, he is calm. Normal repetation and naming   CRANIAL NERVES: Are normal  CN 2 Visual fields full to confrontation. CN 3, 4, 6 Pupils round, equally reactive to light. No ptosis. EOMs normal alignment No nystagmus. CN 5 Facial sensation intact bilaterally. CN 7 Normal and symmetric facial strength. Nasolabial folds symmetric. CN 8 untestable due to aphasia  CN 9 untestable due to aphasia. CN 11 does not follow command   CN 12 does not follow command     MOTOR:Muscle strength was 5/5 in distal and proximal muscles in both upper and lower extremities.  No fasciculations, tremor or other abnormal movements were present.      SENSORY: Sensory exam was untestable to take aphasia     COORDINATION: Coordination exam was untestable due to aphasia         Medications:    aspirin  81 mg Oral Daily    [START ON 1/8/2018] lisinopril  10 mg Oral Daily    lisinopril  5 mg Oral Daily    nicotine  1 patch Transdermal Daily    sodium chloride flush  10 mL Intravenous 2 times per day    sodium chloride flush  10 mL Intravenous 2 times per day    to titrate.   - 2d cardiac echo with bubble study no clot, EF 50-55%, LVH  - continue telemetry  - PT, OT, Speech therapy  - nicotine patch  - HgbA1C normal.  Rehab eval pending  - thank you for consult, will continue to follow      Anibal Lucas MD  1/9

## 2018-01-10 NOTE — CARE COORDINATION
MET WITH PATIENT TO DISCUSS D/C PLANS. PATIENT IS FROM HOME. PER  NURSING PATIENT IS LEANING TO LEFT WHEN HE WALKS. DR. Marlene Meraz FEELS PATIENT SHOULD HAVE THERAPY BEFORE RETURNING HOME. HELP IS IN THE PROCESS OF STARTING A MEDICAID APPLICATION. PATIENT VERBALIZED CONCERN ABOUT GETTING BACK TO WORK TO PAY HIS BILLS. HE IS A . I DID DISCUSS WITH HIM THAT HE WOULD NOT BE APPROVED BY DR. JOE TO RETURN TO DRIVING FOR SOME TIME. HE IS AGREEABLE TO GO TO [facility] IF ACCEPTED. CARE TEAM TO FOLLOW. CASE DISCUSSED WITH JORDAN Trujillo RN.  Electronically signed by Fernando Méndez RN on 1/10/2018 at 1:03 PM

## 2018-01-11 PROBLEM — E66.01 MORBID OBESITY DUE TO EXCESS CALORIES (HCC): Status: ACTIVE | Noted: 2018-01-11

## 2018-01-11 PROBLEM — R29.90 STROKE-LIKE SYMPTOM: Status: RESOLVED | Noted: 2018-01-05 | Resolved: 2018-01-11

## 2018-01-11 PROBLEM — F43.9 STRESS AT HOME: Status: ACTIVE | Noted: 2018-01-11

## 2018-01-11 LAB
ANION GAP SERPL CALCULATED.3IONS-SCNC: 13 MEQ/L (ref 7–13)
BUN BLDV-MCNC: 17 MG/DL (ref 6–20)
CALCIUM SERPL-MCNC: 8.2 MG/DL (ref 8.6–10.2)
CHLORIDE BLD-SCNC: 100 MEQ/L (ref 98–107)
CO2: 24 MEQ/L (ref 22–29)
CREAT SERPL-MCNC: 0.71 MG/DL (ref 0.7–1.2)
GFR AFRICAN AMERICAN: >60
GFR NON-AFRICAN AMERICAN: >60
GLUCOSE BLD-MCNC: 106 MG/DL (ref 60–115)
GLUCOSE BLD-MCNC: 114 MG/DL (ref 60–115)
GLUCOSE BLD-MCNC: 118 MG/DL (ref 60–115)
GLUCOSE BLD-MCNC: 121 MG/DL (ref 60–115)
GLUCOSE BLD-MCNC: 93 MG/DL (ref 74–109)
PERFORMED ON: ABNORMAL
PERFORMED ON: ABNORMAL
PERFORMED ON: NORMAL
PERFORMED ON: NORMAL
POTASSIUM SERPL-SCNC: 3.8 MEQ/L (ref 3.5–5.1)
SODIUM BLD-SCNC: 137 MEQ/L (ref 132–144)

## 2018-01-11 PROCEDURE — 99222 1ST HOSP IP/OBS MODERATE 55: CPT | Performed by: PHYSICAL MEDICINE & REHABILITATION

## 2018-01-11 PROCEDURE — 1180000000 HC REHAB R&B

## 2018-01-11 PROCEDURE — 97535 SELF CARE MNGMENT TRAINING: CPT

## 2018-01-11 PROCEDURE — 97116 GAIT TRAINING THERAPY: CPT

## 2018-01-11 PROCEDURE — 6370000000 HC RX 637 (ALT 250 FOR IP): Performed by: INTERNAL MEDICINE

## 2018-01-11 PROCEDURE — 6360000002 HC RX W HCPCS: Performed by: INTERNAL MEDICINE

## 2018-01-11 PROCEDURE — 92610 EVALUATE SWALLOWING FUNCTION: CPT

## 2018-01-11 PROCEDURE — 93010 ELECTROCARDIOGRAM REPORT: CPT | Performed by: INTERNAL MEDICINE

## 2018-01-11 PROCEDURE — 2580000003 HC RX 258: Performed by: INTERNAL MEDICINE

## 2018-01-11 PROCEDURE — 97530 THERAPEUTIC ACTIVITIES: CPT

## 2018-01-11 PROCEDURE — 80048 BASIC METABOLIC PNL TOTAL CA: CPT

## 2018-01-11 PROCEDURE — 36415 COLL VENOUS BLD VENIPUNCTURE: CPT

## 2018-01-11 PROCEDURE — 97165 OT EVAL LOW COMPLEX 30 MIN: CPT

## 2018-01-11 PROCEDURE — 97127 HC OT THER IVNTJ W/FOCUS COG FUNCJ: CPT

## 2018-01-11 PROCEDURE — 97161 PT EVAL LOW COMPLEX 20 MIN: CPT

## 2018-01-11 PROCEDURE — 97112 NEUROMUSCULAR REEDUCATION: CPT

## 2018-01-11 RX ORDER — BISACODYL 10 MG
10 SUPPOSITORY, RECTAL RECTAL DAILY PRN
Status: DISCONTINUED | OUTPATIENT
Start: 2018-01-11 | End: 2018-01-12

## 2018-01-11 RX ORDER — ACETAMINOPHEN 325 MG/1
650 TABLET ORAL EVERY 4 HOURS PRN
Status: DISCONTINUED | OUTPATIENT
Start: 2018-01-11 | End: 2018-01-13 | Stop reason: HOSPADM

## 2018-01-11 RX ORDER — SODIUM PHOSPHATE, DIBASIC AND SODIUM PHOSPHATE, MONOBASIC 7; 19 G/133ML; G/133ML
1 ENEMA RECTAL
Status: DISPENSED | OUTPATIENT
Start: 2018-01-11 | End: 2018-01-11

## 2018-01-11 RX ADMIN — ATORVASTATIN CALCIUM 40 MG: 40 TABLET, FILM COATED ORAL at 20:51

## 2018-01-11 RX ADMIN — HYDRALAZINE HYDROCHLORIDE 20 MG: 20 INJECTION INTRAMUSCULAR; INTRAVENOUS at 17:03

## 2018-01-11 RX ADMIN — Medication 10 ML: at 08:58

## 2018-01-11 RX ADMIN — ASPIRIN 81 MG: 81 TABLET, COATED ORAL at 08:54

## 2018-01-11 RX ADMIN — LISINOPRIL 40 MG: 20 TABLET ORAL at 08:54

## 2018-01-11 RX ADMIN — METOPROLOL SUCCINATE 50 MG: 50 TABLET, EXTENDED RELEASE ORAL at 08:54

## 2018-01-11 RX ADMIN — METOPROLOL SUCCINATE 50 MG: 50 TABLET, EXTENDED RELEASE ORAL at 20:51

## 2018-01-11 RX ADMIN — AMLODIPINE BESYLATE 5 MG: 5 TABLET ORAL at 17:03

## 2018-01-11 RX ADMIN — AMLODIPINE BESYLATE 5 MG: 5 TABLET ORAL at 08:55

## 2018-01-11 RX ADMIN — HYDRALAZINE HYDROCHLORIDE 20 MG: 20 INJECTION INTRAMUSCULAR; INTRAVENOUS at 12:30

## 2018-01-11 RX ADMIN — HYDRALAZINE HYDROCHLORIDE 20 MG: 20 INJECTION INTRAMUSCULAR; INTRAVENOUS at 06:01

## 2018-01-11 ASSESSMENT — ENCOUNTER SYMPTOMS
BLOOD IN STOOL: 0
SHORTNESS OF BREATH: 0
ABDOMINAL DISTENTION: 0
VOMITING: 0
WHEEZING: 0
TROUBLE SWALLOWING: 0
ABDOMINAL PAIN: 0
FACIAL SWELLING: 0
CHOKING: 0
EYE PAIN: 0
COUGH: 0
NAUSEA: 0
CHEST TIGHTNESS: 0
EYE REDNESS: 0
CONSTIPATION: 0
PHOTOPHOBIA: 0
COLOR CHANGE: 0
ANAL BLEEDING: 0

## 2018-01-11 ASSESSMENT — PAIN SCALES - GENERAL
PAINLEVEL_OUTOF10: 0

## 2018-01-11 NOTE — PROGRESS NOTES
Re-education, Safety Education & Training, Home Exercise Program, Functional Mobility Training    Patient's Goal:       GOALS:  Short term goals  Short term goal 1: Pt to comlete HEP with indep  Long term goals  Long term goal 1: Pt to complete 12 steps without HR indep  Long term goal 2: Pt to complete SLS X 10sec  Long term goal 3: Pt to ambulate 500ft in halls, negotiating obstacles and busy environment with indep  Long term goal 4: Pt to ambulate 20ft completing head turns demonstrating smooth gait pattern    ELOS:   1-2 days    Therapy Time:    Individual   Time In 1100   Time Out 1130   Minutes [address], [place], 01/11/18 at 12:08 PM

## 2018-01-11 NOTE — PROGRESS NOTES
ronny                  DIET LEVEL PRIOR TO THIS EVALUATION :         CURRENT RESPIRATORY STATUS:   [x]Room Air  []Nasal Canula []O2  Mask  []Non Re-Breather    []Ventilator  []BiPAP  []Tracheostomy with Room Air []Tracheostomy with O2        CURRENT COGNITIVE STATUS:   [x] Alert    [x]Responsive       []Non-responsive   []Confused  [x] Cooperative     []Uncooperative    []Aphasic       []Impulsive              PROCEDURE   Consistencies Administered During the Evaluation   Liquids: [x]  Thin    []  Nectar   []  Honey  Solids:  [x]  Pureed/Pudding   [x]  Soft Solid   [x]  Solid     []Moistened Toothette   []Other:      Method of Intake:   [x]  Cup    [x]  Spoon  [x]  Straw    [x]  Self Fed    []Set-Up     []  Fed by Clinician     Position:   [x]  Upright seated ([]In bed [x] in chair)   []  Reclined upright in bed                     ORAL MECHANISM EXAMINATION  [] Adequate lingual/labial strength  [x] Generalized oral weakness  [] Left labiobuccal weakness   [] Right labiobuccal weakness  [] Left lingual deviation   [] Right lingual deviation  [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    [] CNT  [] DNT    RESULTS   Oral Stage:   []  WNL []  WFL [x]  Exceptions     [x]  Dentition: []  natural  []  missing teeth  [x]  edentulous  []  partials  [] dentures     []  Inadequate labial seal resulting anterior labial spillage from:   [] right  [] left  [] midline     [x]  Decreased mastication due to:     [x]  poor/missing dentition  []  decreased lingual control  []  vertical munch    []  cognitive function    []  Delayed A-P transit due to []  decreased initiation   [] reduced lingual strength         []  cognitive function     []  Oral residuals []  right buccal cavity  []  left buccal cavity []  sub lingually   []  on tongue base   []  throughout oral cavity     []  on superior tongue       []  on palate               []  on velum              []  anterior sulcus    Oral Stage Impression:   Pt p/w mild oral dysphagia characterized by notified    Safety Devices:  [] Call light within reach [] Chair alarm activated  [] Bed alarm activated    Patient Education:  Treatment goals discussed with [x]  Patient  []  family. The [x]  Patient  []  family understand the diagnosis, prognosis and plan of care.                  [] Reinforcement needed    Jarred Cerda 01/11/18 2:59 PM

## 2018-01-11 NOTE — DISCHARGE SUMMARY
or gallops. Abdomen: Soft, non-tender, non-distended with normal bowel sounds. Musculoskeletal: No clubbing, cyanosis or edema bilaterally. Full range of motion without deformity. Skin: Skin color, texture, turgor normal.  No rashes or lesions. Neuro: Non Focal. Symetrical motor and tone. Nl Comprehension, Alert,awake and oriented. NL CN. Symetrical tone and reflexes. Psychiatric: Alert and oriented, thought content appropriate, normal insight  Capillary Refill: Brisk,< 3 seconds   Peripheral Pulses: +2 palpable, equal bilaterally     Patient was seen by the following consultants while admitted to [facility]:   Consults:  Ezequiel  IP CONSULT TO CARDIOLOGY  IP CONSULT TO REHAB/TCU ADMISSION COORDINATOR    Significant Diagnostic Studies:    No results found. Discharge Medications:       Rylan Kingsley 6558 Medication Instructions [facility]:[id number]    Printed on:01/11/18 0198   Medication Information                      hydrochlorothiazide (HYDRODIURIL) 25 MG tablet  Take 25 mg by mouth daily             lisinopril (PRINIVIL;ZESTRIL) 40 MG tablet  Take 40 mg by mouth daily             metFORMIN (GLUCOPHAGE) 500 MG tablet  Take 500 mg by mouth daily (with breakfast)                 Disposition:   Acute rehab    Condition at discharge: Pt was medically stable at the time of discharge. Activity: activity as tolerated    Total time taken for discharging this patient: 40 minutes. Greater than 70% of time was spent focused exclusively on this patient. Time was taken to review chart, discuss plans with consultants, reconciling medications, discussing plan answering questions with patient.      Gomez Hein  1/11/2018, 1:19 PM  ----------------------------------------------------------------------------------------------------------------------    Benji Castillo

## 2018-01-11 NOTE — H&P
HISTORY & PHYSICAL       DATE OF ADMISSION:  1/10/2018    DATE OF SERVICE:  1/11/18    Subjective:    Camacho Harris, 65 y.o. male presents today with:     CHIEF COMPLAINT:       This is a 65year old male who presented to the ER with change in mental status, confusion and a degree of aphasia. According to EMS this was on acute onset approximately one hour prior to admission. The patient was a candidate for TPA according to the records. Workup included a CXR on 01/05/18 showed bilateral infiltrates, bilateral atelectasis. CT of the brain on 01/05/18 showed persistent area of low attenuation left parietal lobe compatible with area of infarct, no hemorrhage transforrnation, question left MCA clot sign. Abby Mediate MRI of the brain on 01/06/18 showed small restricted diffusion focus in the right basal ganglia raises the possibility of small acute or subacute small lacnuar infarct within right basal ganglia. MRA of the brain on 01/06/18 was negative. CT of the brain on 01/06/18 showed previous area in left parietal lobe unchanged. Stress test Lexiscan on 01/09/18 was negative. The patient has been found to have severe abnormality of gait and mobility with left hemiparesis impaired self care due to right basal ganglia infarct and is admitted to the acute inpatient rehab program.           Neurologic Problem   The patient's primary symptoms include an altered mental status, clumsiness, focal sensory loss, focal weakness and weakness. The patient's pertinent negatives include no syncope. This is a new problem. The current episode started in the past 7 days. The neurological problem developed suddenly. The problem has been gradually improving since onset. There was left-sided, lower extremity and upper extremity focality noted. Associated symptoms include confusion, dizziness, fatigue and light-headedness.  Pertinent negatives include no abdominal pain, chest pain, fever, headaches, nausea, neck pain, palpitations, varying-in-size punctate and patchy white matter T2 hyperintense foci and the differential considerations include a sequela of small vessel disease or microangiopathy, vasculitis and demyelinating disease is not totally excluded. There is an equivocal small restricted diffusion focus in the right basal ganglia which raises the possibility of an acute or subacute \"lacunar\" infarction within the right basal ganglia. However, there is a \"flow-void\" signal within both MCA vessels therefore, there is no MRI evidence of an MCA arterial occlusion. There is no abnormal paramagnetic signal in the brain. There is no intracranial mass, hemorrhage, \"mass effect\" or midline shift. 1. Equivocal small restricted diffusion focus in the right basal ganglia raises the possibility of an acute or subacute small \"lacunar\" infarction within the right basal ganglia and clinical correlation warranted. 2. No restricted diffusion in the left cerebral hemisphere or elsewhere in the brain. 3. Bilateral nonspecific punctate and patchy white matter T2 hyperintense foci and differential considerations include a sequela of small vessel disease or vasculopathy, vasculitis and demyelinating disease. 4. No intracranial mass, hemorrhage, \"mass effect\" or midline shift. Us Carotid Artery Bilateral    Result Date: 1/7/2018  BILATERAL DUPLEX CAROTID ULTRASOUND CLINICAL INFORMATION:  RECENT EPISODE OF RIGHT FACIAL DROOP, SLURRED SPEECH AND CONFUSION, DIABETIC WITH HYPERLIPIDEMIA AND HYPERTENSION, EVALUATE FOR CAROTID ARTERY STENOSIS IN THE NECK.  COMPARISON:  NONE AVAILABLE FINDINGS:  The bilateral carotid duplex ultrasound study demonstrates the following:                                                                 RIGHT            LEFT Carotid plaque burden                           Mild               Mild Proximal common carotid artery           104 cm/s            144 cm/s  Mid common carotid artery                   106 cm/s            140 memory are normal. Cognition and memory are not impaired. He does not express impulsivity or inappropriate judgment. He does not exhibit a depressed mood. He expresses no homicidal and no suicidal ideation. He expresses no suicidal plans and no homicidal plans. He is communicative. He exhibits normal recent memory and normal remote memory. He is attentive. Vitals reviewed. Back Exam     Muscle Strength   Right Quadriceps:  4/5   Left Quadriceps:  4/5   Right Hamstrings:  4/5   Left Hamstrings:  4/5           Neurologic Exam     Mental Status   Oriented to person, place, and time. Attention: decreased. Concentration: decreased. Speech: slurred   Level of consciousness: alert  Knowledge: inconsistent with education. Able to name object. Able to read. Abnormal comprehension. Cranial Nerves     CN III, IV, VI   Pupils are equal, round, and reactive to light.   Extraocular motions are normal.     Motor Exam   Muscle bulk: increased  Overall muscle tone: normal    Strength   Right neck flexion: 4/5  Left neck flexion: 4/5  Right neck extension: 4/5  Left neck extension: 4/5  Right deltoid: 4/5  Left deltoid: 4/5  Right biceps: 4/5  Left biceps: 4/5  Right triceps: 4/5  Left triceps: 4/5  Right wrist flexion: 4/5  Left wrist flexion: 4/5  Right wrist extension: 4/5  Left wrist extension: 4/5  Right interossei: 4/5  Left interossei: 4/5  Right abdominals: 4/5  Left abdominals: 4/5  Right iliopsoas: 4/5  Left iliopsoas: 4/5  Right quadriceps: 4/5  Left quadriceps: 4/5  Right hamstrin/5  Left hamstrin/5  Right glutei: 4/5  Left glutei: 4/5  Right anterior tibial: 4/5  Left anterior tibial: 4/5  Right posterior tibial: 4/5  Left posterior tibial: 4/5  Right peroneal: 4/5  Left peroneal: 4/5  Right gastroc: 4/5  Left gastroc: 4/5    Sensory Exam   Right arm light touch: decreased from wrist  Left arm light touch: decreased from wrist  Right leg light touch: decreased from knee  Left leg light touch: decreased from knee    Gait, Coordination, and Reflexes     Gait  Gait: wide-based    Coordination   Romberg: positive  Finger to nose coordination: abnormal  Heel to shin coordination: abnormal  Tandem walking coordination: abnormal    Reflexes   Right brachioradialis: 1+  Left brachioradialis: 1+  Right biceps: 1+  Left biceps: 1+  Right triceps: 1+  Left triceps: 0  Right patellar: 1+  Left patellar: 1+  Right achilles: 0  Left achilles: 0  Right : 0  Left : 0      After extensive review of the records and above physical exam, I have formulated the following diagnoses and plan:      DIAGNOSES:      1. The patient was admitted to the acute rehabilitation unit with the primary rehab diagnoses being severe abnormality of gait and mobility with left hemiparesis and impaired self care and ADL's due to right basal ganglia infarct. Compared to Pre-Admission Assessment, patients medical and functional status remain challengingly complex and patient continues to require intensive therapeutic intervention from multiple therapies, therefore, initiate acute intensive comprehensive inpatient rehabilitation program including PT/OT to improve balance, ambulation, ADLs, and to improve the P/AROM. Functional and medical status reassessed regarding patients ability to participate in therapies and patient found to be able to participate in acute intensive comprehensive inpatient rehabilitation program.  Therapeutic modifications regarding activities in therapies, place, amount of time per day and intensity of therapy made daily. Enroll in acute course of therapy program to include 1 1/2 hours per day of PT 5 to 7 days per week and 1 1/2 hours per day of OT 5 to 7 days per week, and  ST 1/2 hour per day 3-5 days per week .   The patient is stable medically and physically on previous exam.       This patient present with significant new onset decreased mobility and inability to perform activities of daily living skills independently and is at significant risk for prolonged disability  For this reason they have been admitted to [facility]. The patient's current functional and medical status are highly complex but the patient is able to participate in intensive rehabilitation. A comprehensive inpatient rehabilitation program is appropriate. The patient will undergo initial evaluation by the rehabilitation team and be discussed at regular treatment team meetings to assess progress, mobility, self care, mood and discharge issues. Physical therapy will be consulted for mobility and endurance issues and should be performed 1 to 2 times per day, 7 days per week for the length of stay. Occupational therapy will be consulted for activities of daily living and should be performed 1 to 2 times per day, 7 days per week for the length of stay. Their capacity to participate at an acute level, decision to be treated in the gym, room or on the unit, their activity goals for the day and the number of minutes of active participation will be reassessed and re-prescribed daily. Because this patient is medically complex, I will check a CBC, BMP, UA and orthostatic blood pressures. They will be reassessed daily regarding their ability to participate in an acute level rehabilitation program.  Recreational Therapy will be consulted for community re-entry and adjustment to disability. Communication, cognitive and emotional issues will also be addressed during this rehabilitation stay by rehabilitation psychologist or speech therapist as appropriate. I reviewed the patient's old and current charts and discussed other rehabilitation options with the rehabilitation team including the rehab RN and the admission team as well as the patient.   I feel that the patient's functional recovery would be best served at an acute inpatient rehabilitation program because the patient needs intense therapy three

## 2018-01-11 NOTE — PROGRESS NOTES
edentulous)  Grooming: Supervision  UE Bathing: Supervision  LE Bathing: Supervision  UE Dressing: Supervision  LE Dressing: Supervision  Toileting: Supervision  Additional Comments: Patient stood to don underwear and pants with no physical assist provided   Toilet Transfers  Toilet Transfer: Supervision  Tub Transfers  Tub Transfers: Not tested  Shower Transfers  Shower Transfers: Supervision    Tone RUE  RUE Tone: Normotonic  Tone LUE  LUE Tone: Normotonic  Coordination  Movements Are Fluid And Coordinated: No           Vision - Basic Assessment  Prior Vision: No visual deficits  Cognition  Overall Cognitive Status: Exceptions  Arousal/Alertness: Appropriate responses to stimuli  Following Commands: Follows one step commands with repetition  Attention Span: Appears intact  Memory: Appears intact  Safety Judgement: Decreased awareness of need for safety  Problem Solving:  (slower processing)  Insights: Decreased awareness of deficits  Initiation: Does not require cues  Sequencing: Requires cues for some  Cognition Comment: Comp - 5, Exp -6, soc int - 5, prob solv -5 mem - 5  Perception  Overall Perceptual Status: WFL (Patient was able to copy clock design with no errors)     Sensation  Overall Sensation Status: WFL        LUE AROM (degrees)  LUE AROM : WFL  Left Hand AROM (degrees)  Left Hand AROM: WFL  RUE AROM (degrees)  RUE AROM : WFL  Right Hand AROM (degrees)  Right Hand AROM: WFL  LUE Strength  Gross LUE Strength: WFL  LUE Strength Comment: overall 4/5 strength   RUE Strength  Gross RUE Strength: WFL  RUE Strength Comment: overall 4/5 strength   L Fingers  Left Finger Strength Comment: WFL  R Fingers  Right Finger Strength Comment: WFL    Hand Dominance: Right            Assessment   Performance deficits / Impairments: Decreased endurance;Decreased ADL status; Decreased cognition;Decreased high-level IADLs;Decreased safe awareness  Prognosis: Good  Decision Making: Low Complexity  History: Brief Medical History  Exam: 5 areas of deficit  Assistance / Modification: no modification needed  Discharge Recommendations: Continue to assess pending progress  REQUIRES OT FOLLOW UP: Yes  Activity Tolerance  Activity Tolerance: Patient Tolerated treatment well  Safety Devices  Safety Devices in place: Yes  Type of devices: All fall risk precautions in place        Discharge Recommendations:  Continue to assess pending progress     Plan   Plan  Times per week: 5-7 x/ week for   Plan weeks: 2-4 days  Current Treatment Recommendations: Self-Care / ADL, Cognitive/Perceptual Training, Equipment Evaluation, Education, & procurement, Patient/Caregiver Education & Training, Safety Education & Training, Strengthening      Goals  Patient Goals   Patient goals : Get blood pressure better     [x]  Patient will complete self care as followed using the recommended adaptive equipment and/or adaptive techniques as instructed:  Feeding: modified indep  Grooming: Independent  Bathing:Independent  UE Dressing:Independent  LE Dressing: Independent  Toileting: Independent  Toilet Transfers: Independent  Tub Transfers: Independent   [x]  Patient will sequence self-care routine with no verbal/tactile cues. [x]  Patient will improve static and dynamic standing balance to complete pants management at indep level         [x]  Patient will improve functional endurance to tolerate/complete 60 minutes of ADLs without SOB     [x]  Patient will improve B UE strength and endurance to 4+/5 in order to participate in self-care activities as projected. [x]  Patient will perform kitchen mobility at device level without episodes of LOB and good safety awareness      [x]  Patient will perform basic room mobility at indpendent level. [x]  Patient will access appropriate D/C site with as few architectural barriers as possible.                    [x]  Patient's cognition will improve to safely perform ADLs:  Comprehension: modified

## 2018-01-12 LAB
ANION GAP SERPL CALCULATED.3IONS-SCNC: 10 MEQ/L (ref 7–13)
BUN BLDV-MCNC: 13 MG/DL (ref 6–20)
CALCIUM SERPL-MCNC: 8.3 MG/DL (ref 8.6–10.2)
CHLORIDE BLD-SCNC: 102 MEQ/L (ref 98–107)
CO2: 26 MEQ/L (ref 22–29)
CREAT SERPL-MCNC: 0.72 MG/DL (ref 0.7–1.2)
GFR AFRICAN AMERICAN: >60
GFR NON-AFRICAN AMERICAN: >60
GLUCOSE BLD-MCNC: 103 MG/DL (ref 60–115)
GLUCOSE BLD-MCNC: 91 MG/DL (ref 74–109)
GLUCOSE BLD-MCNC: 95 MG/DL (ref 60–115)
GLUCOSE BLD-MCNC: 98 MG/DL (ref 60–115)
HCT VFR BLD CALC: 39.6 % (ref 42–52)
HEMOGLOBIN: 13.4 G/DL (ref 14–18)
MCH RBC QN AUTO: 29.8 PG (ref 27–31.3)
MCHC RBC AUTO-ENTMCNC: 33.7 % (ref 33–37)
MCV RBC AUTO: 88.3 FL (ref 80–100)
PDW BLD-RTO: 14.2 % (ref 11.5–14.5)
PERFORMED ON: NORMAL
PLATELET # BLD: 176 K/UL (ref 130–400)
POTASSIUM SERPL-SCNC: 4.2 MEQ/L (ref 3.5–5.1)
RBC # BLD: 4.49 M/UL (ref 4.7–6.1)
SODIUM BLD-SCNC: 138 MEQ/L (ref 132–144)
URINE CULTURE, ROUTINE: NORMAL
WBC # BLD: 7.5 K/UL (ref 4.8–10.8)

## 2018-01-12 PROCEDURE — 97535 SELF CARE MNGMENT TRAINING: CPT

## 2018-01-12 PROCEDURE — 85027 COMPLETE CBC AUTOMATED: CPT

## 2018-01-12 PROCEDURE — 99232 SBSQ HOSP IP/OBS MODERATE 35: CPT | Performed by: PHYSICAL MEDICINE & REHABILITATION

## 2018-01-12 PROCEDURE — 97530 THERAPEUTIC ACTIVITIES: CPT

## 2018-01-12 PROCEDURE — G0108 DIAB MANAGE TRN  PER INDIV: HCPCS

## 2018-01-12 PROCEDURE — 96125 COGNITIVE TEST BY HC PRO: CPT

## 2018-01-12 PROCEDURE — 6370000000 HC RX 637 (ALT 250 FOR IP): Performed by: INTERNAL MEDICINE

## 2018-01-12 PROCEDURE — 36415 COLL VENOUS BLD VENIPUNCTURE: CPT

## 2018-01-12 PROCEDURE — 97127 HC OT THER IVNTJ W/FOCUS COG FUNCJ: CPT

## 2018-01-12 PROCEDURE — 92523 SPEECH SOUND LANG COMPREHEN: CPT

## 2018-01-12 PROCEDURE — 1180000000 HC REHAB R&B

## 2018-01-12 PROCEDURE — 97116 GAIT TRAINING THERAPY: CPT

## 2018-01-12 PROCEDURE — 97112 NEUROMUSCULAR REEDUCATION: CPT

## 2018-01-12 PROCEDURE — 6360000002 HC RX W HCPCS: Performed by: INTERNAL MEDICINE

## 2018-01-12 PROCEDURE — 6370000000 HC RX 637 (ALT 250 FOR IP): Performed by: PHYSICAL MEDICINE & REHABILITATION

## 2018-01-12 PROCEDURE — 80048 BASIC METABOLIC PNL TOTAL CA: CPT

## 2018-01-12 RX ORDER — ASPIRIN 81 MG/1
81 TABLET ORAL DAILY
Qty: 30 TABLET | Refills: 3 | Status: SHIPPED | OUTPATIENT
Start: 2018-01-13 | End: 2018-01-26 | Stop reason: SDUPTHER

## 2018-01-12 RX ORDER — AMLODIPINE BESYLATE 5 MG/1
5 TABLET ORAL 2 TIMES DAILY
Qty: 30 TABLET | Refills: 3 | Status: SHIPPED | OUTPATIENT
Start: 2018-01-12 | End: 2018-01-13

## 2018-01-12 RX ORDER — ATORVASTATIN CALCIUM 40 MG/1
40 TABLET, FILM COATED ORAL NIGHTLY
Qty: 30 TABLET | Refills: 3 | Status: SHIPPED | OUTPATIENT
Start: 2018-01-12 | End: 2018-01-13

## 2018-01-12 RX ORDER — METOPROLOL TARTRATE 50 MG/1
50 TABLET, FILM COATED ORAL 2 TIMES DAILY
Qty: 60 TABLET | Refills: 3 | Status: SHIPPED | OUTPATIENT
Start: 2018-01-12 | End: 2018-01-13

## 2018-01-12 RX ORDER — HYDRALAZINE HYDROCHLORIDE 50 MG/1
50 TABLET, FILM COATED ORAL EVERY 8 HOURS SCHEDULED
Status: DISCONTINUED | OUTPATIENT
Start: 2018-01-12 | End: 2018-01-13 | Stop reason: HOSPADM

## 2018-01-12 RX ORDER — LISINOPRIL 40 MG/1
40 TABLET ORAL DAILY
Qty: 30 TABLET | Refills: 3 | Status: SHIPPED | OUTPATIENT
Start: 2018-01-13 | End: 2018-01-13

## 2018-01-12 RX ORDER — METOPROLOL TARTRATE 50 MG/1
50 TABLET, FILM COATED ORAL 2 TIMES DAILY
Status: DISCONTINUED | OUTPATIENT
Start: 2018-01-12 | End: 2018-01-13 | Stop reason: HOSPADM

## 2018-01-12 RX ADMIN — AMLODIPINE BESYLATE 5 MG: 5 TABLET ORAL at 09:36

## 2018-01-12 RX ADMIN — METOPROLOL SUCCINATE 50 MG: 50 TABLET, EXTENDED RELEASE ORAL at 09:37

## 2018-01-12 RX ADMIN — HYDRALAZINE HYDROCHLORIDE 50 MG: 50 TABLET, FILM COATED ORAL at 16:21

## 2018-01-12 RX ADMIN — LISINOPRIL 40 MG: 20 TABLET ORAL at 09:36

## 2018-01-12 RX ADMIN — METOPROLOL TARTRATE 50 MG: 50 TABLET ORAL at 20:09

## 2018-01-12 RX ADMIN — AMLODIPINE BESYLATE 5 MG: 5 TABLET ORAL at 17:53

## 2018-01-12 RX ADMIN — ASPIRIN 81 MG: 81 TABLET, COATED ORAL at 09:37

## 2018-01-12 RX ADMIN — HYDRALAZINE HYDROCHLORIDE 50 MG: 50 TABLET, FILM COATED ORAL at 20:09

## 2018-01-12 RX ADMIN — METOPROLOL TARTRATE 50 MG: 50 TABLET ORAL at 16:21

## 2018-01-12 RX ADMIN — HYDRALAZINE HYDROCHLORIDE 20 MG: 20 INJECTION INTRAMUSCULAR; INTRAVENOUS at 05:32

## 2018-01-12 RX ADMIN — HYDRALAZINE HYDROCHLORIDE 20 MG: 20 INJECTION INTRAMUSCULAR; INTRAVENOUS at 00:12

## 2018-01-12 RX ADMIN — ATORVASTATIN CALCIUM 40 MG: 40 TABLET, FILM COATED ORAL at 20:09

## 2018-01-12 ASSESSMENT — ACTIVITIES OF DAILY LIVING (ADL): MODE OF TRANSPORTATION: CAR

## 2018-01-12 NOTE — CARE COORDINATION
Facility/Department: [facility] CASE MANAGEMENT    NAME: Radhika Howard      : 1960  MRN: [id number]  [id number]      Social/Functional History  Social/Functional History  Lives With: Significant other  Type of Home: Apartment (second floor)  Home Layout: One level  Home Access: Elevator  Bathroom Shower/Tub: Tub/Shower unit, Curtain  Bathroom Toilet: Handicap height  Home Equipment:  (NA)  ADL Assistance: Independent  Homemaking Assistance: Independent  Homemaking Responsibilities: No  Ambulation Assistance: Independent  Transfer Assistance: Independent  Active : Yes  Mode of Transportation: Car  Occupation: Full time employment  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 Leisure & Hobbies: Enjoys spending time with his girlfriend, watching TV    I discussed with patient his discharge date planned for 18 to home with [facility] (RN, PT, OT, ST, AIDE AND A SW). Pt lives with his girlfriend and she will do driving per pt since he is not cleared for driving yet. Pt will have 2 weeks of medication supplied through our pharmacy and information was given to pt for discounted drugs at [facility].

## 2018-01-12 NOTE — CONSULTS
Inpatient consult to Cardiology  Consult performed by: Sonja Joseph  Consult ordered by: Danielle Warner      [facility] Cardiology Consult Note        Date of Consult:  2018    Patient:  Antonietta Posada    : 1960  CSN: [id number]    Consulting Cardiologist: Hugh Corbin MD    Primary Cardiologist: None / [facility]    Requesting Physician:  Kit Burris DO      Reason for Consult:  HTN, Hx CVA    SUBJECTIVE:                 Patient has near resolution of initial sxs. Mild Ataxia. Denies CP, SOB or other significant symptoms.                 BPs still high and requiring IV PRNs                 Pending DC from Rehab                 Cardiac and general ROS otherwise negative and unchanged.        Assessment:     1. Mental Status Changes  2. 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 Elevated Troponins, low flat, doubt ACS / true MI  4. Abnormal ECG ( RBBB, LAFB, NSST changes )  5. Normal LVFXN, LVEF 55%. 6. Diastolic dysfunction  7. Negative  Echo and Lexiscan Myoview Stress Tests 18  8. LVH Moderate  9. HTN, uncontrolled presently  10. HLP  11. Non Smoker     Plan:     1. Cardiac Supportive Care  2. HTN supportive medications  3. Change to PO mediations with Hydralazine PO 50mg TID Added. 4. Neurology and General Medical care. 5. OK to DC once BP and Pt Stable if ok with others. 6. Follow up as  Noted. 7. See Orders        HISTORY OF PRESENT ILLNESS:      Canelo Kingsley is a pleasant 65 y. o. male who presented with Mental status changes and findings of acute Left CVA.  Has no prior known cardiac history.  Follows with [facility].  No prior Cardiologist.      Patient history and records reviewed. Chad Ramsey was interviewed and examined.      No noted chest pain shortness of breath congestive heart failure pedal edema or palpitations.  No recent fever or chills.     Hypertensive for treatment intravenously begun.  Elevated troponins noted.  Cardiology asked to see.     Now improved dextrose, glucagon (rDNA), acetaminophen    No Known Allergies    Social History     Social History    Marital status: Single     Spouse name: N/A    Number of children: N/A    Years of education: N/A     Occupational History   [employer]       Social History Main Topics    Smoking status: Current Every Day Smoker    Smokeless tobacco: Never Used    Alcohol use No    Drug use: No    Sexual activity: Not on file     Other Topics Concern    Not on file     Social History Narrative    The patient lives with his girlfriend in a one story home with no steps to enter the home. The bedroom and bathroom are on the first floor. He works part time as a  man. His social support includes his friends. He does not have any home equipment. It is not indicated that he was receiving community services prior to admission. It is not indicated that he has history of falls prior to admission. He was independent with mobility and self care prior to admission. His goal is to get stronger and return home. Family History   Problem Relation Age of Onset    Family history unknown: Yes       Review Of Systems:    14 point ROS negative other than mentioned in HPI     Physical Exam:    CURRENT VITALS: BP (!) 166/83   Pulse 65   Temp 98 °F (36.7 °C) (Oral)   Resp 21   SpO2 97%     CONSTITUTIONAL:  awake, alert, cooperative, no apparent distress,   ENT:  Normocephalic, without obvious abnormality, atraumatic, sinuses nontender on palpation, external ears without lesions,  NECK:  Supple, symmetrical, trachea midline, no adenopathy, thyroid symmetric, not enlarged and no tenderness, skin normal, No bruits. LUNGS:  No increased work of breathing, good air exchange, clear to auscultation bilaterally, no crackles, no wheezing  CARDIOVASCULAR:  Normal apical impulse, regular rate and rhythm, normal S1 and S2,  6/4 systolic murmur noted.   ABDOMEN:  Obese, normal bowel sounds, soft, non-distended, non-tender, no masses palpated, no hepatosplenomegally  EXTREMETIES: No edema, Pulses Strong Thruout.  No ulcers. NEUROLOGIC:  Awake, alert, oriented to name, place and time. Following all commands and moving all extremties.   SKIN:  no bruising or bleeding, normal skin color, texture, turgor and no rashes  Labs:      Recent Results (from the past 24 hour(s))   POCT Glucose    Collection Time: 01/11/18  4:03 PM   Result Value Ref Range    POC Glucose 121 (H) 60 - 115 mg/dl    Performed on ACCU-CHEK    POCT Glucose    Collection Time: 01/11/18  9:36 PM   Result Value Ref Range    POC Glucose 114 60 - 115 mg/dl    Performed on ACCU-CHEK    POCT Glucose    Collection Time: 01/12/18  6:26 AM   Result Value Ref Range    POC Glucose 98 60 - 115 mg/dl    Performed on ACCU-CHEK    Basic Metabolic Panel    Collection Time: 01/12/18  7:29 AM   Result Value Ref Range    Sodium 138 132 - 144 mEq/L    Potassium 4.2 3.5 - 5.1 mEq/L    Chloride 102 98 - 107 mEq/L    CO2 26 22 - 29 mEq/L    Anion Gap 10 7 - 13 mEq/L    Glucose 91 74 - 109 mg/dL    BUN 13 6 - 20 mg/dL    CREATININE 0.72 0.70 - 1.20 mg/dL    GFR Non-African American >60.0 >60    GFR  >60.0 >60    Calcium 8.3 (L) 8.6 - 10.2 mg/dL   CBC    Collection Time: 01/12/18  7:29 AM   Result Value Ref Range    WBC 7.5 4.8 - 10.8 K/uL    RBC 4.49 (L) 4.70 - 6.10 M/uL    Hemoglobin 13.4 (L) 14.0 - 18.0 g/dL    Hematocrit 39.6 (L) 42.0 - 52.0 %    MCV 88.3 80.0 - 100.0 fL    MCH 29.8 27.0 - 31.3 pg    MCHC 33.7 33.0 - 37.0 %    RDW 14.2 11.5 - 14.5 %    Platelets 698 512 - 324 K/uL   POCT Glucose    Collection Time: 01/12/18 11:32 AM   Result Value Ref Range    POC Glucose 103 60 - 115 mg/dl    Performed on ACCU-CHEK                  EKG:              Normal sinus rhythm              Left axis deviation              Right bundle branch block              Left ventricular hypertrophy with repolarization abnormality              Abnormal ECG                 Echo:

## 2018-01-12 NOTE — PROGRESS NOTES
Arnoldo Valencia,  Seen for evaluation and education on controlled DM 2.    Lab Results   Component Value Date    LABA1C 5.5 01/06/2018     No results found for: EAG    Discussed with Arnoldo Valencia his current diabetes self-care routines prior to this admission. Has been taking 500 mg of Metformin daily and not checking BG due to not having a meter. No insurance coverage right now, so discussed him going to [facility] and getting one of their meters and strips because they would be the most affordable. Briefly discussed role of diet, physical activity, daily use of medications and self-monitoring for good diabetes control, provided diabetes education survival packet. Discussed signs and symptoms of hypo/hyperglycemia, BG guidelines, and Rule of 15  handouts provided. Provided patient with card and contact information for out-patient Diabetes education services and encouraged follow up with a PCP/endocrinologist.    Would recommend follow -up education at outpatient diabetes education at [facility]. An order is needed for this service and can be placed via EHR. Discharge Navigator --- Med Reconciliation -- New orders for discharge tab -- search diabetic ed - choose REF 21  Department Diabetes Education - review and sign. States he would be interested in attending classes. Patient verbalizes understanding  of survival skills education.           Concha Polanco RN

## 2018-01-12 NOTE — PROGRESS NOTES
Facility/Department: [facility]  Physical Therapy Acute Rehab Discharge Summary  [facility]    NAME: Alexis Herman  : 1960  MRN: [id number]    Admission Date: 1/10/2018  8:38 PM  Discharge Date: 2018    Rehab Diagnosis(es): L hemiparesis secondary to R basal ganglian infarct  Patient Active Problem List    Diagnosis Date Noted    Morbid obesity due to excess calories ([address] 2018    Stress at home-financial 2018    Abnormality of gait and mobility w/Lt hemiparesis secondary to Rt basal ganglia infarct, [facility] Rehab admit 01/10/18     Diabetes mellitus ([address]     Left hemiparesis ([address]     Essential hypertension 01/10/2018    Cerebrovascular accident (CVA) due to occlusion of right middle cerebral artery ([address] 01/10/2018    Dysphagia, oropharyngeal phase 01/10/2018    Gait abnormality 01/10/2018    Jarrett-neglect of left side 01/10/2018       Past Medical History:   Diagnosis Date    Abnormality of gait and mobility     Cerebrovascular accident (CVA) due to occlusion of right middle cerebral artery ([address] 1/10/2018    right basal ganglia raises the possibility of an acute or subacute small \"lacunar\" infarction within the right basal ganglia    Diabetes mellitus ([address]     Dysphagia, oropharyngeal phase 1/10/2018    Jarrett-neglect of left side 1/10/2018    Hypertension     Left hemiparesis (HCC)     Right Basal ganglia infarction [facility])      History reviewed. No pertinent surgical history. Indications for Skilled Intervention: Decreased coordination, Decreased safe awareness, Decreased vision/visual deficit    GOALS:  Short term goals  Short term goal 1: Pt to comlete HEP with indep - MET  Long term goals  Long term goal 1: Pt to complete 12 steps without HR indep - MET  Long term goal 2: Pt to complete SLS X 10sec - Partially MET: SLS without UE support 2-3seconds Juancho. Stood SLS with 1 finger tip support 30sec Juancho.   Long term goal 3: Pt to ambulate 500ft in halls, negotiating

## 2018-01-12 NOTE — PROGRESS NOTES
no motor speech deficits. · Swallow: Patient demonstrates minimal dysphagia.                      Clinical Bedside assessment was completed on 1-11-18                       Instrumental assessment was not completed                                Swallowing Guidelines: Patient tolerating soft foods/ thin liquids              Patient is discharged to [address] [place] [address]

## 2018-01-12 NOTE — PROGRESS NOTES
or     Sensation-left neglect  Lungs:  Diminished CTA-B. Respiration effort is normal at rest.     Heart:   S1 = S2, RRR. No loud murmurs. Abdomen:  Soft, non-tender, no enlargement of liver or spleen. Extremities:  No significant lower extremity edema or tenderness. Skin:   Intact to general survey, no visualized or palpated problems. Rehabilitation:  Physical therapy: FIMS:  Bed Mobility:      Transfers: Sit to Stand: Independent  Stand to sit: Independent  Bed to Chair: Independent, Ambulation 1  Surface: level tile  Device: No Device  Assistance: Modified Independent  Quality of Gait: Pt ambulated from dept to room focusing on distance, balance, and path finding. No LOB and able to afind room without cues. Ext rot Juancho LEs. Distance: 400ft  Comments: Protected areas @ independent. Supervision level for community environments. , Stairs  # Steps :  (12 + 10)  Stairs Height: 6\"  Rails:  (negotiates 12 without HR and 10 with R HR)  Curbs: 4\" (X 4 outside in parking lot)  Device: No Device  Assistance: Supervision, Modified independent   Comment: Pt impulsive and fails to have R foot completely on step prior to loading limb. Verbal cues for pacing with improved performance. FIMS: Bed, Chair, Wheel Chair: 7 - Patient independently transfers  Walk: 7- Complete [place]  Walks/operates wheelchair at least 150 feet Independently with no assistive device  Distance Walked: 500ft  Stairs: 6 - Modified [place] Safely goes up and down at least one flight of stairs but requries a side support, handrail, cane, or portable supports, or the activity takes more than a reasonable amount of times, or there are safety considerations,  , Assessment: Pt mildly challenged gait with head turns and with right/left directional cues.       Occupational therapy: FIMS:  Eatin - Feeds self with adaptive equipment/dentures and/or feeds self with modified diet and/or performs own tube feeding  Groomin - Did not occur  Bathin - Did not occur  Dressing-Upper: 0 - Did not occur  Dressing-Lower: 0 - Did not occur  Toiletin - Patient independent with all 3 tasks  Toilet Transfer: 7 -Patient independent on and off toilet/BSC  Tub Transfer: 0 - Activity does not occur  Shower Transfer: 5 - Supervision, set-up, cues,  ,      Speech therapy: FIMS: Comprehension: 5 - Patient understands basic needs (hungry/hot/pain)  Expression: 6 - Device used to express complex ideas/needs  Social Interaction: 5 - Patient is appropriate with supervision/cues  Problem Solvin - Patient able to solve simple/routine tasks  Memory: 5 - Patient requires prompting with stress/unfamiliar situations      Lab/X-ray studies reviewed, analyzed and discussed with patient and staff:   Recent Results (from the past 24 hour(s))   POCT Glucose    Collection Time: 18 12:06 PM   Result Value Ref Range    POC Glucose 106 60 - 115 mg/dl    Performed on ACCU-CHEK    POCT Glucose    Collection Time: 18  4:03 PM   Result Value Ref Range    POC Glucose 121 (H) 60 - 115 mg/dl    Performed on ACCU-CHEK    POCT Glucose    Collection Time: 18  9:36 PM   Result Value Ref Range    POC Glucose 114 60 - 115 mg/dl    Performed on ACCU-CHEK    POCT Glucose    Collection Time: 18  6:26 AM   Result Value Ref Range    POC Glucose 98 60 - 115 mg/dl    Performed on ACCU-CHEK    Basic Metabolic Panel    Collection Time: 18  7:29 AM   Result Value Ref Range    Sodium 138 132 - 144 mEq/L    Potassium 4.2 3.5 - 5.1 mEq/L    Chloride 102 98 - 107 mEq/L    CO2 26 22 - 29 mEq/L    Anion Gap 10 7 - 13 mEq/L    Glucose 91 74 - 109 mg/dL    BUN 13 6 - 20 mg/dL    CREATININE 0.72 0.70 - 1.20 mg/dL    GFR Non-African American >60.0 >60    GFR  >60.0 >60    Calcium 8.3 (L) 8.6 - 10.2 mg/dL   CBC    Collection Time: 18  7:29 AM   Result Value Ref Range    WBC 7.5 4.8 - 10.8 K/uL    RBC 4.49 (L) 4.70 - 6.10 M/uL    Hemoglobin 13.4 (L)

## 2018-01-12 NOTE — PROGRESS NOTES
Physical Therapy Rehab Treatment Note  Facility/Department: [facility]  [facility]       NAME: Nyasia Ballesteros  : 1960 (6 y.o.)  MRN: [id number]  CODE STATUS: Full Code    Date of Service: 2018     Restrictions:  Restrictions/Precautions: Fall Risk  There is no height or weight on file to calculate BMI. SUBJECTIVE: Subjective: No new reports. Response To Previous Treatment: Patient with no complaints from previous session. Pain Screening  Patient Currently in Pain: No     Post Treatment Pain Screenin/10     OBJECTIVE:      Transfers  Sit to Stand: Independent  Stand to sit: Independent  Bed to Chair: Independent    Ambulation  Ambulation?: Yes  WB Status: unrestricted  Ambulation 1  Surface: level tile;carpet;ramp  Assistance: Modified Independent  Quality of Gait: Externally rotated LEs otherwaise good quality without LOB. Distance: 500ft     Exercises  Neurodevelopmental Techniques: SLS without UE support 2-3seconds Juancho. Stood SLS with 1 finger tip support 30sec Juancho. Standing on faom SLS with 1 UE support to build ankle stability. Standing on foam with B LEs no UE support 45 sec with increased ankle stratigies. Ambulated 20ftx2 with head turns. Slower pace but no LOB. Comments: Reviewed and issued handout of sink ex for HEP. x10 reps ea. Pt complete indep with handout after demoing. ASSESSMENT/COMMENTS: Goals met. Difficulty with SLS activities without UE support. Improved with finger tip support.        PLAN OF CARE/Safety: D/C     Therapy Time:   Individual   Time In 1400   Time Out 1430   Minutes 30     Minutes:      Transfer/Bed mobility training:      Gait training:10      Neuro re education:10     Therapeutic ex:Jose Luis Romano PTA, 18 at 2:25 PM

## 2018-01-13 VITALS
HEART RATE: 66 BPM | TEMPERATURE: 97 F | OXYGEN SATURATION: 97 % | RESPIRATION RATE: 20 BRPM | SYSTOLIC BLOOD PRESSURE: 183 MMHG | DIASTOLIC BLOOD PRESSURE: 88 MMHG

## 2018-01-13 LAB
ANION GAP SERPL CALCULATED.3IONS-SCNC: 10 MEQ/L (ref 7–13)
BUN BLDV-MCNC: 14 MG/DL (ref 6–20)
CALCIUM SERPL-MCNC: 8.4 MG/DL (ref 8.6–10.2)
CHLORIDE BLD-SCNC: 101 MEQ/L (ref 98–107)
CO2: 28 MEQ/L (ref 22–29)
CREAT SERPL-MCNC: 0.79 MG/DL (ref 0.7–1.2)
GFR AFRICAN AMERICAN: >60
GFR NON-AFRICAN AMERICAN: >60
GLUCOSE BLD-MCNC: 103 MG/DL (ref 60–115)
GLUCOSE BLD-MCNC: 86 MG/DL (ref 74–109)
GLUCOSE BLD-MCNC: 93 MG/DL (ref 60–115)
PERFORMED ON: NORMAL
PERFORMED ON: NORMAL
POTASSIUM SERPL-SCNC: 4.7 MEQ/L (ref 3.5–5.1)
SODIUM BLD-SCNC: 139 MEQ/L (ref 132–144)

## 2018-01-13 PROCEDURE — 6370000000 HC RX 637 (ALT 250 FOR IP): Performed by: INTERNAL MEDICINE

## 2018-01-13 PROCEDURE — 6370000000 HC RX 637 (ALT 250 FOR IP): Performed by: PHYSICAL MEDICINE & REHABILITATION

## 2018-01-13 PROCEDURE — 80048 BASIC METABOLIC PNL TOTAL CA: CPT

## 2018-01-13 PROCEDURE — 97530 THERAPEUTIC ACTIVITIES: CPT

## 2018-01-13 PROCEDURE — 36415 COLL VENOUS BLD VENIPUNCTURE: CPT

## 2018-01-13 RX ORDER — ATORVASTATIN CALCIUM 40 MG/1
40 TABLET, FILM COATED ORAL NIGHTLY
Qty: 30 TABLET | Refills: 3 | Status: SHIPPED | OUTPATIENT
Start: 2018-01-13 | End: 2018-01-26 | Stop reason: SDUPTHER

## 2018-01-13 RX ORDER — METOPROLOL TARTRATE 50 MG/1
50 TABLET, FILM COATED ORAL 2 TIMES DAILY
Qty: 60 TABLET | Refills: 3 | Status: SHIPPED | OUTPATIENT
Start: 2018-01-13 | End: 2018-01-26 | Stop reason: SDUPTHER

## 2018-01-13 RX ORDER — HYDRALAZINE HYDROCHLORIDE 50 MG/1
50 TABLET, FILM COATED ORAL EVERY 8 HOURS SCHEDULED
Qty: 90 TABLET | Refills: 3 | Status: SHIPPED | OUTPATIENT
Start: 2018-01-13 | End: 2018-01-13

## 2018-01-13 RX ORDER — HYDRALAZINE HYDROCHLORIDE 50 MG/1
50 TABLET, FILM COATED ORAL EVERY 8 HOURS SCHEDULED
Qty: 90 TABLET | Refills: 3 | Status: SHIPPED | OUTPATIENT
Start: 2018-01-13 | End: 2018-01-26 | Stop reason: SDUPTHER

## 2018-01-13 RX ORDER — AMLODIPINE BESYLATE 5 MG/1
5 TABLET ORAL 2 TIMES DAILY
Qty: 30 TABLET | Refills: 3 | Status: SHIPPED | OUTPATIENT
Start: 2018-01-13 | End: 2018-01-26 | Stop reason: SDUPTHER

## 2018-01-13 RX ORDER — LISINOPRIL 40 MG/1
40 TABLET ORAL DAILY
Qty: 30 TABLET | Refills: 3 | Status: SHIPPED | OUTPATIENT
Start: 2018-01-13 | End: 2018-01-26 | Stop reason: SDUPTHER

## 2018-01-13 RX ADMIN — METOPROLOL TARTRATE 50 MG: 50 TABLET ORAL at 08:24

## 2018-01-13 RX ADMIN — LISINOPRIL 40 MG: 20 TABLET ORAL at 08:23

## 2018-01-13 RX ADMIN — HYDRALAZINE HYDROCHLORIDE 50 MG: 50 TABLET, FILM COATED ORAL at 05:56

## 2018-01-13 RX ADMIN — ATORVASTATIN CALCIUM 40 MG: 40 TABLET, FILM COATED ORAL at 17:12

## 2018-01-13 RX ADMIN — AMLODIPINE BESYLATE 5 MG: 5 TABLET ORAL at 08:23

## 2018-01-13 RX ADMIN — HYDRALAZINE HYDROCHLORIDE 50 MG: 50 TABLET, FILM COATED ORAL at 13:50

## 2018-01-13 RX ADMIN — AMLODIPINE BESYLATE 5 MG: 5 TABLET ORAL at 16:49

## 2018-01-13 RX ADMIN — ASPIRIN 81 MG: 81 TABLET, COATED ORAL at 08:23

## 2018-01-13 ASSESSMENT — PAIN SCALES - GENERAL
PAINLEVEL_OUTOF10: 0

## 2018-01-13 NOTE — PROGRESS NOTES
goals : Get blood pressure better       Therapy Time   Individual Concurrent Group Co-treatment   Time In 0900         Time Out 1000         Minutes 60                 NATHANIEL Mao    Electronically signed by NATHANIEL Mao on 1/13/2018 at 9:20 AM

## 2018-01-13 NOTE — PROGRESS NOTES
Physical Therapy Rehab Treatment Note  Facility/Department: [facility]  [email or web address]       NAME: Vazquez Pryor  : 1960 (67 y.o.)  MRN: [id number]  CODE STATUS: Full Code    Date of Service: 2018  Chart Reviewed: Yes  Family / Caregiver Present: No    Restrictions:  Restrictions/Precautions: Fall Risk  There is no height or weight on file to calculate BMI. SUBJECTIVE: Subjective: Patient feels he is ready to go home. Response To Previous Treatment: Patient with no complaints from previous session. Pain Screening  Patient Currently in Pain: No  Pre Treatment Pain Screening  Pain at present: 0  Scale Used: Numeric Score    Post Treatment Pain Screening 0/10  :Pain Assessment  Pain Assessment: 0-10  Pain Level: 0    OBJECTIVE:   Overall Orientation Status: Within Normal Limits  Orientation Level: Oriented X4                  Bed mobility  Rolling to Left: Independent  Rolling to Right: Independent  Supine to Sit: Independent  Sit to Supine: Modified independent    Transfers  Sit to Stand: Independent  Stand to sit:  Independent  Bed to Chair: Independent    Ambulation  Ambulation?: Yes  WB Status: unrestricted  Ambulation 1  Surface: level tile;carpet;ramp  Device: No Device  Assistance: Modified Independent  Quality of Gait: No Lob, ER BLE's, steady reciprocol pattern  Distance: 500 feet  Comments:    Stairs/Curb  Stairs?: Yes   Stairs  # Steps : 22 (2 flights up and down in snider stairwell.  )  Stairs Height: 6\"  Curbs: 6\"  Assistance: Modified independent   Comment:      Activity Tolerance  Activity Tolerance: Patient Tolerated treatment well    Sink Exercises x20 each 1 finger support at // bars    ASSESSMENT/COMMENTS:  Body structures, Functions, Activity limitations: Decreased coordination;Decreased safe awareness;Decreased vision/visual deficit  Assessment: Challenged by higher level balance activity this pm  Patient Education: Reviewed HEP    PLAN OF CARE/Safety:   Safety Devices  Type of devices:  All fall risk precautions in place      Therapy Time:   Individual   Time In 1300   Time Out 1330   Minutes 30     Minutes:30      Transfer/Bed mobility trainin      Gait training:15      Neuro re education:0     Therapeutic ex:15      Luz Maldonado PTA, 18 at 4:55 PM

## 2018-01-13 NOTE — PROGRESS NOTES
activity this pm  Patient Education: Reviewed HEP    PLAN OF CARE/Safety:   Safety Devices  Type of devices:  All fall risk precautions in place      Therapy Time:   Individual   Time In 0830   Time Out 0900   Minutes 30     Minutes:      Transfer/Bed mobility training:10      Gait trainin      Neuro re education:8     Therapeutic ex:0      Ash Rodriguez PTA, 18 at 4:52 PM

## 2018-01-13 NOTE — PROGRESS NOTES
Patient goals : Get blood pressure better       Therapy Time   Individual Concurrent Group Co-treatment   Time In 1430         Time Out 1530         Minutes 60                 NATHANIEL Lawrence    Electronically signed by NATHANIEL Lawrence on 1/13/2018 at 2:44 PM

## 2018-01-13 NOTE — PROGRESS NOTES
CARDIOLOGY [address] [facility] PROGRESS NOTE         1/13/2018      Treva Dudley    [id number]  1960    Rounding MD: Ralph Rebolledo MD ,[facility]     Primary Cardiologist: None / [facility]     Requesting Physician:  Kevin Dailey DO        Reason for Initial Consult:  Elevated Troponins, CVA        SUBJECTIVE:     Patient has near resolution of initial sxs. Mild Ataxia. Denies CP, SOB or other significant symptoms. BP better controlled with Meds. Pending Rehab DC     Cardiac and general ROS otherwise negative and unchanged.       Assessment:     1. Mental Status Changes  2. 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 Elevated Troponins, low flat, doubt ACS / true MI  4. Abnormal ECG ( RBBB, LAFB, NSST changes )  5. Normal LVFXN, LVEF 55%. 6. Diastolic dysfunction  7. Negative  Echo and Lexiscan Myoview Stress Tests 1/9/18  8. LVH Moderate  9. HTN, uncontrolled presently  10. HLP  11. Non Smoker     Plan:     1. Cardiac Supportive Care  2. HTN supportive medications  3. Adjust PO mediations as needed. 4. Neurology and General Medical care. 5. OK to DC home form CV point once ok with others  6. Cardiology SIgn off  7. See Orders        HISTORY OF PRESENT ILLNESS:      Treva Dudley is a pleasant 65 y.o. male who presented with Mental status changes and findings of acute Left CVA. Has no prior known cardiac history. Follows with [facility]. No prior Cardiologist.      Patient history and records reviewed. Patient was interviewed and examined.      No noted chest pain shortness of breath congestive heart failure pedal edema or palpitations. No recent fever or chills.     Hypertensive for treatment intravenously begun. Elevated troponins noted. Cardiology asked to see.     Now improved without symptoms.   BP medications adjusted.     Cardiovascular general review of systems otherwise negative     14 system review is negative other than noted above        OBJECTIVE:     MEDICATIONS:     Scheduled Meds:   metoprolol

## 2018-01-15 NOTE — DISCHARGE SUMMARY
possible left MCA clot sign. These findings were discussed with Dr. Lakesha Ruiz in the emergency room. Ct Head Wo Contrast  Result Date: 1/5/2018  1. VAGUE HYPOATTENUATION FOCUS IN LT PARIETAL PERIVENTRICULAR WHITE MATTER IS SUSPICIOUS FOR AN ACUTE OR SUBACUTE NONHEMORRHAGIC CEREBRAL INFARCTION AND CLINICAL CORRELATION WARRANTED. 2. NO INTRACRANIAL MASS, HEMORRHAGE, \"MASS EFFECT\" OR MIDLINE SHIFT. 3. REMAINDER OF THE CT BRAIN APPEARS UNREMARKABLE. Mra Head Wo Contrast  Result Date: 1/6/2018  NEGATIVE MRA HEAD WITHOUT IV CONTRAST. Xr Chest Portable  Result Date: 1/5/2018  MINIMAL BIBASILAR ATELECTASIS/SMALL INFILTRATES. CLINICAL CORRELATION AND FOLLOW-UP PLAIN FILM RECOMMENDED. Mri Brain Wo Contrast  Result Date: 1/6/2018  1. Equivocal small restricted diffusion focus in the right basal ganglia raises the possibility of an acute or subacute small \"lacunar\" infarction within the right basal ganglia and clinical correlation warranted. 2. No restricted diffusion in the left cerebral hemisphere or elsewhere in the brain. 3. Bilateral nonspecific punctate and patchy white matter T2 hyperintense foci and differential considerations include a sequela of small vessel disease or vasculopathy, vasculitis and demyelinating disease. 4. No intracranial mass, hemorrhage, \"mass effect\" or midline shift. Us Carotid Artery Bilateral  Result Date: 1/7/2018  1. RIGHT INTERNAL CAROTID ARTERY: <50% STENOSIS. 2. LEFT INTERNAL CAROTID ARTERY: <50% STENOSIS. 3. MINIMAL CAROTID PLAQUE BURDEN IN BOTH CAROTID BIFURCATIONS IN THE NECK. 4. SUBOPTIMAL VISUALIZATION OF THE VERTEBRAL ARTERIES IN THE NECK. Previous extensive, complex labs, notes and diagnostics reviewed and analyzed. ALLERGIES:    Allergies as of 01/10/2018    (No Known Allergies)      (please also verify by checking MAR)    Today I evaluated this patient for periodic reassessment of medical and functional status.   The patient was discussed in detail at the

## 2018-01-24 ENCOUNTER — OFFICE VISIT (OUTPATIENT)
Dept: FAMILY MEDICINE CLINIC | Age: 58
End: 2018-01-24
Payer: MEDICAID

## 2018-01-24 VITALS
DIASTOLIC BLOOD PRESSURE: 86 MMHG | SYSTOLIC BLOOD PRESSURE: 126 MMHG | HEIGHT: 67 IN | OXYGEN SATURATION: 98 % | WEIGHT: 212.8 LBS | TEMPERATURE: 97 F | BODY MASS INDEX: 33.4 KG/M2 | RESPIRATION RATE: 16 BRPM | HEART RATE: 75 BPM

## 2018-01-24 DIAGNOSIS — I10 ESSENTIAL HYPERTENSION: ICD-10-CM

## 2018-01-24 DIAGNOSIS — Z72.0 TOBACCO ABUSE: ICD-10-CM

## 2018-01-24 DIAGNOSIS — R73.09 ABNORMAL BLOOD SUGAR: ICD-10-CM

## 2018-01-24 DIAGNOSIS — Z09 HOSPITAL DISCHARGE FOLLOW-UP: ICD-10-CM

## 2018-01-24 DIAGNOSIS — E66.01 MORBID OBESITY DUE TO EXCESS CALORIES (HCC): ICD-10-CM

## 2018-01-24 DIAGNOSIS — Z23 FLU VACCINE NEED: ICD-10-CM

## 2018-01-24 DIAGNOSIS — I63.511 CEREBROVASCULAR ACCIDENT (CVA) DUE TO OCCLUSION OF RIGHT MIDDLE CEREBRAL ARTERY (HCC): Primary | ICD-10-CM

## 2018-01-24 LAB
CREATININE URINE: 126.8 MG/DL
MICROALBUMIN UR-MCNC: 5 MG/DL
MICROALBUMIN/CREAT UR-RTO: 39.4 MG/G (ref 0–30)

## 2018-01-24 PROCEDURE — 99204 OFFICE O/P NEW MOD 45 MIN: CPT | Performed by: PHYSICIAN ASSISTANT

## 2018-01-24 PROCEDURE — 90471 IMMUNIZATION ADMIN: CPT | Performed by: PHYSICIAN ASSISTANT

## 2018-01-24 PROCEDURE — 90688 IIV4 VACCINE SPLT 0.5 ML IM: CPT | Performed by: PHYSICIAN ASSISTANT

## 2018-01-24 ASSESSMENT — ENCOUNTER SYMPTOMS
SHORTNESS OF BREATH: 0
SORE THROAT: 0
WHEEZING: 0
CHEST TIGHTNESS: 0
BACK PAIN: 0
COLOR CHANGE: 0
TROUBLE SWALLOWING: 0
SINUS PRESSURE: 0
CONSTIPATION: 0
COUGH: 0
BLOOD IN STOOL: 0
ABDOMINAL DISTENTION: 0
ABDOMINAL PAIN: 0
NAUSEA: 0
STRIDOR: 0
VOICE CHANGE: 0

## 2018-01-24 ASSESSMENT — PATIENT HEALTH QUESTIONNAIRE - PHQ9
1. LITTLE INTEREST OR PLEASURE IN DOING THINGS: 0
SUM OF ALL RESPONSES TO PHQ QUESTIONS 1-9: 0
2. FEELING DOWN, DEPRESSED OR HOPELESS: 0
SUM OF ALL RESPONSES TO PHQ9 QUESTIONS 1 & 2: 0

## 2018-01-24 NOTE — LETTER
[id number] [facility]  [address] [facility] [address]  Phone: [phone]  Fax: [phone]    Clara Angelica        January 24, 2018     Patient: Ana Torres   YOB: 1960   Date of Visit: 1/24/2018       To Whom it May Concern:    Nicholas Cobos was seen in my clinic on 1/24/2018. He may return to work on 01/24/2018. If you have any questions or concerns, please don't hesitate to call.     Sincerely,         Cyrus Giordano PA-C

## 2018-01-24 NOTE — PROGRESS NOTES
Microalbumin / Creatinine Urine Ratio    Hemoglobin A1C   5. Flu vaccine need  INFLUENZA, QUADV, 3 YRS AND OLDER, IM, MDV, 0.5ML ([address])   6. Tobacco abuse     7. Hospital discharge follow-up  Amb External Referral To [facility] [address] to return to work. -Follow up with cardiology and neurology   -Flu vaccine today.  -Consider chantix therapy for smoking cessation.   -Home health referral for evaluation.   -Check Hgb1c as patient has not been taking metformin in about a month. -Return in 3 months or sooner if there is any concern. Orders Placed This Encounter   Procedures    INFLUENZA, QUADV, 3 YRS AND OLDER, IM, MDV, 0.5ML (FLUZONE QUADV)    Microalbumin / Creatinine Urine Ratio     Standing Status:   Future     Standing Expiration Date:   1/24/2019    Hemoglobin A1C     Standing Status:   Future     Standing Expiration Date:   1/24/2019    Amb External Referral To Home Health     Referral Priority:   Routine     Referral Type:   Consult for Advice and Opinion     Referral Reason:   Specialty Services Required     Referral Location:   [address] [place] [address]     Referred to Provider:   [address] [place] [address]     Requested Specialty:   Hospital     Number of Visits Requested:   1     No orders of the defined types were placed in this encounter. Medications Discontinued During This Encounter   Medication Reason    nicotine (NICODERM CQ) 7 MG/24HR Patient Choice    lisinopril (PRINIVIL;ZESTRIL) 40 MG tablet Duplicate Order     Return in about 3 months (around 4/24/2018) for follow up . Reviewed with the patient: current clinical status, medications, activities and diet. Side effects, adverse effects of the medication prescribed today, as well as treatment plan/ rationale and result expectations have been discussed with the patient who expresses understanding and desires to proceed.     Close follow up to evaluate treatment results and for coordination of care.  I have reviewed the patient's medical history in detail and updated the computerized patient record.     Sydnee Brooks PA-C

## 2018-01-26 NOTE — PROGRESS NOTES
Vaccine Information Sheet, \"Influenza - Inactivated\"  given to Dianelys Contreras, or parent/legal guardian of  Dianelys Contreras and verbalized understanding. Patient responses:    Have you ever had a reaction to a flu vaccine? No  Are you able to eat eggs without adverse effects? Yes  Do you have any current illness? No  Have you ever had Guillian [place] Syndrome? No    Flu vaccine given per order. Please see immunization tab.

## 2018-01-27 RX ORDER — ASPIRIN 81 MG/1
81 TABLET ORAL DAILY
Qty: 30 TABLET | Refills: 3 | Status: SHIPPED | OUTPATIENT
Start: 2018-01-27 | End: 2019-05-30 | Stop reason: SDUPTHER

## 2018-01-27 RX ORDER — HYDRALAZINE HYDROCHLORIDE 50 MG/1
50 TABLET, FILM COATED ORAL EVERY 8 HOURS SCHEDULED
Qty: 90 TABLET | Refills: 3 | Status: SHIPPED | OUTPATIENT
Start: 2018-01-27 | End: 2018-06-21 | Stop reason: SDUPTHER

## 2018-01-27 RX ORDER — ATORVASTATIN CALCIUM 40 MG/1
40 TABLET, FILM COATED ORAL NIGHTLY
Qty: 30 TABLET | Refills: 3 | Status: SHIPPED | OUTPATIENT
Start: 2018-01-27 | End: 2019-05-30 | Stop reason: SDUPTHER

## 2018-01-27 RX ORDER — AMLODIPINE BESYLATE 5 MG/1
5 TABLET ORAL 2 TIMES DAILY
Qty: 30 TABLET | Refills: 3 | Status: SHIPPED | OUTPATIENT
Start: 2018-01-27 | End: 2018-06-21 | Stop reason: SDUPTHER

## 2018-01-27 RX ORDER — LISINOPRIL 40 MG/1
40 TABLET ORAL DAILY
Qty: 30 TABLET | Refills: 3 | Status: SHIPPED | OUTPATIENT
Start: 2018-01-27 | End: 2019-05-30 | Stop reason: SDUPTHER

## 2018-01-27 RX ORDER — METOPROLOL TARTRATE 50 MG/1
50 TABLET, FILM COATED ORAL 2 TIMES DAILY
Qty: 60 TABLET | Refills: 3 | Status: SHIPPED | OUTPATIENT
Start: 2018-01-27 | End: 2018-08-09 | Stop reason: SDUPTHER

## 2018-02-01 ENCOUNTER — HOSPITAL ENCOUNTER (EMERGENCY)
Age: 58
Discharge: HOME | End: 2018-02-01
Attending: EMERGENCY MEDICINE

## 2018-02-01 VITALS
TEMPERATURE: 98 F | HEIGHT: 67 IN | DIASTOLIC BLOOD PRESSURE: 96 MMHG | RESPIRATION RATE: 16 BRPM | OXYGEN SATURATION: 97 % | WEIGHT: 200 LBS | SYSTOLIC BLOOD PRESSURE: 206 MMHG | BODY MASS INDEX: 31.39 KG/M2 | HEART RATE: 72 BPM

## 2018-02-01 DIAGNOSIS — I10 CHRONIC HYPERTENSION: ICD-10-CM

## 2018-02-01 DIAGNOSIS — R31.0 GROSS HEMATURIA: Primary | ICD-10-CM

## 2018-02-01 LAB
BILIRUBIN URINE: NEGATIVE
BLOOD, URINE: ABNORMAL
CLARITY: CLEAR
COLOR: YELLOW
EPITHELIAL CELLS, UA: ABNORMAL /HPF
GLUCOSE URINE: NEGATIVE MG/DL
KETONES, URINE: NEGATIVE MG/DL
LEUKOCYTE ESTERASE, URINE: NEGATIVE
NITRITE, URINE: NEGATIVE
PH UA: 6.5 (ref 5–9)
PROTEIN UA: 100 MG/DL
RBC UA: ABNORMAL /HPF (ref 0–2)
SPECIFIC GRAVITY UA: 1.02 (ref 1–1.03)
URINE REFLEX TO CULTURE: YES
UROBILINOGEN, URINE: 1 E.U./DL
WBC UA: ABNORMAL /HPF (ref 0–5)

## 2018-02-01 PROCEDURE — 87086 URINE CULTURE/COLONY COUNT: CPT

## 2018-02-01 PROCEDURE — 81001 URINALYSIS AUTO W/SCOPE: CPT

## 2018-02-01 PROCEDURE — 99283 EMERGENCY DEPT VISIT LOW MDM: CPT

## 2018-02-01 RX ORDER — PHENAZOPYRIDINE HYDROCHLORIDE 100 MG/1
100 TABLET, FILM COATED ORAL 3 TIMES DAILY PRN
Qty: 9 TABLET | Refills: 0 | Status: SHIPPED | OUTPATIENT
Start: 2018-02-01 | End: 2018-02-04

## 2018-02-01 ASSESSMENT — ENCOUNTER SYMPTOMS
COUGH: 0
ABDOMINAL PAIN: 0
SHORTNESS OF BREATH: 0
BACK PAIN: 0
VOMITING: 0

## 2018-02-01 ASSESSMENT — PAIN SCALES - GENERAL: PAINLEVEL_OUTOF10: 5

## 2018-02-01 ASSESSMENT — PAIN DESCRIPTION - LOCATION: LOCATION: PELVIS

## 2018-02-01 ASSESSMENT — PAIN DESCRIPTION - DESCRIPTORS: DESCRIPTORS: BURNING

## 2018-02-01 NOTE — ED PROVIDER NOTES
prior to admission. It is not indicated that he has history of falls prior to admission. He was independent with mobility and self care prior to admission. His goal is to get stronger and return home. SCREENINGS             PHYSICAL EXAM    (up to 7 for level 4, 8 or more for level 5)     ED Triage Vitals [02/01/18 0646]   BP Temp Temp Source Pulse Resp SpO2 Height Weight   (!) 206/96 98 °F (36.7 °C) Oral 72 16 97 % 5' 7\" (1.702 m) 200 lb (90.7 kg)       Physical Exam   Constitutional: He appears well-developed and well-nourished. No distress. HENT:   Head: Normocephalic and atraumatic. Mouth/Throat: Oropharynx is clear and moist.   Eyes: Conjunctivae are normal.   Pupils are equally round and reacting normally. Extraoccular muscles are grossly intact. Neck: Normal range of motion. No tracheal deviation present. Cardiovascular: Normal rate, regular rhythm, normal heart sounds and intact distal pulses. Exam reveals no friction rub. No murmur heard. Pulmonary/Chest: Effort normal and breath sounds normal. No stridor. No respiratory distress. He has no wheezes. He has no rales. He exhibits no tenderness. Abdominal: Soft. He exhibits no distension and no mass. There is no tenderness. There is no rebound and no guarding. Musculoskeletal: Normal range of motion. He exhibits no edema or deformity. Neurological: He is alert. Answering questions appropriately. No gaze deficit. No gait abnormality. Moving all extremities. Skin: Skin is warm and dry. Psychiatric: He has a normal mood and affect. Judgment normal.   Nursing note and vitals reviewed. EMERGENCY DEPARTMENT COURSE and DIFFERENTIAL DIAGNOSIS/MDM:   Vitals:    Vitals:    02/01/18 0646   BP: (!) 206/96   Pulse: 72   Resp: 16   Temp: 98 °F (36.7 °C)   TempSrc: Oral   SpO2: 97%   Weight: 200 lb (90.7 kg)   Height: 5' 7\" (1.702 m)       Patient presents to the emergency department with the complaints described above.   Vital signs MD  [address] [facility]  [address] [facility]  [phone]    In 1 week        DISCHARGE MEDICATIONS:  New Prescriptions    PHENAZOPYRIDINE (PYRIDIUM) 100 MG TABLET    Take 1 tablet by mouth 3 times daily as needed for Pain          (Please note that portions of this note were completed with a voice recognition program.  Efforts were made to edit the dictations but occasionally words are mis-transcribed.)    Shona Guerrero DO (electronically signed)  Attending Emergency Physician         Shona Guerrero DO  02/01/18 0045

## 2018-02-01 NOTE — ED NOTES
Assumed care of patient. A & Ox4. Skin pink warm and dry. States he has no complaints at this time except he is tired and wants to fall asleep. Light turned off. Pt made aware we are waiting for UA.       Jody Ahmadi RN  02/01/18 1930

## 2018-02-01 NOTE — ED TRIAGE NOTES
Pt c/o burning with urination and bloody urine x 3 days. Pt denies back pain. Pt alert and oriented, skin p/w/d, no distress noted.

## 2018-02-02 LAB — URINE CULTURE, ROUTINE: NORMAL

## 2018-06-22 RX ORDER — AMLODIPINE BESYLATE 5 MG/1
5 TABLET ORAL 2 TIMES DAILY
Qty: 30 TABLET | Refills: 3 | Status: SHIPPED | OUTPATIENT
Start: 2018-06-22 | End: 2019-05-30 | Stop reason: SDUPTHER

## 2018-06-22 RX ORDER — HYDRALAZINE HYDROCHLORIDE 50 MG/1
50 TABLET, FILM COATED ORAL EVERY 8 HOURS SCHEDULED
Qty: 90 TABLET | Refills: 3 | Status: SHIPPED | OUTPATIENT
Start: 2018-06-22 | End: 2019-05-30 | Stop reason: SDUPTHER

## 2019-05-29 ENCOUNTER — HOSPITAL ENCOUNTER (EMERGENCY)
Age: 59
Discharge: HOME | End: 2019-05-30
Payer: MEDICAID

## 2019-05-29 DIAGNOSIS — I10 ESSENTIAL HYPERTENSION: Primary | ICD-10-CM

## 2019-05-29 LAB
ALBUMIN SERPL-MCNC: 4.3 G/DL (ref 3.5–4.6)
ALP BLD-CCNC: 80 U/L (ref 35–104)
ALT SERPL-CCNC: 16 U/L (ref 0–41)
ANION GAP SERPL CALCULATED.3IONS-SCNC: 15 MEQ/L (ref 9–15)
AST SERPL-CCNC: 19 U/L (ref 0–40)
BASOPHILS ABSOLUTE: 0.1 K/UL (ref 0–0.2)
BASOPHILS RELATIVE PERCENT: 0.8 %
BILIRUB SERPL-MCNC: 0.3 MG/DL (ref 0.2–0.7)
BUN BLDV-MCNC: 18 MG/DL (ref 6–20)
CALCIUM SERPL-MCNC: 8.9 MG/DL (ref 8.5–9.9)
CHLORIDE BLD-SCNC: 97 MEQ/L (ref 95–107)
CO2: 23 MEQ/L (ref 20–31)
CREAT SERPL-MCNC: 1 MG/DL (ref 0.7–1.2)
EKG ATRIAL RATE: 102 BPM
EKG P AXIS: 51 DEGREES
EKG P-R INTERVAL: 162 MS
EKG Q-T INTERVAL: 390 MS
EKG QRS DURATION: 146 MS
EKG QTC CALCULATION (BAZETT): 508 MS
EKG R AXIS: -50 DEGREES
EKG T AXIS: 110 DEGREES
EKG VENTRICULAR RATE: 102 BPM
EOSINOPHILS ABSOLUTE: 0.3 K/UL (ref 0–0.7)
EOSINOPHILS RELATIVE PERCENT: 3.6 %
GFR AFRICAN AMERICAN: >60
GFR NON-AFRICAN AMERICAN: >60
GLOBULIN: 3.6 G/DL (ref 2.3–3.5)
GLUCOSE BLD-MCNC: 215 MG/DL (ref 70–99)
HCT VFR BLD CALC: 42.2 % (ref 42–52)
HEMOGLOBIN: 15.4 G/DL (ref 14–18)
LYMPHOCYTES ABSOLUTE: 2.3 K/UL (ref 1–4.8)
LYMPHOCYTES RELATIVE PERCENT: 29 %
MCH RBC QN AUTO: 32 PG (ref 27–31.3)
MCHC RBC AUTO-ENTMCNC: 36.4 % (ref 33–37)
MCV RBC AUTO: 87.9 FL (ref 80–100)
MONOCYTES ABSOLUTE: 0.5 K/UL (ref 0.2–0.8)
MONOCYTES RELATIVE PERCENT: 6.5 %
NEUTROPHILS ABSOLUTE: 4.7 K/UL (ref 1.4–6.5)
NEUTROPHILS RELATIVE PERCENT: 60.1 %
PDW BLD-RTO: 14.9 % (ref 11.5–14.5)
PLATELET # BLD: 138 K/UL (ref 130–400)
POTASSIUM SERPL-SCNC: 3.8 MEQ/L (ref 3.4–4.9)
RBC # BLD: 4.8 M/UL (ref 4.7–6.1)
SODIUM BLD-SCNC: 135 MEQ/L (ref 135–144)
TOTAL PROTEIN: 7.9 G/DL (ref 6.3–8)
WBC # BLD: 7.8 K/UL (ref 4.8–10.8)

## 2019-05-29 PROCEDURE — 93005 ELECTROCARDIOGRAM TRACING: CPT | Performed by: PERSONAL EMERGENCY RESPONSE ATTENDANT

## 2019-05-29 PROCEDURE — 80053 COMPREHEN METABOLIC PANEL: CPT

## 2019-05-29 PROCEDURE — 85025 COMPLETE CBC W/AUTO DIFF WBC: CPT

## 2019-05-29 PROCEDURE — 99284 EMERGENCY DEPT VISIT MOD MDM: CPT

## 2019-05-29 PROCEDURE — 6370000000 HC RX 637 (ALT 250 FOR IP): Performed by: PERSONAL EMERGENCY RESPONSE ATTENDANT

## 2019-05-29 PROCEDURE — 36415 COLL VENOUS BLD VENIPUNCTURE: CPT

## 2019-05-29 RX ORDER — HYDRALAZINE HYDROCHLORIDE 50 MG/1
50 TABLET, FILM COATED ORAL ONCE
Status: COMPLETED | OUTPATIENT
Start: 2019-05-29 | End: 2019-05-29

## 2019-05-29 RX ORDER — AMLODIPINE BESYLATE 5 MG/1
5 TABLET ORAL ONCE
Status: COMPLETED | OUTPATIENT
Start: 2019-05-29 | End: 2019-05-29

## 2019-05-29 RX ORDER — LISINOPRIL 10 MG/1
40 TABLET ORAL ONCE
Status: COMPLETED | OUTPATIENT
Start: 2019-05-29 | End: 2019-05-29

## 2019-05-29 RX ORDER — METOPROLOL TARTRATE 50 MG/1
50 TABLET, FILM COATED ORAL ONCE
Status: COMPLETED | OUTPATIENT
Start: 2019-05-29 | End: 2019-05-29

## 2019-05-29 RX ADMIN — METOPROLOL TARTRATE 50 MG: 50 TABLET ORAL at 22:51

## 2019-05-29 RX ADMIN — LISINOPRIL 40 MG: 10 TABLET ORAL at 22:51

## 2019-05-29 RX ADMIN — HYDRALAZINE HYDROCHLORIDE 50 MG: 50 TABLET, FILM COATED ORAL at 22:50

## 2019-05-29 RX ADMIN — AMLODIPINE BESYLATE 5 MG: 5 TABLET ORAL at 22:51

## 2019-05-29 ASSESSMENT — ENCOUNTER SYMPTOMS
VOMITING: 0
BLOOD IN STOOL: 0
ABDOMINAL PAIN: 0
COLOR CHANGE: 0
SORE THROAT: 0
SHORTNESS OF BREATH: 0
RHINORRHEA: 0
COUGH: 0
DIARRHEA: 0
NAUSEA: 0

## 2019-05-30 VITALS
SYSTOLIC BLOOD PRESSURE: 182 MMHG | RESPIRATION RATE: 18 BRPM | DIASTOLIC BLOOD PRESSURE: 91 MMHG | OXYGEN SATURATION: 96 % | TEMPERATURE: 98.7 F | HEART RATE: 78 BPM

## 2019-05-30 PROCEDURE — 93010 ELECTROCARDIOGRAM REPORT: CPT | Performed by: INTERNAL MEDICINE

## 2019-05-30 RX ORDER — METOPROLOL TARTRATE 50 MG/1
50 TABLET, FILM COATED ORAL 2 TIMES DAILY
Qty: 60 TABLET | Refills: 3 | Status: SHIPPED | OUTPATIENT
Start: 2019-05-30 | End: 2019-06-29

## 2019-05-30 RX ORDER — ATORVASTATIN CALCIUM 40 MG/1
40 TABLET, FILM COATED ORAL NIGHTLY
Qty: 30 TABLET | Refills: 3 | Status: SHIPPED | OUTPATIENT
Start: 2019-05-30

## 2019-05-30 RX ORDER — AMLODIPINE BESYLATE 5 MG/1
5 TABLET ORAL 2 TIMES DAILY
Qty: 30 TABLET | Refills: 3 | Status: SHIPPED | OUTPATIENT
Start: 2019-05-30

## 2019-05-30 RX ORDER — ASPIRIN 81 MG/1
81 TABLET ORAL DAILY
Qty: 30 TABLET | Refills: 3 | Status: SHIPPED | OUTPATIENT
Start: 2019-05-30

## 2019-05-30 RX ORDER — LISINOPRIL 40 MG/1
40 TABLET ORAL DAILY
Qty: 30 TABLET | Refills: 3 | Status: SHIPPED | OUTPATIENT
Start: 2019-05-30

## 2019-05-30 RX ORDER — HYDRALAZINE HYDROCHLORIDE 50 MG/1
50 TABLET, FILM COATED ORAL EVERY 8 HOURS SCHEDULED
Qty: 90 TABLET | Refills: 3 | Status: SHIPPED | OUTPATIENT
Start: 2019-05-30

## 2019-05-30 NOTE — ED NOTES
Bed: 15  Expected date: 5/29/19  Expected time: 10:16 PM  Means of arrival:   Comments:  63 M, HTN 2385/136, 110 ST, 97 % RA, A/O x 4, IV w/ labs      Saranya Colvin RN  05/29/19 4123

## 2019-05-30 NOTE — ED PROVIDER NOTES
[address] [facility] ED  eMERGENCY dEPARTMENT eNCOUnter      Pt Name: Jacob Lopez  MRN: [id number]  [place] 1960  Date of evaluation: 5/29/2019  Provider: DEION Vazquez      HISTORY OF PRESENT ILLNESS    Jacob Lopez is a 63 y.o. male with PMHx of CVA, hypertension, dysphagia, left hemiparesis, obesity, diabetes presents to the emergency department with reddened face. Pt states his face was red at home and his girlfriend called the ambulance. It was noted he was hypertensive via ambulance. Patient states he has not been taking any of his medications including blood pressure medications for one month since he does not have money. He denies headaches, physical complaints, change in his vision, dizziness, chest pain, shortness of breath, nausea, vomiting. He states his face has been red in the past with no known etiology. He does smoke cigarettes, he denies alcohol use. He denies sun exposure or new exposures. HPI    Nursing Notes were reviewed. REVIEW OF SYSTEMS       Review of Systems   Constitutional: Negative for appetite change, chills and fever. HENT: Negative for congestion, rhinorrhea and sore throat. Respiratory: Negative for cough and shortness of breath. Cardiovascular: Negative for chest pain. Gastrointestinal: Negative for abdominal pain, blood in stool, diarrhea, nausea and vomiting. Genitourinary: Negative for difficulty urinating. Musculoskeletal: Negative for neck stiffness. Skin: Negative for color change and rash. Neurological: Negative for dizziness, syncope, weakness, light-headedness, numbness and headaches. All other systems reviewed and are negative.             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 CURRENT MEDICATIONS       Current Discharge Medication List          ALLERGIES     Patient has no known allergies.     FAMILY HISTORY       Family History   Family history unknown: Yes          SOCIAL HISTORY       Social History     Socioeconomic History    Marital status: Single     Spouse name: Not on file    Number of children: Not on file    Years of education: Not on file    Highest education level: Not on file   Occupational History    Occupation: [employer]    Social Needs    Financial resource strain: Not on file    Food insecurity:     Worry: Not on file     Inability: Not on file   [facility] needs:     Medical: Not on file     Non-medical: Not on file   Tobacco Use    Smoking status: Current Every Day Smoker     Packs/day: 1.00     Years: 5.00     Pack years: 5.00     Types: Cigarettes    Smokeless tobacco: Never Used   Substance and Sexual Activity    Alcohol use: No    Drug use: No    Sexual activity: Not on file   Lifestyle    Physical activity:     Days per week: Not on file     Minutes per session: Not on file    Stress: Not on file   Relationships    Social connections:     Talks on phone: Not on file     Gets together: Not on file     Attends Worship service: Not on file     Active member of club or organization: Not on file     Attends meetings of clubs or organizations: Not on file     Relationship status: Not on file    Intimate partner violence:     Fear of current or ex partner: Not on file     Emotionally abused: Not on file     Physically abused: Not on file     Forced sexual activity: Not on file   Other Topics Concern    Not on file   Social History Narrative    The patient lives with his girlfriend in a one story home with no steps to enter the home. The bedroom and bathroom are on the first floor. He works part time as a  man. His social support includes his friends. He does not have any home equipment. It is not indicated that he was receiving community services prior to admission. It is not indicated that he has history of falls prior to admission. He was independent with mobility and self care prior to admission. His goal is to get stronger and return home. PHYSICAL EXAM         ED Triage Vitals [05/29/19 2222]   BP Temp Temp src Pulse Resp SpO2 Height Weight   (!) 239/128 98.7 °F (37.1 °C) -- 105 18 97 % -- --       Physical Exam   Constitutional: He is oriented to person, place, and time. He appears well-developed and well-nourished. HENT:   Head: Normocephalic and atraumatic. Mouth/Throat: Oropharynx is clear and moist.   Face is reddened in the room, no facial swelling or infectious appearance, no drainage. Eyes: Pupils are equal, round, and reactive to light. Conjunctivae and EOM are normal.   Neck: Normal range of motion. Neck supple. No tracheal deviation present. Cardiovascular: Normal heart sounds and intact distal pulses. Pulmonary/Chest: Effort normal and breath sounds normal. No stridor. No respiratory distress. Abdominal: Soft. Bowel sounds are normal. He exhibits no distension and no mass. There is no tenderness. There is no rebound and no guarding. Musculoskeletal: Normal range of motion. Neurological: He is alert and oriented to person, place, and time. He has normal reflexes. Skin: Skin is warm and dry. Capillary refill takes less than 2 seconds. No rash noted. Psychiatric: He has a normal mood and affect.  His behavior is normal. Judgment and thought content normal.       DIAGNOSTIC RESULTS     EKG:All EKG's are interpreted by the Emergency Department Physician who either signs or Co-signs this chart in the absence of a cardiologist.    EKG shows sinus tachycardia, heart rate 102, left ventricular hypertrophy, normal intervals, no ST segment changes. RADIOLOGY:   Non-plain film images such as CT, Ultrasound and MRI are read by theradiologist. Plain radiographic images are visualized and preliminarily interpreted by the emergency physician with the below findings:    Interpretation per theRadiologist below, if available at the time of this note:    No orders to display           LABS:  [address] - Abnormal; Notable for the following components:       Result Value    Glucose 215 (*)     Globulin 3.6 (*)     All other components within normal limits   CBC WITH AUTO DIFFERENTIAL - Abnormal; Notable for the following components:    MCH 32.0 (*)     RDW 14.9 (*)     All other components within normal limits       All other labs were within normal range or not returned as of this dictation. EMERGENCY DEPARTMENT COURSE and DIFFERENTIAL DIAGNOSIS/MDM:   Vitals:    Vitals:    05/29/19 2222 05/29/19 2302 05/30/19 0008   BP: (!) 239/128 (!) 217/128 (!) 182/91   Pulse: 105 98 78   Resp: 18  18   Temp: 98.7 °F (37.1 °C)     SpO2: 97%  96%         MDM    Lab work unremarkable. EKG shows no acute ST changes. He does arrive hypertension from noncompliance and was given his at home medications. Patient was given goodrx costs of his medications in the area. His BP has dropped, face appears slightly less reddened. CO levels will also be checked. He has been asymptomatic throughout his visit. Standard anticipatory guidance given to patient upon discharge. Have given them a specific time frame in which to follow-up and who to follow-up with. I have also advised them that they should return to the emergency department if they get worse, or not getting better or develop any new or concerning symptoms. Patient demonstrates understanding.         CRITICAL CARE TIME   Total Critical Caretime was 0 minutes, excluding separately reportable procedures. There was a high probability of clinically significant/life threatening deterioration in the patient's condition which required my urgent intervention. Procedures    FINAL IMPRESSION      1. Essential hypertension          DISPOSITION/PLAN   DISPOSITION Discharge - Pending Orders Complete 05/29/2019 11:28:30 PM      PATIENT REFERRED TO:  Laurita Myles PA-C  [address] [facility]  [phone]            DISCHARGE MEDICATIONS:  Current Discharge Medication List             (Please notethat portions of this note were completed with a voice recognition program.  Efforts were made to edit the dictations but occasionally words are mis-transcribed. )    DEION Mitchell (electronically signed)  Emergency Physician Assistant         Yanni Woodall, [address]  05/30/19 1994

## 2019-05-30 NOTE — ED NOTES
Pt ate 50% box lunch, resting with eyes closed. DEION Davis at bedside for reassessment.      Eris Cummings RN  13/69/79 1107

## 2019-06-15 ENCOUNTER — HOSPITAL ENCOUNTER (EMERGENCY)
Age: 59
Discharge: HOME | End: 2019-06-16
Attending: EMERGENCY MEDICINE
Payer: MEDICAID

## 2019-06-15 DIAGNOSIS — R73.9 HYPERGLYCEMIA: Primary | ICD-10-CM

## 2019-06-15 LAB
CHP ED QC CHECK: YES
GLUCOSE BLD-MCNC: 303 MG/DL
GLUCOSE BLD-MCNC: 303 MG/DL (ref 60–115)
PERFORMED ON: ABNORMAL

## 2019-06-15 PROCEDURE — 36415 COLL VENOUS BLD VENIPUNCTURE: CPT

## 2019-06-15 PROCEDURE — 85025 COMPLETE CBC W/AUTO DIFF WBC: CPT

## 2019-06-15 PROCEDURE — 96374 THER/PROPH/DIAG INJ IV PUSH: CPT

## 2019-06-15 PROCEDURE — 99283 EMERGENCY DEPT VISIT LOW MDM: CPT

## 2019-06-15 PROCEDURE — 2580000003 HC RX 258: Performed by: EMERGENCY MEDICINE

## 2019-06-15 PROCEDURE — 80053 COMPREHEN METABOLIC PANEL: CPT

## 2019-06-15 RX ORDER — SODIUM CHLORIDE 0.9 % (FLUSH) 0.9 %
3 SYRINGE (ML) INJECTION EVERY 8 HOURS
Status: DISCONTINUED | OUTPATIENT
Start: 2019-06-15 | End: 2019-06-16 | Stop reason: HOSPADM

## 2019-06-15 RX ORDER — 0.9 % SODIUM CHLORIDE 0.9 %
2000 INTRAVENOUS SOLUTION INTRAVENOUS ONCE
Status: COMPLETED | OUTPATIENT
Start: 2019-06-15 | End: 2019-06-16

## 2019-06-15 RX ADMIN — SODIUM CHLORIDE 2000 ML: 9 INJECTION, SOLUTION INTRAVENOUS at 23:43

## 2019-06-16 VITALS
OXYGEN SATURATION: 98 % | HEART RATE: 75 BPM | SYSTOLIC BLOOD PRESSURE: 179 MMHG | HEIGHT: 67 IN | DIASTOLIC BLOOD PRESSURE: 90 MMHG | TEMPERATURE: 98 F | BODY MASS INDEX: 31.39 KG/M2 | RESPIRATION RATE: 19 BRPM | WEIGHT: 200 LBS

## 2019-06-16 LAB
ALBUMIN SERPL-MCNC: 4.4 G/DL (ref 3.5–4.6)
ALP BLD-CCNC: 97 U/L (ref 35–104)
ALT SERPL-CCNC: 16 U/L (ref 0–41)
ANION GAP SERPL CALCULATED.3IONS-SCNC: 12 MEQ/L (ref 9–15)
AST SERPL-CCNC: 12 U/L (ref 0–40)
BASE EXCESS ARTERIAL: 1 (ref -3–3)
BASOPHILS ABSOLUTE: 0.1 K/UL (ref 0–0.2)
BASOPHILS RELATIVE PERCENT: 0.8 %
BILIRUB SERPL-MCNC: 0.3 MG/DL (ref 0.2–0.7)
BUN BLDV-MCNC: 21 MG/DL (ref 6–20)
CALCIUM IONIZED: 1.15 MMOL/L (ref 1.12–1.32)
CALCIUM SERPL-MCNC: 9.6 MG/DL (ref 8.5–9.9)
CHLORIDE BLD-SCNC: 97 MEQ/L (ref 95–107)
CO2: 30 MEQ/L (ref 20–31)
CREAT SERPL-MCNC: 1.18 MG/DL (ref 0.7–1.2)
EOSINOPHILS ABSOLUTE: 0.3 K/UL (ref 0–0.7)
EOSINOPHILS RELATIVE PERCENT: 3.4 %
GFR AFRICAN AMERICAN: >60
GFR AFRICAN AMERICAN: >60
GFR NON-AFRICAN AMERICAN: >60
GFR NON-AFRICAN AMERICAN: >60
GLOBULIN: 3.2 G/DL (ref 2.3–3.5)
GLUCOSE BLD-MCNC: 263 MG/DL (ref 60–115)
GLUCOSE BLD-MCNC: 296 MG/DL (ref 60–115)
GLUCOSE BLD-MCNC: 321 MG/DL (ref 70–99)
HCO3 ARTERIAL: 25.3 MMOL/L (ref 21–29)
HCT VFR BLD CALC: 41.9 % (ref 42–52)
HEMOGLOBIN: 12.6 GM/DL (ref 13.5–17.5)
HEMOGLOBIN: 14.7 G/DL (ref 14–18)
LACTATE: 0.67 MMOL/L (ref 0.4–2)
LYMPHOCYTES ABSOLUTE: 2.1 K/UL (ref 1–4.8)
LYMPHOCYTES RELATIVE PERCENT: 27.8 %
MCH RBC QN AUTO: 31.3 PG (ref 27–31.3)
MCHC RBC AUTO-ENTMCNC: 35.1 % (ref 33–37)
MCV RBC AUTO: 89.3 FL (ref 80–100)
MONOCYTES ABSOLUTE: 0.5 K/UL (ref 0.2–0.8)
MONOCYTES RELATIVE PERCENT: 7 %
NEUTROPHILS ABSOLUTE: 4.6 K/UL (ref 1.4–6.5)
NEUTROPHILS RELATIVE PERCENT: 61 %
O2 SAT, ARTERIAL: 94 % (ref 93–100)
PCO2 ARTERIAL: 41 MM HG (ref 35–45)
PDW BLD-RTO: 14.6 % (ref 11.5–14.5)
PERFORMED ON: ABNORMAL
PERFORMED ON: ABNORMAL
PH ARTERIAL: 7.4 (ref 7.35–7.45)
PLATELET # BLD: 162 K/UL (ref 130–400)
PO2 ARTERIAL: 71 MM HG (ref 75–108)
POC CHLORIDE: 105 MEQ/L (ref 99–110)
POC CREATININE: 1 MG/DL (ref 0.9–1.3)
POC HEMATOCRIT: 37 % (ref 41–53)
POC POTASSIUM: 3.6 MEQ/L (ref 3.5–5.1)
POC SAMPLE TYPE: ABNORMAL
POC SODIUM: 141 MEQ/L (ref 136–145)
POTASSIUM SERPL-SCNC: 4.5 MEQ/L (ref 3.4–4.9)
RBC # BLD: 4.7 M/UL (ref 4.7–6.1)
SODIUM BLD-SCNC: 139 MEQ/L (ref 135–144)
TCO2 ARTERIAL: 27 (ref 22–29)
TOTAL PROTEIN: 7.6 G/DL (ref 6.3–8)
WBC # BLD: 7.5 K/UL (ref 4.8–10.8)

## 2019-06-16 PROCEDURE — 82803 BLOOD GASES ANY COMBINATION: CPT

## 2019-06-16 PROCEDURE — 82948 REAGENT STRIP/BLOOD GLUCOSE: CPT

## 2019-06-16 PROCEDURE — 84295 ASSAY OF SERUM SODIUM: CPT

## 2019-06-16 PROCEDURE — 82565 ASSAY OF CREATININE: CPT

## 2019-06-16 PROCEDURE — 83605 ASSAY OF LACTIC ACID: CPT

## 2019-06-16 PROCEDURE — 85014 HEMATOCRIT: CPT

## 2019-06-16 PROCEDURE — 82435 ASSAY OF BLOOD CHLORIDE: CPT

## 2019-06-16 PROCEDURE — 82330 ASSAY OF CALCIUM: CPT

## 2019-06-16 PROCEDURE — 84132 ASSAY OF SERUM POTASSIUM: CPT

## 2019-06-16 PROCEDURE — 36600 WITHDRAWAL OF ARTERIAL BLOOD: CPT

## 2019-06-16 NOTE — ED NOTES
Bed: 15  Expected date:   Expected time:   Means of arrival:   Comments:  Digna Baum, [facility]  06/15/19 7581

## 2019-06-16 NOTE — ED PROVIDER NOTES
[address] [facility] ED  eMERGENCY dEPARTMENT eNCOUnter      Pt Name: So Cortez  MRN: [id number]  [place] 1960  Date of evaluation: 6/15/2019  Provider: Alejandra Gomes MD    CHIEF COMPLAINT       Chief Complaint   Patient presents with    Hyperglycemia      squad checked          HISTORY OF PRESENT ILLNESS   (Location/Symptom, Timing/Onset,Context/Setting, Quality, Duration, Modifying Factors, Severity)  Note limiting factors. So Cortez is a 63 y.o. male who presents to the emergency department he was asymptomatic but sugars have been quite high, 433 by squad. There is no polyuria, polydipsia and is been a smoker in the past.  He has been diabetic for quite a few years. No stroke symptoms, no chest pain, no cough, no shortness of breath    HPI    NursingNotes were reviewed. REVIEW OF SYSTEMS    (2-9 systems for level 4, 10 or more for level 5)     Review of Systems   Constitutional symptoms:  no Fatigue, no fever, no chills. Skin symptoms:  Negative except as documented in HPI. ENMT symptoms:  Negative except as documented in HPI. Respiratory symptoms:  Negative except as documented in HPI. Cardiovascular symptoms:  Negative except as documented in HPI. Gastrointestinal symptoms: Negative except for documented as above in the HPI    Genitourinary symptoms:  Negative except as documented in HPI. Musculoskeletal symptoms:  Negative except as documented in HPI. Neurologic symptoms:  Negative except as documented in HPI. Remainder of 10 systems, all negative except for mentioned above      Except as noted above the remainder of the review of systems was reviewed and negative.        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 No pertinent surgical history. CURRENT MEDICATIONS       Previous Medications    AMLODIPINE (NORVASC) 5 MG TABLET    Take 1 tablet by mouth 2 times daily    ASPIRIN 81 MG EC TABLET    Take 1 tablet by mouth daily    ATORVASTATIN (LIPITOR) 40 MG TABLET    Take 1 tablet by mouth nightly Okay to Transition to Mevacor which is less expensive-please discuss with cardiology or PCP    HYDRALAZINE (APRESOLINE) 50 MG TABLET    Take 1 tablet by mouth every 8 hours    LISINOPRIL (PRINIVIL;ZESTRIL) 40 MG TABLET    Take 1 tablet by mouth daily    METOPROLOL TARTRATE (LOPRESSOR) 50 MG TABLET    Take 1 tablet by mouth 2 times daily       ALLERGIES     Patient has no known allergies.     FAMILY HISTORY       Family History   Family history unknown: Yes          SOCIAL HISTORY       Social History     Socioeconomic History    Marital status: Single     Spouse name: None    Number of children: None    Years of education: None    Highest education level: None   Occupational History    Occupation: [employer]    Social Needs    Financial resource strain: None    Food insecurity:     Worry: None     Inability: None    Transportation needs:     Medical: None     Non-medical: None   Tobacco Use    Smoking status: Current Every Day Smoker     Packs/day: 1.00     Years: 5.00     Pack years: 5.00     Types: Cigarettes    Smokeless tobacco: Never Used   Substance and Sexual Activity    Alcohol use: No    Drug use: No    Sexual activity: None   Lifestyle    Physical activity:     Days per week: None     Minutes per session: None    Stress: None   Relationships    Social connections:     Talks on phone: None     Gets together: None     Attends 7\" (1.702 m)            MDM       Blood sugar is now controlled. He will be more compliant with his insulin regimen. Dietary restrictions discussed    CRITICAL CARE TIME   Total Critical Care time was  minutes, excluding separately reportableprocedures. There was a high probability of clinicallysignificant/life threatening deterioration in the patient's condition which required my urgent intervention. CONSULTS:  None    PROCEDURES:  Unless otherwise noted below, none     Procedures    FINAL IMPRESSION      1.  Hyperglycemia          DISPOSITION/PLAN   DISPOSITION Decision To Discharge 06/16/2019 12:33:44 AM      PATIENT REFERRED TO:  Librado Watt PA-C  [address] [place] [address], [address]  [address] [place] [address] [phone]    In 3 days        DISCHARGE MEDICATIONS:  New Prescriptions    No medications on file          (Please note that portions of this note were completed with a voice recognition program.  Efforts were made to edit the dictations but occasionally words are mis-transcribed.)    Lester Wu MD (electronically signed)  Attending Emergency Physician          Lester Wu MD  06/16/19 4797

## 2023-01-18 PROBLEM — M79.674 CHRONIC PAIN OF TOE OF RIGHT FOOT: Status: ACTIVE | Noted: 2023-01-18

## 2023-01-18 PROBLEM — R06.83 SNORING: Status: ACTIVE | Noted: 2023-01-18

## 2023-01-18 PROBLEM — G89.29 CHRONIC PAIN OF TOE OF LEFT FOOT: Status: ACTIVE | Noted: 2023-01-18

## 2023-01-18 PROBLEM — E66.812 CLASS 2 SEVERE OBESITY WITH SERIOUS COMORBIDITY AND BODY MASS INDEX (BMI) OF 36.0 TO 36.9 IN ADULT: Status: ACTIVE | Noted: 2023-01-18

## 2023-01-18 PROBLEM — E78.5 HYPERLIPIDEMIA: Status: ACTIVE | Noted: 2023-01-18

## 2023-01-18 PROBLEM — I63.9 CVA (CEREBRAL VASCULAR ACCIDENT) (MULTI): Status: ACTIVE | Noted: 2023-01-18

## 2023-01-18 PROBLEM — E66.01 CLASS 2 SEVERE OBESITY WITH SERIOUS COMORBIDITY AND BODY MASS INDEX (BMI) OF 36.0 TO 36.9 IN ADULT (MULTI): Status: ACTIVE | Noted: 2023-01-18

## 2023-01-18 PROBLEM — H91.90 HEARING LOSS: Status: ACTIVE | Noted: 2023-01-18

## 2023-01-18 PROBLEM — G62.9 NEUROPATHY: Status: ACTIVE | Noted: 2023-01-18

## 2023-01-18 PROBLEM — G89.29 CHRONIC PAIN OF TOE OF RIGHT FOOT: Status: ACTIVE | Noted: 2023-01-18

## 2023-01-18 PROBLEM — G47.34 NOCTURNAL HYPOXIA: Status: ACTIVE | Noted: 2023-01-18

## 2023-01-18 PROBLEM — E11.00: Status: ACTIVE | Noted: 2023-01-18

## 2023-01-18 PROBLEM — I10 HYPERTENSION: Status: ACTIVE | Noted: 2023-01-18

## 2023-01-18 PROBLEM — L30.9 ECZEMA: Status: ACTIVE | Noted: 2023-01-18

## 2023-01-18 PROBLEM — N28.1 KIDNEY CYSTS: Status: ACTIVE | Noted: 2023-01-18

## 2023-01-18 PROBLEM — M79.675 CHRONIC PAIN OF TOE OF LEFT FOOT: Status: ACTIVE | Noted: 2023-01-18

## 2023-01-18 RX ORDER — SPIRONOLACTONE 25 MG/1
25 TABLET ORAL DAILY
COMMUNITY

## 2023-01-18 RX ORDER — INSULIN GLARGINE 100 [IU]/ML
40 INJECTION, SOLUTION SUBCUTANEOUS
COMMUNITY

## 2023-01-18 RX ORDER — ISOPROPYL ALCOHOL 70 ML/100ML
SWAB TOPICAL
COMMUNITY
End: 2023-03-14 | Stop reason: SDUPTHER

## 2023-01-18 RX ORDER — BLOOD SUGAR DIAGNOSTIC
STRIP MISCELLANEOUS
COMMUNITY
End: 2023-04-03 | Stop reason: SDUPTHER

## 2023-01-18 RX ORDER — TRIAMCINOLONE ACETONIDE 1 MG/G
CREAM TOPICAL
COMMUNITY

## 2023-01-18 RX ORDER — PEN NEEDLE, DIABETIC 29 G X1/2"
NEEDLE, DISPOSABLE MISCELLANEOUS
COMMUNITY
End: 2024-03-27 | Stop reason: SDUPTHER

## 2023-01-18 RX ORDER — ATORVASTATIN CALCIUM 10 MG/1
10 TABLET, FILM COATED ORAL DAILY
COMMUNITY
End: 2023-03-06 | Stop reason: DRUGHIGH

## 2023-01-18 RX ORDER — LANCETS 33 GAUGE
EACH MISCELLANEOUS
COMMUNITY
End: 2023-05-15 | Stop reason: SDUPTHER

## 2023-01-18 RX ORDER — LANOLIN ALCOHOL/MO/W.PET/CERES
100 CREAM (GRAM) TOPICAL DAILY
COMMUNITY

## 2023-01-18 RX ORDER — NAPROXEN 500 MG/1
500 TABLET ORAL EVERY 12 HOURS PRN
COMMUNITY
End: 2023-06-06

## 2023-01-18 RX ORDER — INSULIN PUMP SYRINGE, 3 ML
1 EACH MISCELLANEOUS
COMMUNITY
End: 2023-04-03 | Stop reason: SDUPTHER

## 2023-01-18 RX ORDER — CHLORTHALIDONE 25 MG/1
25 TABLET ORAL DAILY
COMMUNITY
End: 2023-11-13 | Stop reason: SDUPTHER

## 2023-01-18 RX ORDER — TIZANIDINE 2 MG/1
2 TABLET ORAL EVERY 8 HOURS PRN
COMMUNITY
End: 2023-06-06

## 2023-01-18 RX ORDER — METOPROLOL TARTRATE 100 MG/1
100 TABLET ORAL
COMMUNITY
End: 2023-03-20 | Stop reason: SDUPTHER

## 2023-01-18 RX ORDER — ASPIRIN 81 MG/1
81 TABLET ORAL DAILY
COMMUNITY

## 2023-01-18 RX ORDER — ALBUTEROL SULFATE 90 UG/1
1-2 AEROSOL, METERED RESPIRATORY (INHALATION)
COMMUNITY

## 2023-01-18 RX ORDER — AMLODIPINE BESYLATE 10 MG/1
10 TABLET ORAL DAILY
COMMUNITY
End: 2023-06-06 | Stop reason: SDUPTHER

## 2023-01-18 RX ORDER — LISINOPRIL 40 MG/1
40 TABLET ORAL DAILY
COMMUNITY
End: 2023-09-11 | Stop reason: SDUPTHER

## 2023-01-18 RX ORDER — GABAPENTIN 100 MG/1
100 CAPSULE ORAL 3 TIMES DAILY
COMMUNITY
End: 2023-06-06

## 2023-02-19 PROBLEM — Z86.73 HISTORY OF STROKE: Status: ACTIVE | Noted: 2023-02-19

## 2023-03-04 PROBLEM — G89.29 CHRONIC PAIN OF TOE OF RIGHT FOOT: Status: RESOLVED | Noted: 2023-01-18 | Resolved: 2023-03-04

## 2023-03-04 PROBLEM — M79.674 CHRONIC PAIN OF TOE OF RIGHT FOOT: Status: RESOLVED | Noted: 2023-01-18 | Resolved: 2023-03-04

## 2023-03-04 PROBLEM — Z86.73 HISTORY OF STROKE: Status: RESOLVED | Noted: 2023-02-19 | Resolved: 2023-03-04

## 2023-03-04 PROBLEM — R06.83 SNORING: Status: RESOLVED | Noted: 2023-01-18 | Resolved: 2023-03-04

## 2023-03-05 PROBLEM — N18.31 STAGE 3A CHRONIC KIDNEY DISEASE (MULTI): Status: ACTIVE | Noted: 2023-03-05

## 2023-03-06 ENCOUNTER — OFFICE VISIT (OUTPATIENT)
Dept: PRIMARY CARE | Facility: CLINIC | Age: 63
End: 2023-03-06
Payer: COMMERCIAL

## 2023-03-06 VITALS
SYSTOLIC BLOOD PRESSURE: 115 MMHG | HEIGHT: 67 IN | HEART RATE: 57 BPM | BODY MASS INDEX: 35.31 KG/M2 | RESPIRATION RATE: 18 BRPM | OXYGEN SATURATION: 96 % | DIASTOLIC BLOOD PRESSURE: 70 MMHG | WEIGHT: 225 LBS

## 2023-03-06 DIAGNOSIS — N18.31 STAGE 3A CHRONIC KIDNEY DISEASE (MULTI): ICD-10-CM

## 2023-03-06 DIAGNOSIS — E66.01 CLASS 2 SEVERE OBESITY WITH SERIOUS COMORBIDITY AND BODY MASS INDEX (BMI) OF 35.0 TO 35.9 IN ADULT, UNSPECIFIED OBESITY TYPE (MULTI): ICD-10-CM

## 2023-03-06 DIAGNOSIS — Z79.4 TYPE 2 DIABETES MELLITUS WITHOUT COMPLICATION, WITH LONG-TERM CURRENT USE OF INSULIN (MULTI): ICD-10-CM

## 2023-03-06 DIAGNOSIS — E78.5 HYPERLIPIDEMIA, UNSPECIFIED HYPERLIPIDEMIA TYPE: ICD-10-CM

## 2023-03-06 DIAGNOSIS — E11.9 TYPE 2 DIABETES MELLITUS WITHOUT COMPLICATION, WITH LONG-TERM CURRENT USE OF INSULIN (MULTI): ICD-10-CM

## 2023-03-06 DIAGNOSIS — Z87.891 PERSONAL HISTORY OF NICOTINE DEPENDENCE: ICD-10-CM

## 2023-03-06 DIAGNOSIS — I10 HYPERTENSION, UNSPECIFIED TYPE: ICD-10-CM

## 2023-03-06 DIAGNOSIS — Z12.5 PROSTATE CANCER SCREENING: ICD-10-CM

## 2023-03-06 DIAGNOSIS — Z00.00 ROUTINE HEALTH MAINTENANCE: Primary | ICD-10-CM

## 2023-03-06 DIAGNOSIS — I63.9 CEREBROVASCULAR ACCIDENT (CVA), UNSPECIFIED MECHANISM (MULTI): ICD-10-CM

## 2023-03-06 PROBLEM — E11.00: Status: RESOLVED | Noted: 2023-01-18 | Resolved: 2023-03-06

## 2023-03-06 LAB — POC HEMOGLOBIN A1C: 6.4 % (ref 4.2–6.5)

## 2023-03-06 PROCEDURE — 3074F SYST BP LT 130 MM HG: CPT | Performed by: FAMILY MEDICINE

## 2023-03-06 PROCEDURE — 99214 OFFICE O/P EST MOD 30 MIN: CPT | Performed by: FAMILY MEDICINE

## 2023-03-06 PROCEDURE — 83036 HEMOGLOBIN GLYCOSYLATED A1C: CPT | Performed by: FAMILY MEDICINE

## 2023-03-06 PROCEDURE — 3078F DIAST BP <80 MM HG: CPT | Performed by: FAMILY MEDICINE

## 2023-03-06 PROCEDURE — G0439 PPPS, SUBSEQ VISIT: HCPCS | Performed by: FAMILY MEDICINE

## 2023-03-06 PROCEDURE — 4010F ACE/ARB THERAPY RXD/TAKEN: CPT | Performed by: FAMILY MEDICINE

## 2023-03-06 PROCEDURE — 3008F BODY MASS INDEX DOCD: CPT | Performed by: FAMILY MEDICINE

## 2023-03-06 RX ORDER — ATORVASTATIN CALCIUM 40 MG/1
40 TABLET, FILM COATED ORAL DAILY
Qty: 30 TABLET | Refills: 1 | Status: SHIPPED | OUTPATIENT
Start: 2023-03-06 | End: 2023-10-23 | Stop reason: SDUPTHER

## 2023-03-06 ASSESSMENT — ENCOUNTER SYMPTOMS
WHEEZING: 0
COUGH: 0
HYPERTENSION: 1
DIZZINESS: 0
NUMBNESS: 0
VISUAL CHANGE: 0
SHORTNESS OF BREATH: 0
LIGHT-HEADEDNESS: 0
FEVER: 0

## 2023-03-06 ASSESSMENT — PATIENT HEALTH QUESTIONNAIRE - PHQ9
2. FEELING DOWN, DEPRESSED OR HOPELESS: NOT AT ALL
1. LITTLE INTEREST OR PLEASURE IN DOING THINGS: NOT AT ALL
SUM OF ALL RESPONSES TO PHQ9 QUESTIONS 1 AND 2: 0

## 2023-03-06 NOTE — PROGRESS NOTES
Subjective   Reason for Visit: Diaz Manuel is an 62 y.o. male here for a Medicare Wellness visit.     Past Medical, Surgical, and Family History reviewed and updated in chart.    Reviewed all medications by prescribing practitioner or clinical pharmacist (such as prescriptions, OTCs, herbal therapies and supplements) and documented in the medical record.    Hypertension  This is a chronic problem. The problem is controlled. Pertinent negatives include no chest pain or shortness of breath.   Diabetes  He presents for his follow-up diabetic visit. He has type 2 diabetes mellitus. Pertinent negatives for hypoglycemia include no dizziness. Pertinent negatives for diabetes include no chest pain, no foot paresthesias and no visual change. His breakfast blood glucose range is generally 110-130 mg/dl. His lunch blood glucose range is generally 130-140 mg/dl. His bedtime blood glucose range is generally 110-130 mg/dl.       He is here today for his annual wellness exam and also 3-month follow-up  Diabetes mellitus: He is currently taking Lantus 40 units daily, as well as NovoLog sliding scale 3 times daily (2 units for every 50 glucose greater than 150).  He denies any hypoglycemia.  His average glucose out of the office has been in the 120s.   Hyperlipidemia: He is currently taking atorvastatin 40 mg daily and OTC omega-3 fish oils twice daily.  Last lipid panel done 4/13/2022 showed triglycerides 290, and LDL 18  He follows with nephrology annually for stage IIIa chronic kidney disease  Takes lisinopril, chlorthalidone, amlodipine, Toprol and spironolactone for hypertension.  No elevated BP readings out of office  He is a current smoker.  Has smoked 1 pack/day since age 13  Last colonoscopy was 2021.  Repeat 3 is recommended  Received Tdap vaccine in 2022.  Received PCV 20 vaccine in 2022        Patient Care Team:  José Luis Mesa DO as PCP - General     Review of Systems   Constitutional:  Negative for fever.   Respiratory:   "Negative for cough, shortness of breath and wheezing.    Cardiovascular:  Negative for chest pain and leg swelling.   Neurological:  Negative for dizziness, light-headedness and numbness.   All other systems reviewed and are negative.      Objective   Vitals:  /70   Pulse 57   Resp 18   Ht 1.702 m (5' 7\")   Wt 102 kg (225 lb)   SpO2 96%   BMI 35.24 kg/m²       Physical Exam  Vitals reviewed.   Constitutional:       General: He is not in acute distress.     Appearance: Normal appearance. He is well-developed.   HENT:      Head: Normocephalic.      Right Ear: Tympanic membrane, ear canal and external ear normal.      Left Ear: Tympanic membrane, ear canal and external ear normal.      Nose: Nose normal.      Mouth/Throat:      Mouth: Mucous membranes are moist.   Eyes:      Conjunctiva/sclera: Conjunctivae normal.   Neck:      Thyroid: No thyromegaly.      Vascular: No JVD.   Cardiovascular:      Rate and Rhythm: Normal rate and regular rhythm.      Heart sounds: Normal heart sounds.   Pulmonary:      Effort: Pulmonary effort is normal.      Breath sounds: Normal breath sounds.   Abdominal:      Palpations: Abdomen is soft.      Tenderness: There is no abdominal tenderness.   Musculoskeletal:         General: Normal range of motion.   Lymphadenopathy:      Cervical: No cervical adenopathy.   Skin:     Findings: No rash.   Neurological:      Mental Status: He is alert and oriented to person, place, and time.   Psychiatric:         Mood and Affect: Mood normal.         Behavior: Behavior normal.         Assessment/Plan   Problem List Items Addressed This Visit          Medium    Class 2 severe obesity with serious comorbidity and body mass index (BMI) of 35.0 to 35.9 in adult (CMS/[facility])    CVA (cerebral vascular accident) (CMS/[facility])    Overview     History of CVA 1/2018         Hyperlipidemia    Hypertension    Overview     No renal artery stenosis seen on ultrasound 10/2019. Normal aldosterone 10/2020         " Stage 3a chronic kidney disease    Overview     Follows with nephrology anually          Other Visit Diagnoses       Routine health maintenance    -  Primary    Type 2 diabetes mellitus without complication, with long-term current use of insulin (CMS/[facility])        Relevant Orders    POCT glycosylated hemoglobin (Hb A1C) manually resulted (Completed)    Basic Metabolic Panel    Personal history of nicotine dependence        Relevant Orders    CT lung screening low dose    Prostate cancer screening              #1 preventative health  Healthy diet and exercise discussed.  Dental health routinely recommended.  Up-to-date on tetanus and pneumonia vaccines.  Recommended shingles vaccine at pharmacy  He is going to be due for a screening PSA in April.  This is ordered today  Continue annual low-dose lung cancer screening.  He is going to be due for this next in May.  Tobacco cessation recommended  Up-to-date on screening colonoscopy  2.  diabetes mellitus:  His A1c today is at goal at 6.4%.  Continue Lantus and NovoLog and follow-up in 3 months for recheck  3.  Hypertension: Blood pressure is well controlled today.  Continue current medications and follow-up in 3 months for recheck  4.  Hyperlipidemia continue atorvastatin and fish oils.  Last LDL was at goal.  His triglycerides were elevated at 290.  Recommend regular exercise and healthy diet and we will check this again in 6 months  Continue to follow with nephrology annually for stage IIIa chronic kidney disease

## 2023-03-14 DIAGNOSIS — E11.00 TYPE 2 DIABETES MELLITUS WITH HYPEROSMOLARITY WITHOUT NONKETOTIC HYPERGLYCEMIC-HYPEROSMOLAR COMA (NKHHC) (MULTI): ICD-10-CM

## 2023-03-14 RX ORDER — ISOPROPYL ALCOHOL 70 ML/100ML
1 SWAB TOPICAL 3 TIMES DAILY
Qty: 300 EACH | Refills: 3 | Status: SHIPPED | OUTPATIENT
Start: 2023-03-14 | End: 2024-02-13 | Stop reason: SDUPTHER

## 2023-03-20 DIAGNOSIS — I10 HYPERTENSION, UNSPECIFIED TYPE: ICD-10-CM

## 2023-03-20 RX ORDER — METOPROLOL TARTRATE 100 MG/1
50 TABLET ORAL 2 TIMES DAILY
Qty: 90 TABLET | Refills: 3 | Status: SHIPPED | OUTPATIENT
Start: 2023-03-20 | End: 2023-03-21 | Stop reason: SDUPTHER

## 2023-03-21 DIAGNOSIS — I10 HYPERTENSION, UNSPECIFIED TYPE: ICD-10-CM

## 2023-03-21 RX ORDER — METOPROLOL TARTRATE 100 MG/1
50 TABLET ORAL 2 TIMES DAILY
Qty: 90 TABLET | Refills: 3 | Status: SHIPPED | OUTPATIENT
Start: 2023-03-21 | End: 2024-03-28 | Stop reason: SDUPTHER

## 2023-03-21 NOTE — TELEPHONE ENCOUNTER
Rx Refill Request Telephone Encounter    Name:  Diaz Manuel  :  [id number]  Medication Name:  metoprolol tartrate (Lopressor) 100 mg tablet             Specific Pharmacy location:  [facility]   Date of last appointment:     Date of next appointment:     Best number to reach patient:  [phone]

## 2023-04-03 DIAGNOSIS — E11.9 TYPE 2 DIABETES MELLITUS WITHOUT COMPLICATION, WITH LONG-TERM CURRENT USE OF INSULIN (MULTI): ICD-10-CM

## 2023-04-03 DIAGNOSIS — Z79.4 TYPE 2 DIABETES MELLITUS WITHOUT COMPLICATION, WITH LONG-TERM CURRENT USE OF INSULIN (MULTI): ICD-10-CM

## 2023-04-03 RX ORDER — INSULIN PUMP SYRINGE, 3 ML
1 EACH MISCELLANEOUS 3 TIMES DAILY
Qty: 1 EACH | Refills: 0 | Status: SHIPPED | OUTPATIENT
Start: 2023-04-03 | End: 2023-09-20 | Stop reason: SDUPTHER

## 2023-04-03 RX ORDER — BLOOD SUGAR DIAGNOSTIC
STRIP MISCELLANEOUS
Qty: 300 STRIP | Refills: 3 | Status: SHIPPED | OUTPATIENT
Start: 2023-04-03 | End: 2024-01-26 | Stop reason: SDUPTHER

## 2023-04-03 RX ORDER — INSULIN PUMP SYRINGE, 3 ML
1 EACH MISCELLANEOUS AS NEEDED
COMMUNITY
End: 2023-04-03 | Stop reason: SDUPTHER

## 2023-04-17 ENCOUNTER — TELEPHONE (OUTPATIENT)
Dept: PRIMARY CARE | Facility: CLINIC | Age: 63
End: 2023-04-17
Payer: COMMERCIAL

## 2023-04-17 NOTE — TELEPHONE ENCOUNTER
Pt has a cyst on right leg and unsure of who to follow up with. He is established with dermatology. Would you like for him to schedule with you or derm?

## 2023-04-19 ENCOUNTER — OFFICE VISIT (OUTPATIENT)
Dept: PRIMARY CARE | Facility: CLINIC | Age: 63
End: 2023-04-19
Payer: COMMERCIAL

## 2023-04-19 VITALS
WEIGHT: 226 LBS | RESPIRATION RATE: 18 BRPM | HEART RATE: 65 BPM | DIASTOLIC BLOOD PRESSURE: 75 MMHG | OXYGEN SATURATION: 96 % | BODY MASS INDEX: 35.4 KG/M2 | SYSTOLIC BLOOD PRESSURE: 122 MMHG

## 2023-04-19 DIAGNOSIS — R22.9 MASS OF SKIN: Primary | ICD-10-CM

## 2023-04-19 PROCEDURE — 3074F SYST BP LT 130 MM HG: CPT | Performed by: FAMILY MEDICINE

## 2023-04-19 PROCEDURE — 3008F BODY MASS INDEX DOCD: CPT | Performed by: FAMILY MEDICINE

## 2023-04-19 PROCEDURE — 99213 OFFICE O/P EST LOW 20 MIN: CPT | Performed by: FAMILY MEDICINE

## 2023-04-19 PROCEDURE — 3078F DIAST BP <80 MM HG: CPT | Performed by: FAMILY MEDICINE

## 2023-04-19 ASSESSMENT — ENCOUNTER SYMPTOMS
COUGH: 0
FEVER: 0

## 2023-04-19 NOTE — PROGRESS NOTES
Subjective   Patient ID: Diaz Manuel is a 62 y.o. male who presents for Cyst (On right leg, no pain, redness, or swelling/Seen by derm and would like for referral for specialist. ).    HPI   He has had a mass on his right upper shin for approximately 1 year.  There is no trauma prior to onset.  No change in size.  No pain.  He had discussed this lesion with his dermatologist and they had recommended seeing PCP to discuss further      Review of Systems   Constitutional:  Negative for fever.   Respiratory:  Negative for cough.        Objective   /75   Pulse 65   Resp 18   Wt 103 kg (226 lb)   SpO2 96%   BMI 35.40 kg/m²     Physical Exam  Constitutional:       General: He is not in acute distress.     Appearance: Normal appearance. He is well-developed.   Pulmonary:      Effort: Pulmonary effort is normal.   Skin:     Findings: Lesion present. No rash.      Comments: There is a firm, nontender, nonerythematous mass located anterior aspect of right upper shin, just distal to the knee joints.  There is no tenderness.  It is difficult to determine if the mass is bony or fixed to the tibia.  It measures approximately 1.5 cm in diameter   Neurological:      Mental Status: He is alert.   Psychiatric:         Mood and Affect: Mood normal.         Behavior: Behavior normal.         Assessment/Plan   Problem List Items Addressed This Visit    None  Visit Diagnoses       Mass of skin    -  Primary    Relevant Orders    XR tibia fibula right 2 views        He presents today with a painless mass in the area of his right upper anterior shin which has been present for 1 year.  On exam today it is difficult to determine if this is bony and attached to the tibia.  We will obtain x-ray of the tibia to further evaluate this lesion.  If the lesion is bony, consider referral to orthopedics.  Otherwise, we will consider a referral to general surgery.  Plan on following up by phone in the next several days once I have x-ray  results.

## 2023-04-21 ENCOUNTER — TELEPHONE (OUTPATIENT)
Dept: PRIMARY CARE | Facility: CLINIC | Age: 63
End: 2023-04-21
Payer: COMMERCIAL

## 2023-04-21 DIAGNOSIS — R22.9 MASS OF SKIN: Primary | ICD-10-CM

## 2023-04-21 NOTE — TELEPHONE ENCOUNTER
Pt has a new referral for general surgery to discuss mass on shin. Can you please call to schedule? Thanks!

## 2023-04-25 ENCOUNTER — TELEPHONE (OUTPATIENT)
Dept: CARE COORDINATION | Facility: CLINIC | Age: 63
End: 2023-04-25
Payer: COMMERCIAL

## 2023-05-15 DIAGNOSIS — Z79.4 TYPE 2 DIABETES MELLITUS WITHOUT COMPLICATION, WITH LONG-TERM CURRENT USE OF INSULIN (MULTI): ICD-10-CM

## 2023-05-15 DIAGNOSIS — E11.9 TYPE 2 DIABETES MELLITUS WITHOUT COMPLICATION, WITH LONG-TERM CURRENT USE OF INSULIN (MULTI): ICD-10-CM

## 2023-05-15 RX ORDER — LANCETS 33 GAUGE
EACH MISCELLANEOUS
Qty: 300 EACH | Refills: 3 | Status: SHIPPED | OUTPATIENT
Start: 2023-05-15 | End: 2024-01-25 | Stop reason: SDUPTHER

## 2023-06-06 ENCOUNTER — OFFICE VISIT (OUTPATIENT)
Dept: PRIMARY CARE | Facility: CLINIC | Age: 63
End: 2023-06-06
Payer: COMMERCIAL

## 2023-06-06 VITALS
HEART RATE: 54 BPM | HEIGHT: 67 IN | WEIGHT: 226 LBS | RESPIRATION RATE: 18 BRPM | SYSTOLIC BLOOD PRESSURE: 121 MMHG | OXYGEN SATURATION: 96 % | DIASTOLIC BLOOD PRESSURE: 74 MMHG | BODY MASS INDEX: 35.47 KG/M2

## 2023-06-06 DIAGNOSIS — Z12.5 SCREENING FOR PROSTATE CANCER: ICD-10-CM

## 2023-06-06 DIAGNOSIS — E78.5 HYPERLIPIDEMIA, UNSPECIFIED HYPERLIPIDEMIA TYPE: ICD-10-CM

## 2023-06-06 DIAGNOSIS — Z79.4 TYPE 2 DIABETES MELLITUS WITHOUT COMPLICATION, WITH LONG-TERM CURRENT USE OF INSULIN (MULTI): Primary | ICD-10-CM

## 2023-06-06 DIAGNOSIS — E11.9 TYPE 2 DIABETES MELLITUS WITHOUT COMPLICATION, WITH LONG-TERM CURRENT USE OF INSULIN (MULTI): ICD-10-CM

## 2023-06-06 DIAGNOSIS — I10 HYPERTENSION, UNSPECIFIED TYPE: ICD-10-CM

## 2023-06-06 DIAGNOSIS — Z79.4 TYPE 2 DIABETES MELLITUS WITHOUT COMPLICATION, WITH LONG-TERM CURRENT USE OF INSULIN (MULTI): ICD-10-CM

## 2023-06-06 DIAGNOSIS — E11.9 TYPE 2 DIABETES MELLITUS WITHOUT COMPLICATION, WITH LONG-TERM CURRENT USE OF INSULIN (MULTI): Primary | ICD-10-CM

## 2023-06-06 LAB — POC HEMOGLOBIN A1C: 6.3 % (ref 4.2–6.5)

## 2023-06-06 PROCEDURE — 3008F BODY MASS INDEX DOCD: CPT | Performed by: FAMILY MEDICINE

## 2023-06-06 PROCEDURE — 83036 HEMOGLOBIN GLYCOSYLATED A1C: CPT | Performed by: FAMILY MEDICINE

## 2023-06-06 PROCEDURE — 3078F DIAST BP <80 MM HG: CPT | Performed by: FAMILY MEDICINE

## 2023-06-06 PROCEDURE — 4010F ACE/ARB THERAPY RXD/TAKEN: CPT | Performed by: FAMILY MEDICINE

## 2023-06-06 PROCEDURE — 3074F SYST BP LT 130 MM HG: CPT | Performed by: FAMILY MEDICINE

## 2023-06-06 PROCEDURE — 99214 OFFICE O/P EST MOD 30 MIN: CPT | Performed by: FAMILY MEDICINE

## 2023-06-06 RX ORDER — FLUOROURACIL 50 MG/G
CREAM TOPICAL
COMMUNITY
Start: 2023-04-17

## 2023-06-06 RX ORDER — AMLODIPINE BESYLATE 10 MG/1
10 TABLET ORAL DAILY
Qty: 90 TABLET | Refills: 3 | Status: SHIPPED | OUTPATIENT
Start: 2023-06-06 | End: 2023-09-18 | Stop reason: SDUPTHER

## 2023-06-06 RX ORDER — INSULIN ASPART 100 [IU]/ML
INJECTION, SOLUTION INTRAVENOUS; SUBCUTANEOUS
Qty: 15 ML | Refills: 3 | Status: SHIPPED | OUTPATIENT
Start: 2023-06-06 | End: 2023-08-03 | Stop reason: SDUPTHER

## 2023-06-06 ASSESSMENT — ENCOUNTER SYMPTOMS
FEVER: 0
SHORTNESS OF BREATH: 0
VISUAL CHANGE: 0
HYPERTENSION: 1
NUMBNESS: 0
COUGH: 0
DIZZINESS: 0

## 2023-06-06 NOTE — PROGRESS NOTES
"Subjective   Patient ID: Diaz Manuel is a 62 y.o. male who presents for Diabetes and Hypertension.    Hypertension  This is a chronic problem. Pertinent negatives include no chest pain or shortness of breath. There are no compliance problems.    Diabetes  He presents for his follow-up diabetic visit. He has type 2 diabetes mellitus. Pertinent negatives for hypoglycemia include no dizziness. Pertinent negatives for diabetes include no chest pain, no foot paresthesias and no visual change. His overall blood glucose range is 110-130 mg/dl.        He is here today for 3-month follow-up  Diabetes mellitus: Currently taking Lantus 40 units daily.  He has a prescription for NovoLog sliding scale 3 times daily (takes 2 units for every 50 glucose greater than 150), but he states he has not had to use it recently.  Monitors glucose out of the office and average has been 130.  Denies any blurred vision or lower extremity paresthesias.  He has not had a dilated eye exam in the last year.  He reports that he has been doing well with his diet and has been working on staying active  Hyperlipidemia: Currently takes atorvastatin 40 mg daily and OTC omega-3 fish oils.  Last lipid panel done 12/13/2022 showed total cholesterol 97, LDL 18 and triglycerides 290  Hypertension: Currently taking lisinopril 40 mg daily, metoprolol 50 mg twice daily, chlorthalidone 25 mg daily, amlodipine 10 mg daily and spironolactone 25 mg daily.  Denies any dizziness or elevated blood pressure readings out of the office  Follows with nephrology annually for stage IIIa chronic kidney disease        Review of Systems   Constitutional:  Negative for fever.   Respiratory:  Negative for cough and shortness of breath.    Cardiovascular:  Negative for chest pain and leg swelling.   Neurological:  Negative for dizziness and numbness.       Objective   /74   Pulse 54   Resp 18   Ht 1.702 m (5' 7\")   Wt 103 kg (226 lb)   SpO2 96%   BMI 35.40 kg/m² "     Physical Exam  Vitals reviewed.   Constitutional:       General: He is not in acute distress.     Appearance: Normal appearance. He is well-developed.   HENT:      Head: Normocephalic.   Eyes:      Conjunctiva/sclera: Conjunctivae normal.   Cardiovascular:      Rate and Rhythm: Normal rate and regular rhythm.      Heart sounds: Normal heart sounds.   Pulmonary:      Effort: Pulmonary effort is normal.      Breath sounds: Normal breath sounds.   Musculoskeletal:      Right lower leg: No edema.      Left lower leg: No edema.   Skin:     Findings: No rash.   Neurological:      Mental Status: He is alert.   Psychiatric:         Mood and Affect: Mood normal.         Behavior: Behavior normal.         Assessment/Plan   Problem List Items Addressed This Visit          Medium    Hypertension    Relevant Medications    amLODIPine (Norvasc) 10 mg tablet     Other Visit Diagnoses       Screening for prostate cancer    -  Primary    Relevant Orders    Prostate Specific Antigen, Screen    Type 2 diabetes mellitus without complication, with long-term current use of insulin (CMS/[facility])        Relevant Orders    POCT glycosylated hemoglobin (Hb A1C) manually resulted (Completed)    Basic Metabolic Panel    Lipid Panel    CBC    TSH with reflex to Free T4 if abnormal        #1 diabetes mellitus: A1c today is at goal at 6.3%.  We will continue Lantus and NovoLog sliding scale and follow-up in 4 months to recheck  2.  Hypertension: Blood pressure is at goal, continue current medications  3.  Hyperlipidemia: Last lipid panel showed that LDL and total cholesterol are both at goal.  His triglyceride level was elevated at 290.  Continue atorvastatin and OTC omega-3 fish oils.  Discussed continuing regular exercise and avoiding excess concentrated sugars and saturated fats in diet.  We will recheck lipid panel with next labs  Follow-up in 4 months for recheck

## 2023-06-16 ENCOUNTER — LAB (OUTPATIENT)
Dept: LAB | Facility: LAB | Age: 63
End: 2023-06-16
Payer: COMMERCIAL

## 2023-06-16 DIAGNOSIS — Z12.5 SCREENING FOR PROSTATE CANCER: ICD-10-CM

## 2023-06-16 DIAGNOSIS — Z79.4 TYPE 2 DIABETES MELLITUS WITHOUT COMPLICATION, WITH LONG-TERM CURRENT USE OF INSULIN (MULTI): ICD-10-CM

## 2023-06-16 DIAGNOSIS — E11.9 TYPE 2 DIABETES MELLITUS WITHOUT COMPLICATION, WITH LONG-TERM CURRENT USE OF INSULIN (MULTI): ICD-10-CM

## 2023-06-16 LAB
ANION GAP IN SER/PLAS: 15 MMOL/L (ref 10–20)
CALCIUM (MG/DL) IN SER/PLAS: 9 MG/DL (ref 8.6–10.3)
CARBON DIOXIDE, TOTAL (MMOL/L) IN SER/PLAS: 25 MMOL/L (ref 21–32)
CHLORIDE (MMOL/L) IN SER/PLAS: 103 MMOL/L (ref 98–107)
CHOLESTEROL (MG/DL) IN SER/PLAS: 107 MG/DL (ref 0–199)
CHOLESTEROL IN HDL (MG/DL) IN SER/PLAS: 21.9 MG/DL
CHOLESTEROL/HDL RATIO: 4.9
CREATININE (MG/DL) IN SER/PLAS: 1.64 MG/DL (ref 0.5–1.3)
ERYTHROCYTE DISTRIBUTION WIDTH (RATIO) BY AUTOMATED COUNT: 13.6 % (ref 11.5–14.5)
ERYTHROCYTE MEAN CORPUSCULAR HEMOGLOBIN CONCENTRATION (G/DL) BY AUTOMATED: 32 G/DL (ref 32–36)
ERYTHROCYTE MEAN CORPUSCULAR VOLUME (FL) BY AUTOMATED COUNT: 96 FL (ref 80–100)
ERYTHROCYTES (10*6/UL) IN BLOOD BY AUTOMATED COUNT: 4.31 X10E12/L (ref 4.5–5.9)
GFR MALE: 47 ML/MIN/1.73M2
GLUCOSE (MG/DL) IN SER/PLAS: 128 MG/DL (ref 74–99)
HEMATOCRIT (%) IN BLOOD BY AUTOMATED COUNT: 41.2 % (ref 41–52)
HEMOGLOBIN (G/DL) IN BLOOD: 13.2 G/DL (ref 13.5–17.5)
LDL: 23 MG/DL (ref 0–99)
LEUKOCYTES (10*3/UL) IN BLOOD BY AUTOMATED COUNT: 7.8 X10E9/L (ref 4.4–11.3)
NON HDL CHOLESTEROL: 85 MG/DL
PLATELETS (10*3/UL) IN BLOOD AUTOMATED COUNT: 165 X10E9/L (ref 150–450)
POTASSIUM (MMOL/L) IN SER/PLAS: 4.5 MMOL/L (ref 3.5–5.3)
PROSTATE SPECIFIC ANTIGEN,SCREEN: 1.82 NG/ML (ref 0–4)
SODIUM (MMOL/L) IN SER/PLAS: 138 MMOL/L (ref 136–145)
THYROTROPIN (MIU/L) IN SER/PLAS BY DETECTION LIMIT <= 0.05 MIU/L: 0.76 MIU/L (ref 0.44–3.98)
TRIGLYCERIDE (MG/DL) IN SER/PLAS: 313 MG/DL (ref 0–149)
UREA NITROGEN (MG/DL) IN SER/PLAS: 30 MG/DL (ref 6–23)
VLDL: 63 MG/DL (ref 0–40)

## 2023-06-16 PROCEDURE — G0103 PSA SCREENING: HCPCS

## 2023-06-16 PROCEDURE — 84443 ASSAY THYROID STIM HORMONE: CPT

## 2023-06-16 PROCEDURE — 80061 LIPID PANEL: CPT

## 2023-06-16 PROCEDURE — 80048 BASIC METABOLIC PNL TOTAL CA: CPT

## 2023-06-16 PROCEDURE — 85027 COMPLETE CBC AUTOMATED: CPT

## 2023-06-16 PROCEDURE — 36415 COLL VENOUS BLD VENIPUNCTURE: CPT

## 2023-07-05 ENCOUNTER — TELEPHONE (OUTPATIENT)
Dept: PRIMARY CARE | Facility: CLINIC | Age: 63
End: 2023-07-05
Payer: COMMERCIAL

## 2023-08-03 DIAGNOSIS — E11.9 TYPE 2 DIABETES MELLITUS WITHOUT COMPLICATION, WITH LONG-TERM CURRENT USE OF INSULIN (MULTI): ICD-10-CM

## 2023-08-03 DIAGNOSIS — Z79.4 TYPE 2 DIABETES MELLITUS WITHOUT COMPLICATION, WITH LONG-TERM CURRENT USE OF INSULIN (MULTI): ICD-10-CM

## 2023-08-03 RX ORDER — INSULIN ASPART 100 [IU]/ML
INJECTION, SOLUTION INTRAVENOUS; SUBCUTANEOUS
Qty: 15 ML | Refills: 3 | Status: SHIPPED | OUTPATIENT
Start: 2023-08-03 | End: 2023-08-08 | Stop reason: ALTCHOICE

## 2023-08-03 NOTE — TELEPHONE ENCOUNTER
Rx Refill Request Telephone Encounter    Name:  iDaz Manuel  :  [id number]  Medication Name:  insulin aspart (NovoLOG FlexPen U-100 Insulin) 100 unit/mL (3 mL) pen             Specific Pharmacy location:  [facility]  Date of last appointment:  n/a  Date of next appointment:  n/a  Best number to reach patient:  [phone]

## 2023-08-08 DIAGNOSIS — Z79.4 TYPE 2 DIABETES MELLITUS WITHOUT COMPLICATION, WITH LONG-TERM CURRENT USE OF INSULIN (MULTI): ICD-10-CM

## 2023-08-08 DIAGNOSIS — E11.9 TYPE 2 DIABETES MELLITUS WITHOUT COMPLICATION, WITH LONG-TERM CURRENT USE OF INSULIN (MULTI): Primary | ICD-10-CM

## 2023-08-08 DIAGNOSIS — Z79.4 TYPE 2 DIABETES MELLITUS WITHOUT COMPLICATION, WITH LONG-TERM CURRENT USE OF INSULIN (MULTI): Primary | ICD-10-CM

## 2023-08-08 DIAGNOSIS — E11.9 TYPE 2 DIABETES MELLITUS WITHOUT COMPLICATION, WITH LONG-TERM CURRENT USE OF INSULIN (MULTI): ICD-10-CM

## 2023-08-08 RX ORDER — INSULIN LISPRO 100 [IU]/ML
6-12 INJECTION, SUSPENSION SUBCUTANEOUS SEE ADMIN INSTRUCTIONS
COMMUNITY

## 2023-08-08 RX ORDER — INSULIN LISPRO 100 [IU]/ML
6-12 INJECTION, SOLUTION INTRAVENOUS; SUBCUTANEOUS
Qty: 15 ML | Refills: 3 | Status: SHIPPED | OUTPATIENT
Start: 2023-08-08 | End: 2023-09-21 | Stop reason: SDUPTHER

## 2023-08-08 RX ORDER — INSULIN LISPRO 100 [IU]/ML
6-12 INJECTION, SUSPENSION SUBCUTANEOUS SEE ADMIN INSTRUCTIONS
Qty: 15 ML | Refills: 3 | OUTPATIENT
Start: 2023-08-08

## 2023-08-08 NOTE — TELEPHONE ENCOUNTER
Novolog Flexpen is no longer covered under pts plan, the formulary alternatives are Humalog and Insulin Lispro.

## 2023-09-11 DIAGNOSIS — I10 HYPERTENSION, UNSPECIFIED TYPE: Primary | ICD-10-CM

## 2023-09-11 RX ORDER — LISINOPRIL 40 MG/1
40 TABLET ORAL DAILY
Qty: 90 TABLET | Refills: 3 | Status: SHIPPED | OUTPATIENT
Start: 2023-09-11 | End: 2023-09-13 | Stop reason: SDUPTHER

## 2023-09-11 NOTE — TELEPHONE ENCOUNTER
Rx Refill Request Telephone Encounter    Name:  Diaz Manuel  :  [id number]  Medication Name:  lisinopril 40 mg tablet             Specific Pharmacy location:  [facility]  Date of last appointment:  na  Date of next appointment:  na  Best number to reach patient:  na

## 2023-09-11 NOTE — TELEPHONE ENCOUNTER
Rx Refill Request Telephone Encounter    Name:  Diaz DALLIN Manuel  :  [id number]  Medication Name: Lisinopril 40 mg

## 2023-09-13 ENCOUNTER — CLINICAL SUPPORT (OUTPATIENT)
Dept: PRIMARY CARE | Facility: CLINIC | Age: 63
End: 2023-09-13
Payer: COMMERCIAL

## 2023-09-13 DIAGNOSIS — Z23 NEED FOR INFLUENZA VACCINATION: ICD-10-CM

## 2023-09-13 DIAGNOSIS — I10 HYPERTENSION, UNSPECIFIED TYPE: ICD-10-CM

## 2023-09-13 PROCEDURE — 90686 IIV4 VACC NO PRSV 0.5 ML IM: CPT | Performed by: FAMILY MEDICINE

## 2023-09-13 PROCEDURE — G0008 ADMIN INFLUENZA VIRUS VAC: HCPCS | Performed by: FAMILY MEDICINE

## 2023-09-13 RX ORDER — LISINOPRIL 40 MG/1
40 TABLET ORAL DAILY
Qty: 90 TABLET | Refills: 3 | Status: SHIPPED | OUTPATIENT
Start: 2023-09-13 | End: 2023-09-25 | Stop reason: SDUPTHER

## 2023-09-13 NOTE — TELEPHONE ENCOUNTER
Rx Refill Request Telephone Encounter    Name:  Diaz Manuel  :  [id number]  Medication Name:  lisinopril 40 mg tablet     Specific Pharmacy location:  [facility]  Date of last appointment:  na  Date of next appointment:  na  Best number to reach patient:  [phone]

## 2023-09-18 DIAGNOSIS — I10 HYPERTENSION, UNSPECIFIED TYPE: ICD-10-CM

## 2023-09-18 RX ORDER — AMLODIPINE BESYLATE 10 MG/1
10 TABLET ORAL DAILY
Qty: 90 TABLET | Refills: 3 | Status: SHIPPED | OUTPATIENT
Start: 2023-09-18 | End: 2023-09-19 | Stop reason: SDUPTHER

## 2023-09-18 NOTE — TELEPHONE ENCOUNTER
Rx Refill Request Telephone Encounter    Name:  Diaz Manuel  :  [id number]  Medication Name:  Amlodipine 10mg            Specific Pharmacy location:  [facility]  Date of last appointment:  n/a  Date of next appointment:  n/a  Best number to reach patient:  [phone]

## 2023-09-19 DIAGNOSIS — I10 HYPERTENSION, UNSPECIFIED TYPE: ICD-10-CM

## 2023-09-19 RX ORDER — AMLODIPINE BESYLATE 10 MG/1
10 TABLET ORAL DAILY
Qty: 90 TABLET | Refills: 3 | Status: SHIPPED | OUTPATIENT
Start: 2023-09-19

## 2023-09-19 NOTE — TELEPHONE ENCOUNTER
Rx Refill Request Telephone Encounter    Name:  Diaz Manuel  :  [id number]  Medication Name:  amLODIPine (Norvasc) 10 mg tablet     Specific Pharmacy location:  [facility]  Date of last appointment:  na  Date of next appointment:  na  Best number to reach patient:  [phone]        **This was sent to a mail order service pt no longer wants to use. Please resend to pharmacy listed above. Thank you

## 2023-09-20 DIAGNOSIS — Z79.4 TYPE 2 DIABETES MELLITUS WITHOUT COMPLICATION, WITH LONG-TERM CURRENT USE OF INSULIN (MULTI): ICD-10-CM

## 2023-09-20 DIAGNOSIS — E11.9 TYPE 2 DIABETES MELLITUS WITHOUT COMPLICATION, WITH LONG-TERM CURRENT USE OF INSULIN (MULTI): ICD-10-CM

## 2023-09-20 RX ORDER — INSULIN PUMP SYRINGE, 3 ML
1 EACH MISCELLANEOUS 3 TIMES DAILY
Qty: 1 EACH | Refills: 0 | Status: SHIPPED | OUTPATIENT
Start: 2023-09-20

## 2023-09-20 NOTE — TELEPHONE ENCOUNTER
Rx Refill Request Telephone Encounter    Name:  Diaz Manuel  :  [id number]  Medication Name:  FreeStyle glucose monitoring kit     Specific Pharmacy location:  [facility]  Date of last appointment:  na  Date of next appointment:  na  Best number to reach patient:  [phone]    *Pt called; stated that his machine is not working and he needs a new one.

## 2023-09-21 DIAGNOSIS — Z79.4 TYPE 2 DIABETES MELLITUS WITHOUT COMPLICATION, WITH LONG-TERM CURRENT USE OF INSULIN (MULTI): ICD-10-CM

## 2023-09-21 DIAGNOSIS — E11.9 TYPE 2 DIABETES MELLITUS WITHOUT COMPLICATION, WITH LONG-TERM CURRENT USE OF INSULIN (MULTI): ICD-10-CM

## 2023-09-21 RX ORDER — INSULIN LISPRO 100 [IU]/ML
6-12 INJECTION, SOLUTION INTRAVENOUS; SUBCUTANEOUS
Qty: 15 ML | Refills: 3 | Status: SHIPPED | OUTPATIENT
Start: 2023-09-21

## 2023-09-21 NOTE — TELEPHONE ENCOUNTER
Rx Refill Request Telephone Encounter    Name:  Diaz Manuel  :  [id number]  Medication Name:  insulin lispro (HumaLOG) 100 unit/mL injection     Specific Pharmacy location:  [facility]  Date of last appointment:  na  Date of next appointment:  na  Best number to reach patient:  [phone]

## 2023-09-21 NOTE — TELEPHONE ENCOUNTER
How many units is he suppose to be taking? The prescription in chart says  6-12 units. Can we get an exact number or just send request as is?

## 2023-09-22 ENCOUNTER — TELEPHONE (OUTPATIENT)
Dept: PRIMARY CARE | Facility: CLINIC | Age: 63
End: 2023-09-22
Payer: COMMERCIAL

## 2023-09-22 NOTE — TELEPHONE ENCOUNTER
Pt called; needs new paper printed out for insulin dosing. Pt's sugar is 250; how many units does he need?

## 2023-09-25 DIAGNOSIS — I10 HYPERTENSION, UNSPECIFIED TYPE: ICD-10-CM

## 2023-09-25 RX ORDER — LISINOPRIL 40 MG/1
40 TABLET ORAL DAILY
Qty: 90 TABLET | Refills: 3 | Status: SHIPPED | OUTPATIENT
Start: 2023-09-25

## 2023-09-25 NOTE — TELEPHONE ENCOUNTER
Rx Refill Request Telephone Encounter    Name:  Diaz Manuel  :  [id number]  Medication Name:  lisinopril 40 mg tablets    Specific Pharmacy location:  [facility]  Date of last appointment:  na  Date of next appointment:  na  Best number to reach patient:  [phone]

## 2023-10-05 ENCOUNTER — OFFICE VISIT (OUTPATIENT)
Dept: PRIMARY CARE | Facility: CLINIC | Age: 63
End: 2023-10-05
Payer: COMMERCIAL

## 2023-10-05 VITALS
BODY MASS INDEX: 35.63 KG/M2 | HEART RATE: 58 BPM | RESPIRATION RATE: 14 BRPM | WEIGHT: 227 LBS | DIASTOLIC BLOOD PRESSURE: 75 MMHG | TEMPERATURE: 95.9 F | HEIGHT: 67 IN | SYSTOLIC BLOOD PRESSURE: 117 MMHG | OXYGEN SATURATION: 95 %

## 2023-10-05 DIAGNOSIS — E78.5 HYPERLIPIDEMIA, UNSPECIFIED HYPERLIPIDEMIA TYPE: Primary | ICD-10-CM

## 2023-10-05 DIAGNOSIS — Z79.4 TYPE 2 DIABETES MELLITUS WITHOUT COMPLICATION, WITH LONG-TERM CURRENT USE OF INSULIN (MULTI): ICD-10-CM

## 2023-10-05 DIAGNOSIS — E11.9 TYPE 2 DIABETES MELLITUS WITHOUT COMPLICATION, WITH LONG-TERM CURRENT USE OF INSULIN (MULTI): ICD-10-CM

## 2023-10-05 DIAGNOSIS — I10 HYPERTENSION, UNSPECIFIED TYPE: ICD-10-CM

## 2023-10-05 LAB — POC HEMOGLOBIN A1C: 6.3 % (ref 4.2–6.5)

## 2023-10-05 PROCEDURE — 99214 OFFICE O/P EST MOD 30 MIN: CPT | Performed by: FAMILY MEDICINE

## 2023-10-05 PROCEDURE — 83036 HEMOGLOBIN GLYCOSYLATED A1C: CPT | Performed by: FAMILY MEDICINE

## 2023-10-05 PROCEDURE — 3078F DIAST BP <80 MM HG: CPT | Performed by: FAMILY MEDICINE

## 2023-10-05 PROCEDURE — 3008F BODY MASS INDEX DOCD: CPT | Performed by: FAMILY MEDICINE

## 2023-10-05 PROCEDURE — 3074F SYST BP LT 130 MM HG: CPT | Performed by: FAMILY MEDICINE

## 2023-10-05 PROCEDURE — 4010F ACE/ARB THERAPY RXD/TAKEN: CPT | Performed by: FAMILY MEDICINE

## 2023-10-05 ASSESSMENT — ENCOUNTER SYMPTOMS
NUMBNESS: 0
HEADACHES: 0
WHEEZING: 0
COUGH: 0
SHORTNESS OF BREATH: 0
DIZZINESS: 0
FEVER: 0

## 2023-10-05 NOTE — PROGRESS NOTES
"Subjective   Patient ID: Diaz Manuel is a 63 y.o. male who presents for Diabetes.    HPI     He is here today for 4-month follow-up.  No concerns today  He has a history of diabetes mellitus currently taking Lantus 40 units daily as well as NovoLog sliding scale 3 times daily (administers 2 units for every 50 glucose greater than 150).  He checks his glucose daily out of the office.  It was 142 this morning, but average at home has been 110.  Denies any paresthesias in feet.  He feels that he has been doing very well with diet and exercise.  Has not had a dilated eye exam in the past year  Hypertension: Currently taking spironolactone 25 mg daily, lisinopril 40 mg daily, metoprolol 50 mg twice daily, amlodipine 10 mg daily chlorthalidone 25 mg daily.  Denies any elevated blood pressure readings out of the office.  Denies any dizziness or lightheadedness  He is currently taking atorvastatin 40 mg daily and omega-3 fish oils over-the-counter for hyperlipidemia.  Last lipid panel done 6/16 showed LDL of 23.  Triglycerides were 313  He follows with nephrology annually for stage IIIa chronic kidney disease.  Last GFR done 6/16/2023 was 47      Review of Systems   Constitutional:  Negative for fever.   Respiratory:  Negative for cough, shortness of breath and wheezing.    Cardiovascular:  Negative for chest pain and leg swelling.   Neurological:  Negative for dizziness, numbness and headaches.       Objective   /75 (BP Location: Left arm, Patient Position: Sitting)   Pulse 58   Temp 35.5 °C (95.9 °F)   Resp 14   Ht 1.702 m (5' 7\")   Wt 103 kg (227 lb)   SpO2 95%   BMI 35.55 kg/m²     Physical Exam  Vitals reviewed.   Constitutional:       General: He is not in acute distress.     Appearance: Normal appearance. He is well-developed.   HENT:      Head: Normocephalic.   Eyes:      Conjunctiva/sclera: Conjunctivae normal.   Cardiovascular:      Rate and Rhythm: Normal rate and regular rhythm.      Heart sounds: " Normal heart sounds.   Pulmonary:      Effort: Pulmonary effort is normal.      Breath sounds: Normal breath sounds.   Musculoskeletal:      Right lower leg: No edema.      Left lower leg: No edema.   Skin:     Findings: No rash.   Neurological:      Mental Status: He is alert.   Psychiatric:         Mood and Affect: Mood normal.         Behavior: Behavior normal.         Assessment/Plan   Problem List Items Addressed This Visit          Medium    Hyperlipidemia - Primary    Relevant Orders    POCT glycosylated hemoglobin (Hb A1C) manually resulted (Completed)    Hypertension    Type 2 diabetes mellitus without complication, with long-term current use of insulin (CMS/[facility])   #1 diabetes mellitus:  This is controlled with A1c of 6.3%.  Continue Lantus 40 units daily and NovoLog sliding scale 3 times daily and follow-up in 4 to 5 months for recheck and CPE  Hypertension: Blood pressure is at goal at 117/75.  Continue current medications and recheck at follow-up  Hyperlipidemia: Last LDL was at goal.  Triglycerides were elevated at 313.  Continue atorvastatin and omega-3 fish oils.  We discussed avoiding excess concentrated sugars and saturated fats in diet.  Plan on rechecking labs including lipid panel at follow-up

## 2023-10-09 ENCOUNTER — APPOINTMENT (OUTPATIENT)
Dept: PRIMARY CARE | Facility: CLINIC | Age: 63
End: 2023-10-09
Payer: COMMERCIAL

## 2023-10-19 ENCOUNTER — APPOINTMENT (OUTPATIENT)
Dept: RADIOLOGY | Facility: HOSPITAL | Age: 63
End: 2023-10-19
Payer: COMMERCIAL

## 2023-10-19 ENCOUNTER — HOSPITAL ENCOUNTER (EMERGENCY)
Facility: HOSPITAL | Age: 63
Discharge: HOME | End: 2023-10-19
Payer: COMMERCIAL

## 2023-10-19 VITALS
HEART RATE: 59 BPM | BODY MASS INDEX: 35.63 KG/M2 | SYSTOLIC BLOOD PRESSURE: 120 MMHG | HEIGHT: 67 IN | DIASTOLIC BLOOD PRESSURE: 64 MMHG | OXYGEN SATURATION: 96 % | WEIGHT: 227 LBS | TEMPERATURE: 97.5 F | RESPIRATION RATE: 16 BRPM

## 2023-10-19 DIAGNOSIS — M25.562 ACUTE PAIN OF LEFT KNEE: Primary | ICD-10-CM

## 2023-10-19 PROCEDURE — 73564 X-RAY EXAM KNEE 4 OR MORE: CPT | Mod: LT

## 2023-10-19 PROCEDURE — 99283 EMERGENCY DEPT VISIT LOW MDM: CPT | Mod: 25

## 2023-10-19 PROCEDURE — 73564 X-RAY EXAM KNEE 4 OR MORE: CPT | Mod: LEFT SIDE | Performed by: RADIOLOGY

## 2023-10-19 RX ORDER — ACETAMINOPHEN 325 MG/1
650 TABLET ORAL ONCE
Status: COMPLETED | OUTPATIENT
Start: 2023-10-19 | End: 2023-10-19

## 2023-10-19 RX ORDER — IBUPROFEN 600 MG/1
600 TABLET ORAL EVERY 6 HOURS PRN
Qty: 24 TABLET | Refills: 0 | Status: SHIPPED | OUTPATIENT
Start: 2023-10-19

## 2023-10-19 RX ADMIN — ACETAMINOPHEN 650 MG: 325 TABLET ORAL at 12:01

## 2023-10-19 ASSESSMENT — COLUMBIA-SUICIDE SEVERITY RATING SCALE - C-SSRS
6. HAVE YOU EVER DONE ANYTHING, STARTED TO DO ANYTHING, OR PREPARED TO DO ANYTHING TO END YOUR LIFE?: NO
1. IN THE PAST MONTH, HAVE YOU WISHED YOU WERE DEAD OR WISHED YOU COULD GO TO SLEEP AND NOT WAKE UP?: NO
2. HAVE YOU ACTUALLY HAD ANY THOUGHTS OF KILLING YOURSELF?: NO

## 2023-10-19 ASSESSMENT — PAIN SCALES - GENERAL
PAINLEVEL_OUTOF10: 10 - WORST POSSIBLE PAIN
PAINLEVEL_OUTOF10: 3

## 2023-10-19 ASSESSMENT — PAIN DESCRIPTION - PAIN TYPE: TYPE: ACUTE PAIN

## 2023-10-19 ASSESSMENT — PAIN - FUNCTIONAL ASSESSMENT: PAIN_FUNCTIONAL_ASSESSMENT: 0-10

## 2023-10-19 NOTE — ED PROVIDER NOTES
HPI   Chief Complaint   Patient presents with    Knee Pain     Pt presents to the ED with left knee pain , states that his nephew had a seizure and ran into him       63-year-old male presents emergency department, states that yesterday a family member accidentally fell, striking his left knee with their head.  Since then the patient's been having pain, some swelling.  Pain gets worse with ambulation or weightbearing.  No numbness tingling to the distal extremity.  No additional complaints or concerns.      History provided by:  Patient   used: No                        No data recorded                Patient History   No past medical history on file.  Past Surgical History:   Procedure Laterality Date    OTHER SURGICAL HISTORY  09/25/2019    No history of surgery     Family History   Problem Relation Name Age of Onset    Heart failure Mother      Other (black lung) Father       Social History     Tobacco Use    Smoking status: Every Day     Packs/day: 1     Types: Cigarettes    Smokeless tobacco: Never   Substance Use Topics    Alcohol use: Not on file    Drug use: Not on file       Physical Exam   ED Triage Vitals [10/19/23 1059]   Temp Heart Rate Resp BP   36.4 °C (97.5 °F) 59 16 120/64      SpO2 Temp src Heart Rate Source Patient Position   96 % -- -- --      BP Location FiO2 (%)     -- --       Physical Exam    Physical exam:  Gen.: Vitals noted. No distress. Afebrile.   Cardiac: Regular rate rhythm. No murmur.   Pulmonary: Equal breath sounds bilaterally. No adventitious breath sounds.   Abdomen: Soft, nontender, nonsurgical. Normoactive bowel sounds.   Back: Nontender throughout.   Lower extremity: There is no tenderness over the medial joint line. There is no tenderness over the lateral joint line. The extensor mechanism is intact. There is no obvious laxity. The remainder of the extremity, specifically, the tib-fib, ankle, and foot are nontender. Skin is intact. Is neurovascularly intact  distally. There is no evidence of an intra-articular infection. Compartments are soft to palpation. There is no suggestion of DVT.      ED Course & MDM   Diagnoses as of 10/23/23 0783   Acute pain of left knee   Labs Reviewed - No data to display     XR knee left 4+ views   Final Result   No evidence of acute fracture. Trace suprapatellar effusion. Mild   degenerative changes.        Possible small exostosis arising from the posterior distal femur.   Follow-up as clinically warranted.        MACRO:   None.        Signed by: Jacqueline Abdi 10/19/2023 2:20 PM   Dictation workstation:   [id number]             Medical Decision Making  X-ray imaging of the knee shows trace suprapatellar effusion, otherwise unremarkable.    Discussed results with the patient, patient did request ibuprofen for pain, declined crutches or Ace wrap.  We will follow-up with orthopedics, ice and elevate at home.    Procedure  Procedures     MICHOACANO Harley-CNP  10/19/23 1538       MICHOACANO Harley-KIARA  10/23/23 2889

## 2023-10-23 DIAGNOSIS — E78.5 HYPERLIPIDEMIA, UNSPECIFIED HYPERLIPIDEMIA TYPE: Primary | ICD-10-CM

## 2023-10-23 RX ORDER — ATORVASTATIN CALCIUM 40 MG/1
40 TABLET, FILM COATED ORAL DAILY
Qty: 90 TABLET | Refills: 3 | Status: SHIPPED | OUTPATIENT
Start: 2023-10-23 | End: 2024-01-03 | Stop reason: SDUPTHER

## 2023-10-23 NOTE — TELEPHONE ENCOUNTER
Rx Refill Request Telephone Encounter    Name:  Diaz DALLIN Manuel  :  [id number]  Medication Name: Atorvastatin 40 mg

## 2023-10-25 ENCOUNTER — APPOINTMENT (OUTPATIENT)
Dept: ORTHOPEDIC SURGERY | Facility: CLINIC | Age: 63
End: 2023-10-25
Payer: COMMERCIAL

## 2023-11-10 ENCOUNTER — OFFICE VISIT (OUTPATIENT)
Dept: ORTHOPEDIC SURGERY | Facility: CLINIC | Age: 63
End: 2023-11-10
Payer: COMMERCIAL

## 2023-11-10 VITALS — HEIGHT: 67 IN | WEIGHT: 227 LBS | BODY MASS INDEX: 35.63 KG/M2

## 2023-11-10 DIAGNOSIS — S82.142A TIBIAL PLATEAU FRACTURE, LEFT, CLOSED, INITIAL ENCOUNTER: Primary | ICD-10-CM

## 2023-11-10 DIAGNOSIS — M25.562 PATELLOFEMORAL ARTHRALGIA OF LEFT KNEE: ICD-10-CM

## 2023-11-10 PROCEDURE — L1812 KO ELASTIC W/JOINTS PRE OTS: HCPCS | Performed by: ORTHOPAEDIC SURGERY

## 2023-11-10 RX ORDER — DICLOFENAC SODIUM 10 MG/G
4 GEL TOPICAL 2 TIMES DAILY
Qty: 100 G | Refills: 0 | Status: SHIPPED | OUTPATIENT
Start: 2023-11-10

## 2023-11-10 ASSESSMENT — PATIENT HEALTH QUESTIONNAIRE - PHQ9
1. LITTLE INTEREST OR PLEASURE IN DOING THINGS: NOT AT ALL
SUM OF ALL RESPONSES TO PHQ9 QUESTIONS 1 AND 2: 0
2. FEELING DOWN, DEPRESSED OR HOPELESS: NOT AT ALL

## 2023-11-10 ASSESSMENT — PAIN SCALES - GENERAL: PAINLEVEL_OUTOF10: 8

## 2023-11-10 ASSESSMENT — PAIN - FUNCTIONAL ASSESSMENT: PAIN_FUNCTIONAL_ASSESSMENT: 0-10

## 2023-11-10 NOTE — PROGRESS NOTES
Chief Complaint   Patient presents with    Left Knee - New Patient Visit     1. Left knee pain/ strain   DOI- 18/18/23 (Nephew fell on leg) 3 weeks, 2 days out)  X-Rays @x [facility] 10/19/23       HPI  Patient presents today as an established patient with a new complaint of left knee pain. He states that he went to the ED following an incident where his nephew fell onto his knee during a seizure. Patient has had anterior knee pain ever since that is worse with stairs and rising to stand. Denies any numbness or tingling. Has been ambulating well. Taking Ibuprofen as needed.      Physical exam  General: Alert and oriented to place, person, and time.  No acute distress and breathing comfortably; pleasant and cooperative with the examination.  Extremity:  Left Knee:  Skin healthy and intact  No gross swelling or ecchymosis  No varus malalignment  No valgus malalignment   No effusion  ROM: 0 to 120 degrees   No pain with internal rotation of the hip  No tenderness to palpation over medial joint line  No tenderness to palpation over lateral joint line  Pain with patellar compression  No laxity to valgus stress  No laxity to varus stress  Negative Lachman´s test  Negative anterior drawer test  Negative posterior drawer test  Negative Gaetano´s test   Neurovascular exam normal distally    Diagnostics:  XR knee left 4+ views    Result Date: 10/19/2023  Interpreted By:  Jacqueline Abdi, STUDY: XR KNEE LEFT 4+ VIEWS;  10/19/2023 2:08 pm   INDICATION: Signs/Symptoms:L knee injury.   COMPARISON: None.   ACCESSION NUMBER(S): [id number]   ORDERING CLINICIAN: SUE COTTER   FINDINGS: Four views of the left knee obtained.   No acute fracture or osseous displacement. Knee joint spaces are preserved. Trace patellar spurring and suprapatellar effusion. Small smooth osseous projection along the posterior margin of the distal femur. Also linear osseous projection consistent with tug lesion along the posterior proximal tibia       No evidence  of acute fracture. Trace suprapatellar effusion. Mild degenerative changes.   Possible small exostosis arising from the posterior distal femur. Follow-up as clinically warranted.   MACRO: None.   Signed by: Jacqueline Abdi 10/19/2023 2:20 PM Dictation workstation:   [id number]    My interpretation as follows: Subtle minimally displaced tibial plateau fracture involving the lateral tibial plateau.       Assessment:  63-year-old male with nondisplaced tibial plateau fracture    Treatment plan:  The past and future treatment options of the condition were discussed with the patient.   Patient opted for topical Voltaren gel as needed for pain and free sport brace   Patient will continue icing and rest as needed   Recommend nonweightbearing to the left lower extremity.  Follow up in 6 weeks for recheck. If not better will consider advanced imaging to include MRI.  All of the patient's questions were answered.    Follow-up 6 weeks

## 2023-11-13 DIAGNOSIS — I10 HYPERTENSION, UNSPECIFIED TYPE: Primary | ICD-10-CM

## 2023-11-13 DIAGNOSIS — I10 HYPERTENSION, UNSPECIFIED TYPE: ICD-10-CM

## 2023-11-13 RX ORDER — CHLORTHALIDONE 25 MG/1
25 TABLET ORAL DAILY
Qty: 90 TABLET | Refills: 3 | Status: SHIPPED | OUTPATIENT
Start: 2023-11-13

## 2023-11-13 RX ORDER — CHLORTHALIDONE 25 MG/1
25 TABLET ORAL DAILY
Qty: 30 TABLET | Refills: 11 | Status: SHIPPED | OUTPATIENT
Start: 2023-11-13 | End: 2023-11-13 | Stop reason: SDUPTHER

## 2023-11-13 NOTE — TELEPHONE ENCOUNTER
Rx Refill Request Telephone Encounter    Name:  Diaz Manuel  :  [id number]  Medication Name:    chlorthalidone (Hygroton) 25 mg tablet             Specific Pharmacy location:  [facility]  Date of last appointment:  na  Date of next appointment:  na  Best number to reach patient:  na

## 2023-11-13 NOTE — PLAN OF CARE
Problem: IP BALANCE  Goal: LTG - patient will maintain standing balance to allow for completion of daily activities  Outcome: Ongoing  Please see OT evaluation and progress notes related to goal [[id number]] [[id number]],[[id number]],[UNKNOWN]

## 2023-12-12 ENCOUNTER — ANCILLARY PROCEDURE (OUTPATIENT)
Dept: RADIOLOGY | Facility: CLINIC | Age: 63
End: 2023-12-12
Payer: COMMERCIAL

## 2023-12-12 ENCOUNTER — OFFICE VISIT (OUTPATIENT)
Dept: ORTHOPEDIC SURGERY | Facility: CLINIC | Age: 63
End: 2023-12-12
Payer: COMMERCIAL

## 2023-12-12 DIAGNOSIS — S82.142A TIBIAL PLATEAU FRACTURE, LEFT, CLOSED, INITIAL ENCOUNTER: Primary | ICD-10-CM

## 2023-12-12 DIAGNOSIS — S82.142A TIBIAL PLATEAU FRACTURE, LEFT, CLOSED, INITIAL ENCOUNTER: ICD-10-CM

## 2023-12-12 PROCEDURE — 73562 X-RAY EXAM OF KNEE 3: CPT | Mod: LEFT SIDE | Performed by: INTERNAL MEDICINE

## 2023-12-12 PROCEDURE — 3008F BODY MASS INDEX DOCD: CPT | Performed by: INTERNAL MEDICINE

## 2023-12-12 PROCEDURE — 99024 POSTOP FOLLOW-UP VISIT: CPT | Performed by: INTERNAL MEDICINE

## 2023-12-12 PROCEDURE — 73562 X-RAY EXAM OF KNEE 3: CPT | Mod: LT

## 2023-12-12 PROCEDURE — 4010F ACE/ARB THERAPY RXD/TAKEN: CPT | Performed by: INTERNAL MEDICINE

## 2023-12-12 NOTE — PROGRESS NOTES
CC:   Chief Complaint   Patient presents with    Left Knee - Follow-up     nondisplaced tibial plateau fracture  Repeat xrays today   Dr. Edwards, III pt       HPI: Diaz is a 63 y.o. male presents today for follow-up for left knee pain secondary to tibial plateau fracture, patient of Dr. Cole's and is on my schedule due to scheduling error.  He states he still has some pain with no improvement.        Review of Systems   GENERAL: Negative for malaise, significant weight loss, fever  MUSCULOSKELETAL: See HPI  NEURO:  Negative for numbness / tingling     Past Medical History  No past medical history on file.    Medication review  Medication Documentation Review Audit       Reviewed by Aida Alexander MA (Medical Assistant) on 11/10/23 at 1113      Medication Order Taking? Sig Documenting Provider Last Dose Status   albuterol 90 mcg/actuation inhaler [id number] No Inhale 1-2 puffs. Inhale 1-2 puff by mouth 4 to 6 hours if needed Historical Provider, MD Taking Active   alcohol swabs pads, medicated [id number] No Apply 1 Pad topically in the morning and 1 Pad in the evening and 1 Pad before bedtime. José Luis Mesa,  Taking Active   amLODIPine (Norvasc) 10 mg tablet [id number]  Take 1 tablet (10 mg) by mouth once daily. For blood pressure José Luis Mesa, DO  Active   aspirin 81 mg EC tablet [id number] No Take 1 tablet (81 mg) by mouth once daily. Historical Provider, MD Taking Active   atorvastatin (Lipitor) 40 mg tablet [id number]  Take 1 tablet (40 mg) by mouth once daily. José Luis Mesa, DO  Active   chlorthalidone (Hygroton) 25 mg tablet [id number] No Take 1 tablet (25 mg) by mouth once daily. Historical Provider, MD Taking Active   cyanocobalamin (Vitamin B-12) 1,000 mcg tablet [id number] No Take 100 mcg by mouth in the morning. Historical Provider, MD Taking Active   fluorouracil (Efudex) 5 % cream [id number] No APPLY TOPICALLY TO THE SCALP TWICE DAILY FOR 21 DAYS Historical Provider, MD Taking Active   FreeStyle glucose monitoring  "kit [id number]  1 each 3 times a day. Use as directed José Luis Mesa DO  Active   ibuprofen 600 mg tablet [id number]  Take 1 tablet (600 mg) by mouth every 6 hours if needed for mild pain (1 - 3) or fever (temp greater than 38.0 C). Elana Flores, APRN-CNP  Active   insulin glargine (Lantus) 100 unit/mL (3 mL) pen [id number] No Inject 40 Units under the skin. Inject 40 units daily Historical Provider, MD Taking Active   insulin lispro (HumaLOG) 100 unit/mL injection [id number]  Inject 6-12 Units under the skin 3 times a day with meals. Inject TID per sliding scale instructions José Luis Mesa DO  Active   insulin lispro protamin-lispro (HumaLOG Mix 50-50 KwikPen) 100 unit/mL (50-50) injection [id number]  Inject 6-12 Units under the skin see administration instructions. Inject 6-12 units per sliding scale daily. <150 6 units, 150-200 8 units, 201-250 10 units, 251-300 12 units. Historical Provider, MD  Active   lancets 33 gauge misc [id number] No Use four times daily José Luis Mesa DO Taking Active   lisinopril 40 mg tablet [id number]  Take 1 tablet (40 mg) by mouth once daily. José Luis Mesa DO  Active   metoprolol tartrate (Lopressor) 100 mg tablet [id number] No Take 0.5 tablets (50 mg) by mouth in the morning and 0.5 tablets (50 mg) before bedtime. TAKE 1/2 TABLET BY MOUTH TWICE A DAILY. José Luis Mesa DO Taking Active   OneTouch Ultra Test strip [id number] No Test 3 times dialy José Luis Mesa DO Taking Active   pen needle, diabetic 31 gauge x 1/4\" needle [id number] No Use as directed Historical Provider, MD Taking Active   spironolactone (Aldactone) 25 mg tablet [id number] No Take 1 tablet (25 mg) by mouth once daily. Historical Provider, MD Taking Active   triamcinolone (Kenalog) 0.1 % cream [id number] No Apply topically. Apply 2-3 times daily to affected area(s) Historical Provider, MD Taking Active                    Allergies  Allergies   Allergen Reactions    Cefaclor Other       Social History  Social History     Socioeconomic History    Marital " status: Single     Spouse name: Not on file    Number of children: Not on file    Years of education: Not on file    Highest education level: Not on file   Occupational History    Not on file   Tobacco Use    Smoking status: Every Day     Packs/day: 1     Types: Cigarettes    Smokeless tobacco: Never   Substance and Sexual Activity    Alcohol use: Not on file    Drug use: Not on file    Sexual activity: Not on file   Other Topics Concern    Not on file   Social History Narrative    Not on file     Social Determinants of Health     Financial Resource Strain: Not on file   Food Insecurity: Not on file   Transportation Needs: Not on file   Physical Activity: Not on file   Stress: Not on file   Social Connections: Not on file   Intimate Partner Violence: Not on file   Housing Stability: Not on file       Surgical History  Past Surgical History:   Procedure Laterality Date    OTHER SURGICAL HISTORY  09/25/2019    No history of surgery       Physical Exam:  GENERAL:  Patient is awake, alert, and oriented to person place and time.  Patient appears well nourished and well kept.  Affect Calm, Not Acutely Distressed.  HEENT:  Normocephalic, Atraumatic, EOMI  CARDIOVASCULAR:  Hemodynamically stable.  RESPIRATORY:  Normal respirations with unlabored breathing.  Extremity: Left knee shows skin is intact.  Trace amount effusion.  He can flex left knee to 130 degrees and full extension at 0 degrees.  Mild pain of the medial and lateral joint.  Pain over the medial and lateral patellar facets.  Pain of the proximal tibia.  Pain of the lateral and medial tibial plateau.  Negative valgus and varus stress test.  Positive Gaetano's test.  He is neurovasc intact.  No erythema or warmth, no clinical sign infection.      Diagnostics: X-rays reviewed  XR knee left 4+ views  Narrative: Interpreted By:  Jacqueline Abdi,   STUDY:  XR KNEE LEFT 4+ VIEWS;  10/19/2023 2:08 pm      INDICATION:  Signs/Symptoms:L knee injury.       COMPARISON:  None.      ACCESSION NUMBER(S):  [id number]      ORDERING CLINICIAN:  SUE COTTER      FINDINGS:  Four views of the left knee obtained.      No acute fracture or osseous displacement. Knee joint spaces are  preserved. Trace patellar spurring and suprapatellar effusion. Small  smooth osseous projection along the posterior margin of the distal  femur. Also linear osseous projection consistent with tug lesion  along the posterior proximal tibia      Impression: No evidence of acute fracture. Trace suprapatellar effusion. Mild  degenerative changes.      Possible small exostosis arising from the posterior distal femur.  Follow-up as clinically warranted.      MACRO:  None.      Signed by: Jacqueline Abdi 10/19/2023 2:20 PM  Dictation workstation:   [id number]        Procedure: None    Assessment: Nondisplaced tibial plateau fracture    Plan: Diaz presents today on my clinic due to scheduling error, patient of Dr. Dr Cole's.  Patient states he continues to have pain with no improvement.  Repeat x-ray shows no significant change.  We did recommend MRI of the left knee to evaluate for tibial plateau fracture, he will follow to Dr Cole after the MRI.    Orders Placed This Encounter    XR knee left 3 views    MR knee left wo IV contrast      At the conclusion of the visit there were no further questions by the patient/family regarding their plan of care.  Patient was instructed to call or return with any issues, questions, or concerns regarding their injury and/or treatment plan described above.     12/12/23 at 11:36 AM - Ronaldo Rosa MD    Office: [phone]    This note was prepared using voice recognition software.  The details of this note are correct and have been reviewed, and corrected to the best of my ability.  Some grammatical errors may persist related to the Dragon software.

## 2023-12-13 ENCOUNTER — TELEPHONE (OUTPATIENT)
Dept: ORTHOPEDIC SURGERY | Facility: CLINIC | Age: 63
End: 2023-12-13

## 2023-12-22 ENCOUNTER — OFFICE VISIT (OUTPATIENT)
Dept: ORTHOPEDIC SURGERY | Facility: CLINIC | Age: 63
End: 2023-12-22
Payer: COMMERCIAL

## 2023-12-22 VITALS — WEIGHT: 227 LBS | BODY MASS INDEX: 35.63 KG/M2 | HEIGHT: 67 IN

## 2023-12-22 DIAGNOSIS — S82.142A TIBIAL PLATEAU FRACTURE, LEFT, CLOSED, INITIAL ENCOUNTER: Primary | ICD-10-CM

## 2023-12-22 PROCEDURE — 3008F BODY MASS INDEX DOCD: CPT | Performed by: STUDENT IN AN ORGANIZED HEALTH CARE EDUCATION/TRAINING PROGRAM

## 2023-12-22 PROCEDURE — 99024 POSTOP FOLLOW-UP VISIT: CPT | Performed by: STUDENT IN AN ORGANIZED HEALTH CARE EDUCATION/TRAINING PROGRAM

## 2023-12-22 PROCEDURE — 4010F ACE/ARB THERAPY RXD/TAKEN: CPT | Performed by: STUDENT IN AN ORGANIZED HEALTH CARE EDUCATION/TRAINING PROGRAM

## 2023-12-22 RX ORDER — TRAMADOL HYDROCHLORIDE 50 MG/1
50 TABLET ORAL 2 TIMES DAILY PRN
Qty: 10 TABLET | Refills: 0 | Status: SHIPPED | OUTPATIENT
Start: 2023-12-22 | End: 2023-12-27

## 2023-12-22 ASSESSMENT — PAIN SCALES - GENERAL: PAINLEVEL_OUTOF10: 7

## 2023-12-22 ASSESSMENT — PAIN - FUNCTIONAL ASSESSMENT: PAIN_FUNCTIONAL_ASSESSMENT: 0-10

## 2023-12-22 NOTE — PROGRESS NOTES
Chief Complaint   Patient presents with    Left Knee - Pain     1. Left knee pain/ strain   DOI- 10/18/23 (Nephew fell on leg) 2 month, 4 days out)  X-Rays        History of Present Illness  Patient returns today for 6 week follow up of left knee.  The patient notes continued pain.  The patient denies any significant numbness or tingling of calf pain.  Patient has a hard time sleeping due to pain discomfort.  Presents today in hopes of obtaining some type of pain medication to help him sleep    Physical Examination:  Left knee:  Skin healthy and intact  Swelling noted   Tenderness: mild   Intact flexion and extension of digits  Neurovascular exam normal distally  2+ dorsalis pedis pulse and good cap refill    Radiographs  XR knee left 3 views    Result Date: 12/12/2023  Interpreted By:  Ronaldo Mahmood, STUDY: XR KNEE LEFT 3 VIEWS;  ;  12/12/2023 11:20 am   INDICATION: Signs/Symptoms:pain.   ACCESSION NUMBER(S): [id number]   ORDERING CLINICIAN: RONALDO MAHMOOD   FINDINGS: Left knee x-rays three views AP, lateral and sunrise view: No acute fractures, no dislocation. Similar appearing degenerative changes. No significant change from previous imaging. Stable appearing distal femur exostosis.     Signed by: Ronaldo Mahmood 12/12/2023 11:45 AM Dictation workstation:   [id number]      Assessment:  Patient with a minimally displaced tibial plateau fracture    Plan:   Discontinue immobilization   Begin working on gentle passive range of motion  We will begin to progress to gentle weightbearing, Discussed the importance of using pain as a guide  Reviewed rehab /return to activities  Given patient's pain and discomfort we did provide him a prescription for tramadol to help him with sleep.  Will only provide this one-time prescription.  I have personally reviewed the OARRS report for this patient. This report is scanned into the electronic medical record. I have considered the risks of abuse, dependence, addiction, and  diversion. They currently report a pain of 8.

## 2024-01-03 DIAGNOSIS — E78.5 HYPERLIPIDEMIA, UNSPECIFIED HYPERLIPIDEMIA TYPE: ICD-10-CM

## 2024-01-03 RX ORDER — ATORVASTATIN CALCIUM 40 MG/1
40 TABLET, FILM COATED ORAL DAILY
Qty: 90 TABLET | Refills: 3 | Status: SHIPPED | OUTPATIENT
Start: 2024-01-03

## 2024-01-03 NOTE — TELEPHONE ENCOUNTER
Rx Refill Request Telephone Encounter    Name:  Diaz L Kaleb  :  [id number]  Medication Name: Atorvastatin refill request

## 2024-01-06 ENCOUNTER — HOSPITAL ENCOUNTER (OUTPATIENT)
Dept: RADIOLOGY | Facility: HOSPITAL | Age: 64
Discharge: HOME | End: 2024-01-06
Payer: COMMERCIAL

## 2024-01-06 DIAGNOSIS — S82.142A TIBIAL PLATEAU FRACTURE, LEFT, CLOSED, INITIAL ENCOUNTER: ICD-10-CM

## 2024-01-06 PROCEDURE — 73721 MRI JNT OF LWR EXTRE W/O DYE: CPT | Mod: LT

## 2024-01-06 PROCEDURE — 73721 MRI JNT OF LWR EXTRE W/O DYE: CPT | Mod: LEFT SIDE | Performed by: STUDENT IN AN ORGANIZED HEALTH CARE EDUCATION/TRAINING PROGRAM

## 2024-01-10 ENCOUNTER — HOSPITAL ENCOUNTER (EMERGENCY)
Facility: HOSPITAL | Age: 64
Discharge: HOME | End: 2024-01-10
Payer: COMMERCIAL

## 2024-01-10 ENCOUNTER — APPOINTMENT (OUTPATIENT)
Dept: RADIOLOGY | Facility: HOSPITAL | Age: 64
End: 2024-01-10
Payer: COMMERCIAL

## 2024-01-10 VITALS
HEART RATE: 76 BPM | RESPIRATION RATE: 16 BRPM | WEIGHT: 227 LBS | OXYGEN SATURATION: 97 % | TEMPERATURE: 98.2 F | SYSTOLIC BLOOD PRESSURE: 123 MMHG | BODY MASS INDEX: 35.63 KG/M2 | HEIGHT: 67 IN | DIASTOLIC BLOOD PRESSURE: 76 MMHG

## 2024-01-10 DIAGNOSIS — K52.9 COLITIS: ICD-10-CM

## 2024-01-10 DIAGNOSIS — R19.4 CHANGE IN BOWEL HABITS: Primary | ICD-10-CM

## 2024-01-10 DIAGNOSIS — Q61.02 MULTIPLE RENAL CYSTS: ICD-10-CM

## 2024-01-10 DIAGNOSIS — N40.0 ENLARGED PROSTATE: ICD-10-CM

## 2024-01-10 LAB
ALBUMIN SERPL BCP-MCNC: 4.4 G/DL (ref 3.4–5)
ALP SERPL-CCNC: 65 U/L (ref 33–136)
ALT SERPL W P-5'-P-CCNC: 14 U/L (ref 10–52)
ANION GAP SERPL CALC-SCNC: 12 MMOL/L (ref 10–20)
AST SERPL W P-5'-P-CCNC: 15 U/L (ref 9–39)
BASOPHILS # BLD AUTO: 0.08 X10*3/UL (ref 0–0.1)
BASOPHILS NFR BLD AUTO: 1 %
BILIRUB SERPL-MCNC: 0.4 MG/DL (ref 0–1.2)
BUN SERPL-MCNC: 32 MG/DL (ref 6–23)
CALCIUM SERPL-MCNC: 9.3 MG/DL (ref 8.6–10.3)
CHLORIDE SERPL-SCNC: 99 MMOL/L (ref 98–107)
CO2 SERPL-SCNC: 26 MMOL/L (ref 21–32)
CREAT SERPL-MCNC: 1.6 MG/DL (ref 0.5–1.3)
EGFRCR SERPLBLD CKD-EPI 2021: 48 ML/MIN/1.73M*2
EOSINOPHIL # BLD AUTO: 0.21 X10*3/UL (ref 0–0.7)
EOSINOPHIL NFR BLD AUTO: 2.6 %
ERYTHROCYTE [DISTWIDTH] IN BLOOD BY AUTOMATED COUNT: 12.9 % (ref 11.5–14.5)
GLUCOSE SERPL-MCNC: 159 MG/DL (ref 74–99)
HCT VFR BLD AUTO: 40.5 % (ref 41–52)
HGB BLD-MCNC: 13.5 G/DL (ref 13.5–17.5)
IMM GRANULOCYTES # BLD AUTO: 0.06 X10*3/UL (ref 0–0.7)
IMM GRANULOCYTES NFR BLD AUTO: 0.8 % (ref 0–0.9)
LIPASE SERPL-CCNC: 54 U/L (ref 9–82)
LYMPHOCYTES # BLD AUTO: 1.19 X10*3/UL (ref 1.2–4.8)
LYMPHOCYTES NFR BLD AUTO: 14.9 %
MCH RBC QN AUTO: 30.5 PG (ref 26–34)
MCHC RBC AUTO-ENTMCNC: 33.3 G/DL (ref 32–36)
MCV RBC AUTO: 92 FL (ref 80–100)
MONOCYTES # BLD AUTO: 0.46 X10*3/UL (ref 0.1–1)
MONOCYTES NFR BLD AUTO: 5.8 %
NEUTROPHILS # BLD AUTO: 5.96 X10*3/UL (ref 1.2–7.7)
NEUTROPHILS NFR BLD AUTO: 74.9 %
NRBC BLD-RTO: 0 /100 WBCS (ref 0–0)
PLATELET # BLD AUTO: 148 X10*3/UL (ref 150–450)
POTASSIUM SERPL-SCNC: 4.7 MMOL/L (ref 3.5–5.3)
PROT SERPL-MCNC: 7.7 G/DL (ref 6.4–8.2)
RBC # BLD AUTO: 4.42 X10*6/UL (ref 4.5–5.9)
SODIUM SERPL-SCNC: 132 MMOL/L (ref 136–145)
WBC # BLD AUTO: 8 X10*3/UL (ref 4.4–11.3)

## 2024-01-10 PROCEDURE — 96360 HYDRATION IV INFUSION INIT: CPT | Mod: 59 | Performed by: PHYSICIAN ASSISTANT

## 2024-01-10 PROCEDURE — 80053 COMPREHEN METABOLIC PANEL: CPT | Performed by: PHYSICIAN ASSISTANT

## 2024-01-10 PROCEDURE — 99284 EMERGENCY DEPT VISIT MOD MDM: CPT

## 2024-01-10 PROCEDURE — 74177 CT ABD & PELVIS W/CONTRAST: CPT

## 2024-01-10 PROCEDURE — 2550000001 HC RX 255 CONTRASTS: Performed by: PHYSICIAN ASSISTANT

## 2024-01-10 PROCEDURE — 74177 CT ABD & PELVIS W/CONTRAST: CPT | Performed by: RADIOLOGY

## 2024-01-10 PROCEDURE — 36415 COLL VENOUS BLD VENIPUNCTURE: CPT | Performed by: PHYSICIAN ASSISTANT

## 2024-01-10 PROCEDURE — 2500000004 HC RX 250 GENERAL PHARMACY W/ HCPCS (ALT 636 FOR OP/ED): Performed by: PHYSICIAN ASSISTANT

## 2024-01-10 PROCEDURE — 99284 EMERGENCY DEPT VISIT MOD MDM: CPT | Performed by: PHYSICIAN ASSISTANT

## 2024-01-10 PROCEDURE — 83690 ASSAY OF LIPASE: CPT | Performed by: PHYSICIAN ASSISTANT

## 2024-01-10 PROCEDURE — 85025 COMPLETE CBC W/AUTO DIFF WBC: CPT | Performed by: PHYSICIAN ASSISTANT

## 2024-01-10 RX ORDER — POLYETHYLENE GLYCOL 3350 17 G/17G
238 POWDER, FOR SOLUTION ORAL ONCE
Qty: 238 G | Refills: 0 | Status: SHIPPED | OUTPATIENT
Start: 2024-01-10 | End: 2024-01-10

## 2024-01-10 RX ADMIN — IOHEXOL 75 ML: 350 INJECTION, SOLUTION INTRAVENOUS at 14:04

## 2024-01-10 RX ADMIN — SODIUM CHLORIDE 1000 ML: 9 INJECTION, SOLUTION INTRAVENOUS at 11:34

## 2024-01-10 ASSESSMENT — LIFESTYLE VARIABLES
HAVE PEOPLE ANNOYED YOU BY CRITICIZING YOUR DRINKING: NO
HAVE YOU EVER FELT YOU SHOULD CUT DOWN ON YOUR DRINKING: NO
REASON UNABLE TO ASSESS: NO
EVER FELT BAD OR GUILTY ABOUT YOUR DRINKING: NO
EVER HAD A DRINK FIRST THING IN THE MORNING TO STEADY YOUR NERVES TO GET RID OF A HANGOVER: NO

## 2024-01-10 ASSESSMENT — PAIN DESCRIPTION - PAIN TYPE: TYPE: ACUTE PAIN

## 2024-01-10 ASSESSMENT — PAIN DESCRIPTION - LOCATION: LOCATION: BUTTOCKS

## 2024-01-10 ASSESSMENT — PAIN - FUNCTIONAL ASSESSMENT: PAIN_FUNCTIONAL_ASSESSMENT: 0-10

## 2024-01-10 ASSESSMENT — PAIN SCALES - GENERAL: PAINLEVEL_OUTOF10: 10 - WORST POSSIBLE PAIN

## 2024-01-10 NOTE — Clinical Note
Diaz Manuel was seen and treated in our emergency department on 1/10/2024.  He may return to work on 01/13/2024.       If you have any questions or concerns, please don't hesitate to call.      Parker Ashby PA-C

## 2024-01-10 NOTE — ED PROVIDER NOTES
HPI   Chief Complaint   Patient presents with    Difficulty Urinating     Last urination x2 hours ago       A 63-year-old male patient comes in the emergency department today with complaints of difficulty with having bowel movement for the last 2 weeks.  Denies any associated fevers, chills, nausea, vomiting.  States he does have abdominal pains with this which he states can get quite severe which was 10 out of 10 on the pain scale.  For this purpose he comes in the emergency department today for further evaluation.  States he tried to take an over-the-counter laxative without any success.                          [place] Coma Scale Score: 15                  Patient History   No past medical history on file.  Past Surgical History:   Procedure Laterality Date    OTHER SURGICAL HISTORY  09/25/2019    No history of surgery     Family History   Problem Relation Name Age of Onset    Heart failure Mother      Other (black lung) Father       Social History     Tobacco Use    Smoking status: Every Day     Packs/day: 1     Types: Cigarettes    Smokeless tobacco: Never   Substance Use Topics    Alcohol use: Not on file    Drug use: Not on file       Physical Exam   ED Triage Vitals   Temp Heart Rate Resp BP   01/10/24 1119 01/10/24 1119 01/10/24 1119 01/10/24 1119   36.8 °C (98.2 °F) 82 16 146/80      SpO2 Temp Source Heart Rate Source Patient Position   01/10/24 1119 01/10/24 1119 01/10/24 1119 01/10/24 1223   96 % Temporal Monitor Lying      BP Location FiO2 (%)     01/10/24 1223 --     Left arm        Physical Exam  Constitutional:       General: He is in acute distress (Mild distress).      Appearance: Normal appearance. He is not ill-appearing.   HENT:      Head: Normocephalic and atraumatic.      Nose: Nose normal.   Eyes:      Extraocular Movements: Extraocular movements intact.      Conjunctiva/sclera: Conjunctivae normal.      Pupils: Pupils are equal, round, and reactive to light.   Cardiovascular:      Rate and  Rhythm: Normal rate and regular rhythm.   Pulmonary:      Effort: Pulmonary effort is normal. No respiratory distress.      Breath sounds: Normal breath sounds. No stridor. No wheezing.   Abdominal:      General: There is distension.      Tenderness: There is abdominal tenderness (Left lower quadrant).   Musculoskeletal:         General: Normal range of motion.      Cervical back: Normal range of motion.   Skin:     General: Skin is warm and dry.   Neurological:      General: No focal deficit present.      Mental Status: He is alert and oriented to person, place, and time. Mental status is at baseline.   Psychiatric:         Mood and Affect: Mood normal.         ED Course & MDM   Diagnoses as of 01/10/24 1526   Change in bowel habits   Enlarged prostate   Multiple renal cysts   Colitis       Medical Decision Making  A 63-year-old male patient comes in the emergency department today with complaints of difficulty with having bowel movement for the last 2 weeks.  Denies any associated fevers, chills, nausea, vomiting.  States he does have abdominal pains with this which he states can get quite severe which was 10 out of 10 on the pain scale.  For this purpose he comes in the emergency department today for further evaluation.  States he tried to take an over-the-counter laxative without any success.    CT study of the abdomen pelvis ordered to rule out any bowel obstruction, volvulus or intussusception as well as laboratory studies rule leukocytosis, acute kidney injury, leukocytosis.  Patient ordered IV fluids.    Patient has a creatinine of 1.6 GFR 48 is normal for the patient.  Patient's white blood cell count is 8.  Patient CT study shows findings consistent with a colitis.  As well as some renal cysts and prostamegaly.  Offered the patient a enema here versus medication for home patient would like to go home with medication.  Will discharge patient home this time.  Will discharge patient with p.o. MiraLAX to be  mixed with 64 ounces of Gatorade.  Will discharge patient home    Diagnosis: Change in bowel habits, chronic renal disease, prostamegaly, renal cysts      Labs Reviewed   CBC WITH AUTO DIFFERENTIAL - Abnormal       Result Value    WBC 8.0      nRBC 0.0      RBC 4.42 (*)     Hemoglobin 13.5      Hematocrit 40.5 (*)     MCV 92      MCH 30.5      MCHC 33.3      RDW 12.9      Platelets 148 (*)     Neutrophils % 74.9      Immature Granulocytes %, Automated 0.8      Lymphocytes % 14.9      Monocytes % 5.8      Eosinophils % 2.6      Basophils % 1.0      Neutrophils Absolute 5.96      Immature Granulocytes Absolute, Automated 0.06      Lymphocytes Absolute 1.19 (*)     Monocytes Absolute 0.46      Eosinophils Absolute 0.21      Basophils Absolute 0.08     COMPREHENSIVE METABOLIC PANEL - Abnormal    Glucose 159 (*)     Sodium 132 (*)     Potassium 4.7      Chloride 99      Bicarbonate 26      Anion Gap 12      Urea Nitrogen 32 (*)     Creatinine 1.60 (*)     eGFR 48 (*)     Calcium 9.3      Albumin 4.4      Alkaline Phosphatase 65      Total Protein 7.7      AST 15      Bilirubin, Total 0.4      ALT 14     LIPASE - Normal    Lipase 54      Narrative:     Venipuncture immediately after or during the administration of Metamizole may lead to falsely low results. Testing should be performed immediately prior to Metamizole dosing.        CT abdomen pelvis w IV contrast   Final Result   Diffuse bowel wall thickening involving the ascending and transverse   colon suggestive of colitis in the appropriate clinical setting.        Prostatomegaly.        Bilateral Bosniak 1 and 2 renal cysts.        Signed by: Joaquin Lewis 1/10/2024 2:53 PM   Dictation workstation:   [id number]          Procedure  Procedures     Parker Ashby PA-C  01/10/24 1526

## 2024-01-12 ENCOUNTER — OFFICE VISIT (OUTPATIENT)
Dept: ORTHOPEDIC SURGERY | Facility: CLINIC | Age: 64
End: 2024-01-12
Payer: COMMERCIAL

## 2024-01-12 ENCOUNTER — ANCILLARY PROCEDURE (OUTPATIENT)
Dept: RADIOLOGY | Facility: CLINIC | Age: 64
End: 2024-01-12
Payer: COMMERCIAL

## 2024-01-12 VITALS — WEIGHT: 227 LBS | BODY MASS INDEX: 35.63 KG/M2 | HEIGHT: 67 IN

## 2024-01-12 DIAGNOSIS — S82.142S CLOSED FRACTURE OF LEFT TIBIAL PLATEAU, SEQUELA: ICD-10-CM

## 2024-01-12 PROCEDURE — 73560 X-RAY EXAM OF KNEE 1 OR 2: CPT | Mod: LEFT SIDE | Performed by: STUDENT IN AN ORGANIZED HEALTH CARE EDUCATION/TRAINING PROGRAM

## 2024-01-12 PROCEDURE — 73560 X-RAY EXAM OF KNEE 1 OR 2: CPT | Mod: LT

## 2024-01-12 PROCEDURE — L1812 KO ELASTIC W/JOINTS PRE OTS: HCPCS | Performed by: STUDENT IN AN ORGANIZED HEALTH CARE EDUCATION/TRAINING PROGRAM

## 2024-01-12 PROCEDURE — 99024 POSTOP FOLLOW-UP VISIT: CPT | Performed by: STUDENT IN AN ORGANIZED HEALTH CARE EDUCATION/TRAINING PROGRAM

## 2024-01-12 PROCEDURE — 3008F BODY MASS INDEX DOCD: CPT | Performed by: STUDENT IN AN ORGANIZED HEALTH CARE EDUCATION/TRAINING PROGRAM

## 2024-01-12 PROCEDURE — 4010F ACE/ARB THERAPY RXD/TAKEN: CPT | Performed by: STUDENT IN AN ORGANIZED HEALTH CARE EDUCATION/TRAINING PROGRAM

## 2024-01-12 RX ORDER — DICLOFENAC SODIUM 10 MG/G
4 GEL TOPICAL 2 TIMES DAILY
Qty: 100 G | Refills: 0 | Status: SHIPPED | OUTPATIENT
Start: 2024-01-12

## 2024-01-12 ASSESSMENT — PAIN - FUNCTIONAL ASSESSMENT: PAIN_FUNCTIONAL_ASSESSMENT: NO/DENIES PAIN

## 2024-01-12 NOTE — PROGRESS NOTES
Chief Complaint   Patient presents with    Left Knee - Follow-up     MRI review   1. Left knee minimally displaced tibial plateau fracture  DOI- 10/18/23 (Nephew fell on leg) 2 month, 4 days out)  MRI done 1/6/24       History of Present Illness  Patient returns today for 12 week follow up of left knee.  The patient notes improvement in pain with gentle return to everyday activities.  The patient denies any significant numbness or tingling or calf pain.      Physical Examination:  Left knee:  Skin healthy and intact  Swelling noted   Tenderness: mild   Intact flexion and extension of digits  Neurovascular exam normal distally  2+ dorsalis pedis pulse and good cap refill    Radiographs  XR knee left 1-2 views    Result Date: 1/12/2024  Interpreted By:  Ashvin Florentino III, STUDY: XR KNEE LEFT 1-2 VIEWS; ;  1/12/2024 11:17 am   INDICATION: Signs/Symptoms:pain.   COMPARISON: None.   ACCESSION NUMBER(S): [id number]   ORDERING CLINICIAN: ASHVIN FLORENTINO   FINDINGS: 2 weight-bearing views right knee: Healed tibial plateau fracture in near anatomic alignment. There is fracture line consolidation/obliteration consistent with a healed tibial plateau fracture. Maintained joint space within the mediolateral compartments of the knee.       Healed tibial plateau fracture     MACRO: None   Signed by: Ashvin Florentino III 1/12/2024 11:32 AM Dictation workstation:   [id number]    CT abdomen pelvis w IV contrast    Result Date: 1/10/2024  Interpreted By:  Joaquin Lewis, STUDY: CT ABDOMEN PELVIS W IV CONTRAST;  1/10/2024 2:03 pm   INDICATION: 62 y/o   M with  Signs/Symptoms:Change in bowel habits, difficulty passing stool.   LIMITATIONS: None.   ACCESSION NUMBER(S): [id number]   ORDERING CLINICIAN: BILL CALVILLO   TECHNIQUE: After the administration of IV iodonated contrast, spiral axial images were obtained from the xiphoid down through the symphysis pubis. Sagittal and coronal reconstruction images were generated. 75 mL of  Omnipaque 350.   COMPARISON: None.   FINDINGS: Lower Chest: Clear.   Liver: The liver is unremarkable without focal lesion.   Gallbladder and Biliary: Small gallbladder with few peripheral calcifications.   Pancreas: No abnormality identified in the pancreas.   Spleen: No abnormality identified in the spleen.   Adrenals: No abnormality identified in either adrenal gland.   Urinary: Multiple bilateral renal cysts. There is a cyst with a peripheral calcification in the right kidney measuring 2.8 cm, Bosniak 2. no hydronephrosis. Prostatomegaly.   Gastrointestinal/Peritoneum: No small or large bowel obstruction in the visualized abdomen. In the abdomen, there is no extraluminal air. No significant free fluid. Appendix not seen. No secondary signs of appendicitis in the pericecal region. Diffuse bowel wall thickening involving the ascending and transverse colon suggestive of colitis in the appropriate clinical setting.   Vascular: Abdominal aorta is normal in caliber.Atherosclerosis.   Lymphatics: No enlarged lymph nodes by size criteria.   MSK/Body Wall: No aggressive bony lesion identified. Scattered degenerative changes in the spine. Small fat containing umbilical hernia.       Diffuse bowel wall thickening involving the ascending and transverse colon suggestive of colitis in the appropriate clinical setting.   Prostatomegaly.   Bilateral Bosniak 1 and 2 renal cysts.   Signed by: Joaquin Lewis 1/10/2024 2:53 PM Dictation workstation:   [id number]    MR knee left wo IV contrast    Result Date: 1/6/2024  Interpreted By:  Harjeet Valencia, STUDY: MRI of the  left knee without IV contrast;  1/6/2024 8:47 am   INDICATION: Signs/Symptoms:pain.   COMPARISON: 12/12/2023   ACCESSION NUMBER(S): [id number]   ORDERING CLINICIAN: ANGELITA MAHMOOD   TECHNIQUE: MR imaging of the  left knee was obtained  without IV contrast.   FINDINGS: LIGAMENTS AND TENDONS: The anterior cruciate ligament and the posterior cruciate ligaments are  intact. The medial collateral ligament demonstrates mild periligamentous edema thickening consistent with a grade 1 sprain.. The lateral collateral ligament, the biceps femoris tendon, the popliteus tendon and the iliotibial band are intact. The quadriceps tendon and the patellar tendon are intact.   MENISCI: The medial meniscus is intact and without evidence of tear. The lateral meniscus is intact and without evidence of tear.   JOINTS: Mild diffuse thinning of the medial femorotibial articular cartilage. Mild diffuse thinning of the lateral femorotibial articular cartilage. Mild diffuse patellofemoral articular cartilage thinning with focal moderate thinning of the lateral patellar facet. Small suprapatellar joint effusion. No sizable popliteal cyst.   OSSEOUS STRUCTURES: There is marked marrow edema involving the posterior aspect of the lateral femoral condyle and periphery of lateral tibial plateau. There crescentic areas of subchondral hypointense T1 linear signal abnormality consistent with fracture lines. The lateral tibial plateau fracture focally disrupts the articular surface with minimal articular surface depression up to 1.5 mm. No marrow replacing lesions.   SOFT TISSUES: There is no muscle atrophy or tear. The common peroneal nerve is intact.       Osteochondral impaction fractures involving the posterior aspect lateral femoral condyle and periphery of the lateral tibial plateau. There is less than 2 mm of lateral tibial plateau articular surface depression with focal subchondral bone plate disruption.   Mild tricompartmental articular cartilage thinning.   Small suprapatellar joint effusion.   Grade 1 medial collateral ligament sprain.   MACRO: Critical Finding:  Fracture, unexpected, likely non-surgical. Notification was initiated on 1/6/2024 at 12:02 pm by  Harjeet Valencia. (**-YCF-**) Instructions:   Signed by: Harjeet Valencia 1/6/2024 12:02 PM Dictation workstation:    [id number]      Assessment:  Patient with a  healed tibial plateau fracture    Plan:   Weight-bear as tolerated activities as tolerated using pain as a guide  Discussed full course of recovery can take upwards of 1 year  If Patient feels like they are not progressing then return to clinic for repeat evaluation  Follow-up as needed  Will provide a free sport brace  Will also give patient refill of Voltaren cream which provided him significant relief  We discussed the use of nonsteroidal anti-inflammatory drugs as part of a treatment plan. We discussed that these may result in GI upset and/or bleeding. Any stomach pain or dark hard stools would be reason to discontinue use. We discussed that when used over the long-term these medications can result in altered renal or hepatic function, and that routine use beyond 3 months requires follow-up with their primary provider for monitoring.  They expressed their understanding and agree with the plan.      We discussed the importance of vitamins particularly the use of vitamin D to maximize nutritional environment for healing of fracture. Patient agrees to optimize nutrition and diet to provide favorable environment for healing.

## 2024-01-25 DIAGNOSIS — Z79.4 TYPE 2 DIABETES MELLITUS WITHOUT COMPLICATION, WITH LONG-TERM CURRENT USE OF INSULIN (MULTI): ICD-10-CM

## 2024-01-25 DIAGNOSIS — E11.9 TYPE 2 DIABETES MELLITUS WITHOUT COMPLICATION, WITH LONG-TERM CURRENT USE OF INSULIN (MULTI): ICD-10-CM

## 2024-01-25 RX ORDER — LANCETS 33 GAUGE
EACH MISCELLANEOUS
Qty: 300 EACH | Refills: 3 | Status: SHIPPED | OUTPATIENT
Start: 2024-01-25

## 2024-01-25 NOTE — TELEPHONE ENCOUNTER
Pt called, request refill for one touch needles send to:   [id number] - [place], [place] - [address] AT [address] & [address] Phone: [phone]   Fax: [phone]

## 2024-01-26 DIAGNOSIS — E11.9 TYPE 2 DIABETES MELLITUS WITHOUT COMPLICATION, WITH LONG-TERM CURRENT USE OF INSULIN (MULTI): ICD-10-CM

## 2024-01-26 DIAGNOSIS — Z79.4 TYPE 2 DIABETES MELLITUS WITHOUT COMPLICATION, WITH LONG-TERM CURRENT USE OF INSULIN (MULTI): ICD-10-CM

## 2024-01-26 RX ORDER — BLOOD SUGAR DIAGNOSTIC
STRIP MISCELLANEOUS
Qty: 300 STRIP | Refills: 3 | Status: SHIPPED | OUTPATIENT
Start: 2024-01-26

## 2024-02-13 ENCOUNTER — TELEPHONE (OUTPATIENT)
Dept: PRIMARY CARE | Facility: CLINIC | Age: 64
End: 2024-02-13
Payer: COMMERCIAL

## 2024-02-13 DIAGNOSIS — E11.00 TYPE 2 DIABETES MELLITUS WITH HYPEROSMOLARITY WITHOUT NONKETOTIC HYPERGLYCEMIC-HYPEROSMOLAR COMA (NKHHC) (MULTI): ICD-10-CM

## 2024-02-13 RX ORDER — ISOPROPYL ALCOHOL 70 ML/100ML
1 SWAB TOPICAL 3 TIMES DAILY
Qty: 300 EACH | Refills: 3 | Status: SHIPPED | OUTPATIENT
Start: 2024-02-13

## 2024-02-13 NOTE — TELEPHONE ENCOUNTER
Rx Refill Request Telephone Encounter    Name:  Diaz Manuel  :  [id number]  Medication Name:  alcohol swab pads  Dose : 1 pad  Route : topical  Frequency : 3 times daily  Specific Pharmacy location:  [facility]

## 2024-02-20 ENCOUNTER — TELEPHONE (OUTPATIENT)
Dept: PRIMARY CARE | Facility: CLINIC | Age: 64
End: 2024-02-20

## 2024-02-20 ENCOUNTER — OFFICE VISIT (OUTPATIENT)
Dept: PRIMARY CARE | Facility: CLINIC | Age: 64
End: 2024-02-20
Payer: COMMERCIAL

## 2024-02-20 VITALS
DIASTOLIC BLOOD PRESSURE: 74 MMHG | BODY MASS INDEX: 35.87 KG/M2 | WEIGHT: 229 LBS | SYSTOLIC BLOOD PRESSURE: 118 MMHG | HEART RATE: 66 BPM | OXYGEN SATURATION: 95 %

## 2024-02-20 DIAGNOSIS — L23.9 ALLERGIC CONTACT DERMATITIS, UNSPECIFIED TRIGGER: Primary | ICD-10-CM

## 2024-02-20 PROCEDURE — 99213 OFFICE O/P EST LOW 20 MIN: CPT | Performed by: FAMILY MEDICINE

## 2024-02-20 PROCEDURE — 3008F BODY MASS INDEX DOCD: CPT | Performed by: FAMILY MEDICINE

## 2024-02-20 PROCEDURE — 3078F DIAST BP <80 MM HG: CPT | Performed by: FAMILY MEDICINE

## 2024-02-20 PROCEDURE — 3074F SYST BP LT 130 MM HG: CPT | Performed by: FAMILY MEDICINE

## 2024-02-20 PROCEDURE — 4010F ACE/ARB THERAPY RXD/TAKEN: CPT | Performed by: FAMILY MEDICINE

## 2024-02-20 RX ORDER — TRIAMCINOLONE ACETONIDE 1 MG/G
1 CREAM TOPICAL 2 TIMES DAILY PRN
Qty: 30 G | Refills: 0 | Status: SHIPPED | OUTPATIENT
Start: 2024-02-20 | End: 2024-03-05

## 2024-02-20 NOTE — PROGRESS NOTES
Subjective   Patient ID: Diaz Manuel is a 63 y.o. male who presents for No chief complaint on file..    Rash  This is a new problem.   Here today to discuss rash.  This involves both shins and also his abdomen.  Rash is itchy.  No pain.  Nothing to treat.  No fever.  No history of similar symptoms in past.  No known potential allergens      Review of Systems   Skin:  Positive for rash.       Objective   /74   Pulse 66   Wt 104 kg (229 lb)   SpO2 95%   BMI 35.87 kg/m²     Physical Exam  Constitutional:       General: He is not in acute distress.     Appearance: Normal appearance. He is well-developed.   Pulmonary:      Effort: Pulmonary effort is normal.   Skin:     Findings: Rash present.      Comments: There are several dull erythematous, dry, excoriated papules and plaques scattered on bilateral shins, and also on abdomen.  No vesicles or pustules   Neurological:      Mental Status: He is alert.   Psychiatric:         Mood and Affect: Mood normal.         Behavior: Behavior normal.         Assessment/Plan   Problem List Items Addressed This Visit    None  Visit Diagnoses       Allergic contact dermatitis, unspecified trigger    -  Primary    Relevant Medications    triamcinolone (Kenalog) 0.1 % cream        Rash today appears to be an allergic contact dermatitis.  Will treat with topical triamcinolone cream twice daily for up to 1 to 2 weeks.  Follow-up if rash does not resolve in the next 1 to 2 weeks

## 2024-02-20 NOTE — TELEPHONE ENCOUNTER
Patient is in office with Gurpreet and is wanting to know if you can see him for a rash on both legs    Please Advise

## 2024-03-05 ENCOUNTER — TELEPHONE (OUTPATIENT)
Dept: PRIMARY CARE | Facility: CLINIC | Age: 64
End: 2024-03-05

## 2024-03-05 ENCOUNTER — OFFICE VISIT (OUTPATIENT)
Dept: PRIMARY CARE | Facility: CLINIC | Age: 64
End: 2024-03-05
Payer: COMMERCIAL

## 2024-03-05 VITALS
OXYGEN SATURATION: 96 % | HEIGHT: 66 IN | HEART RATE: 67 BPM | DIASTOLIC BLOOD PRESSURE: 71 MMHG | BODY MASS INDEX: 37.61 KG/M2 | WEIGHT: 234 LBS | SYSTOLIC BLOOD PRESSURE: 110 MMHG

## 2024-03-05 DIAGNOSIS — N18.31 STAGE 3A CHRONIC KIDNEY DISEASE (MULTI): ICD-10-CM

## 2024-03-05 DIAGNOSIS — Z87.891 PERSONAL HISTORY OF NICOTINE DEPENDENCE: ICD-10-CM

## 2024-03-05 DIAGNOSIS — Z00.00 ROUTINE GENERAL MEDICAL EXAMINATION AT HEALTH CARE FACILITY: ICD-10-CM

## 2024-03-05 DIAGNOSIS — I10 HYPERTENSION, UNSPECIFIED TYPE: ICD-10-CM

## 2024-03-05 DIAGNOSIS — E78.5 HYPERLIPIDEMIA, UNSPECIFIED HYPERLIPIDEMIA TYPE: ICD-10-CM

## 2024-03-05 DIAGNOSIS — Z12.11 SCREENING FOR COLON CANCER: ICD-10-CM

## 2024-03-05 DIAGNOSIS — E11.9 TYPE 2 DIABETES MELLITUS WITHOUT COMPLICATION, WITH LONG-TERM CURRENT USE OF INSULIN (MULTI): Primary | ICD-10-CM

## 2024-03-05 DIAGNOSIS — Z79.4 TYPE 2 DIABETES MELLITUS WITHOUT COMPLICATION, WITH LONG-TERM CURRENT USE OF INSULIN (MULTI): Primary | ICD-10-CM

## 2024-03-05 DIAGNOSIS — I63.9 CEREBROVASCULAR ACCIDENT (CVA), UNSPECIFIED MECHANISM (MULTI): ICD-10-CM

## 2024-03-05 DIAGNOSIS — Z12.5 SCREENING FOR PROSTATE CANCER: ICD-10-CM

## 2024-03-05 LAB — POC HEMOGLOBIN A1C: 6.7 % (ref 4.2–6.5)

## 2024-03-05 PROCEDURE — 99214 OFFICE O/P EST MOD 30 MIN: CPT | Performed by: FAMILY MEDICINE

## 2024-03-05 PROCEDURE — G0439 PPPS, SUBSEQ VISIT: HCPCS | Performed by: FAMILY MEDICINE

## 2024-03-05 PROCEDURE — 3074F SYST BP LT 130 MM HG: CPT | Performed by: FAMILY MEDICINE

## 2024-03-05 PROCEDURE — 3078F DIAST BP <80 MM HG: CPT | Performed by: FAMILY MEDICINE

## 2024-03-05 PROCEDURE — 83036 HEMOGLOBIN GLYCOSYLATED A1C: CPT | Performed by: FAMILY MEDICINE

## 2024-03-05 PROCEDURE — 4010F ACE/ARB THERAPY RXD/TAKEN: CPT | Performed by: FAMILY MEDICINE

## 2024-03-05 PROCEDURE — 3008F BODY MASS INDEX DOCD: CPT | Performed by: FAMILY MEDICINE

## 2024-03-05 ASSESSMENT — ENCOUNTER SYMPTOMS
HEADACHES: 0
FEVER: 0
COUGH: 0
ABDOMINAL PAIN: 0
DIABETIC ASSOCIATED SYMPTOMS: 0
PALPITATIONS: 0
SHORTNESS OF BREATH: 0
DIZZINESS: 0
NUMBNESS: 0
WHEEZING: 0

## 2024-03-05 ASSESSMENT — ACTIVITIES OF DAILY LIVING (ADL)
BATHING: INDEPENDENT
GROCERY_SHOPPING: INDEPENDENT
TAKING_MEDICATION: INDEPENDENT
DOING_HOUSEWORK: INDEPENDENT
MANAGING_FINANCES: INDEPENDENT
DRESSING: INDEPENDENT

## 2024-03-05 NOTE — PROGRESS NOTES
Subjective   Patient ID: Diaz Manuel is a 63 y.o. male who presents for Annual Exam and Diabetes.    Diabetes  He presents for his follow-up diabetic visit. He has type 2 diabetes mellitus. There are no hypoglycemic associated symptoms. Pertinent negatives for hypoglycemia include no dizziness or headaches. There are no diabetic associated symptoms. Pertinent negatives for diabetes include no chest pain. He is following a generally healthy diet. His overall blood glucose range is 110-130 mg/dl. Eye exam is not current.   He is here today for annual wellness visit and also 4-month follow-up  Diabetes mellitus: We have been prescribing him Lantus 40 units daily and also NovoLog sliding scale (2 units insulin for every 50 glucose greater than 150 3 times daily), however his home glucose readings have been close to goal and he states that he has not been using Lantus recently, and has only been administering the NovoLog sliding scale.  Average home glucose has been 148, and he typically only gives himself 2 units of NovoLog at a time.  No hypoglycemia.  He would like a referral to ophthalmology for his diabetic eye exam.  No blurred vision  He is taking spironolactone, metoprolol, chlorthalidone, lisinopril and amlodipine for hypertension.  No elevated BP readings out of office.  No headache or dizziness  Takes atorvastatin 40 mg daily and omega-3 fish oils OTC for hyperlipidemia.  Last lipid panel done 6/16/2023 showed LDL 23.  Triglycerides 313  He follows with nephrology annually for stage IIIa chronic kidney disease.  He is current smoker.  Has smoked 1 pack/day since age 13  Last colonoscopy was 2021.  Repeat 3 years recommended  Last PSA done June/2023 was normal range  He has not received the Shingrix vaccine.  Received tetanus and Prevnar 20 vaccinations in 2022  He feels that he has been doing well with diet and has been staying active for exercise.  Sleeping well  No other concerns today    Review of Systems  "  Constitutional:  Negative for fever.   Eyes:  Negative for visual disturbance.   Respiratory:  Negative for cough, shortness of breath and wheezing.    Cardiovascular:  Negative for chest pain, palpitations and leg swelling.   Gastrointestinal:  Negative for abdominal pain.   Skin:  Negative for rash.   Neurological:  Negative for dizziness, numbness and headaches.   All other systems reviewed and are negative.      Objective   /71   Pulse 67   Ht 1.676 m (5' 6\")   Wt 106 kg (234 lb)   SpO2 96%   BMI 37.77 kg/m²     Physical Exam  Vitals reviewed.   Constitutional:       General: He is not in acute distress.     Appearance: Normal appearance. He is well-developed.   HENT:      Head: Normocephalic.      Right Ear: Tympanic membrane, ear canal and external ear normal.      Left Ear: Tympanic membrane, ear canal and external ear normal.      Nose: Nose normal.      Mouth/Throat:      Mouth: Mucous membranes are moist.   Eyes:      Conjunctiva/sclera: Conjunctivae normal.   Neck:      Thyroid: No thyromegaly.      Vascular: No JVD.   Cardiovascular:      Rate and Rhythm: Normal rate and regular rhythm.      Heart sounds: Normal heart sounds.   Pulmonary:      Effort: Pulmonary effort is normal.      Breath sounds: Normal breath sounds.   Abdominal:      Palpations: Abdomen is soft.      Tenderness: There is no abdominal tenderness.   Musculoskeletal:      Right lower leg: No edema.      Left lower leg: No edema.   Lymphadenopathy:      Cervical: No cervical adenopathy.   Skin:     Findings: No rash.   Neurological:      Mental Status: He is alert and oriented to person, place, and time.   Psychiatric:         Mood and Affect: Mood normal.         Behavior: Behavior normal.         Assessment/Plan   Problem List Items Addressed This Visit          Medium    Type 2 diabetes mellitus without complication, with long-term current use of insulin (CMS/[facility]) - Primary    Relevant Orders    POCT glycosylated " hemoglobin (Hb A1C) manually resulted (Completed)    Referral to Ophthalmology    CBC and Auto Differential    Comprehensive Metabolic Panel    Lipid Panel    TSH with reflex to Free T4 if abnormal    Vitamin B12     Other Visit Diagnoses       Screening for colon cancer        Relevant Orders    Colonoscopy Screening; Average Risk Patient    CBC and Auto Differential    Comprehensive Metabolic Panel    Lipid Panel    TSH with reflex to Free T4 if abnormal    Vitamin B12    Personal history of nicotine dependence        Relevant Orders    CT lung screening low dose    CBC and Auto Differential    Comprehensive Metabolic Panel    Lipid Panel    TSH with reflex to Free T4 if abnormal    Vitamin B12    Screening for prostate cancer        Relevant Orders    CBC and Auto Differential    Comprehensive Metabolic Panel    Lipid Panel    TSH with reflex to Free T4 if abnormal    Prostate Specific Antigen, Screen    Vitamin B12    Routine general medical examination at health care facility            #1 preventative health  Continue healthy diet and regular exercise and weight loss  Tobacco cessation strongly recommended.  Ordered LDCT for lung cancer screening based on his tobacco history  He is due for screening colonoscopy this year.  This is ordered today  Due for next PSA in June.  Will order this with next labs and he will get this done prior to next appointment  Up-to-date on tetanus and pneumococcal vaccines.  Recommended getting the Shingrix vaccine at pharmacy or health department  2.  Diabetes mellitus: This is controlled with A1c 6.7%.  He has not been administering Lantus on a regular basis and has been keeping his home glucose readings under control with NovoLog sliding scale 3 times daily.  We will continue current medication and follow-up in 4 months to recheck A1c  3.  Hypertension: Blood pressure is at goal.  Continue current medications and follow-up in 4 months  4.  Hyperlipidemia: Last LDL was at goal.   Continue atorvastatin at current dose.  Will recheck labs including lipid panel prior to follow-up in 4 months  Continue to follow with nephrology annually for stage III chronic kidney disease.  His last GFR done January 2024 was 48

## 2024-03-05 NOTE — TELEPHONE ENCOUNTER
Do you happen to know the name of the kidney Dr that Diaz sees? I dont see anything in the chart for urology     Please Advise

## 2024-03-07 PROBLEM — E66.01 CLASS 2 SEVERE OBESITY WITH SERIOUS COMORBIDITY AND BODY MASS INDEX (BMI) OF 37.0 TO 37.9 IN ADULT (MULTI): Status: RESOLVED | Noted: 2023-01-18 | Resolved: 2024-03-07

## 2024-03-07 PROBLEM — E66.812 CLASS 2 SEVERE OBESITY WITH SERIOUS COMORBIDITY AND BODY MASS INDEX (BMI) OF 37.0 TO 37.9 IN ADULT: Status: RESOLVED | Noted: 2023-01-18 | Resolved: 2024-03-07

## 2024-03-18 ENCOUNTER — OFFICE VISIT (OUTPATIENT)
Dept: ORTHOPEDIC SURGERY | Facility: CLINIC | Age: 64
End: 2024-03-18
Payer: COMMERCIAL

## 2024-03-18 ENCOUNTER — HOSPITAL ENCOUNTER (OUTPATIENT)
Dept: RADIOLOGY | Facility: CLINIC | Age: 64
Discharge: HOME | End: 2024-03-18
Payer: COMMERCIAL

## 2024-03-18 DIAGNOSIS — S82.142S CLOSED FRACTURE OF LEFT TIBIAL PLATEAU, SEQUELA: ICD-10-CM

## 2024-03-18 PROCEDURE — 73560 X-RAY EXAM OF KNEE 1 OR 2: CPT | Mod: LT

## 2024-03-18 PROCEDURE — 73560 X-RAY EXAM OF KNEE 1 OR 2: CPT | Mod: LEFT SIDE | Performed by: STUDENT IN AN ORGANIZED HEALTH CARE EDUCATION/TRAINING PROGRAM

## 2024-03-18 PROCEDURE — 99213 OFFICE O/P EST LOW 20 MIN: CPT | Performed by: STUDENT IN AN ORGANIZED HEALTH CARE EDUCATION/TRAINING PROGRAM

## 2024-03-18 PROCEDURE — 4010F ACE/ARB THERAPY RXD/TAKEN: CPT | Performed by: STUDENT IN AN ORGANIZED HEALTH CARE EDUCATION/TRAINING PROGRAM

## 2024-03-18 NOTE — PROGRESS NOTES
Chief Complaint   Patient presents with    Left Knee - Follow-up     Minimally displaced tibial plateau fx from 10/18/23  5 months out  X-rays today       History of Present Illness  Patient returns today for 20 week follow up of left knee.  The patient notes improvement in pain with gentle return to everyday activities.  The patient denies any significant numbness or tingling or calf pain.    Doing well with no issues.      Physical Examination:  Left knee:  Skin healthy and intact  Swelling noted   Tenderness: mild   Intact flexion and extension of digits  Neurovascular exam normal distally  2+ dorsalis pedis pulse and good cap refill    Radiographs  XR knee left 1-2 views    Result Date: 1/12/2024  Interpreted By:  Ashvin Florentino III, STUDY: XR KNEE LEFT 1-2 VIEWS; ;  1/12/2024 11:17 am   INDICATION: Signs/Symptoms:pain.   COMPARISON: None.   ACCESSION NUMBER(S): [id number]   ORDERING CLINICIAN: ASHVIN FLORENTINO   FINDINGS: 2 weight-bearing views right knee: Healed tibial plateau fracture in near anatomic alignment. There is fracture line consolidation/obliteration consistent with a healed tibial plateau fracture. Maintained joint space within the mediolateral compartments of the knee.       Healed tibial plateau fracture     MACRO: None   Signed by: Ashvin Florentino III 1/12/2024 11:32 AM Dictation workstation:   [id number]      Assessment:  Patient with a  healed tibial plateau fracture    Plan:   Weight-bear as tolerated activities as tolerated using pain as a guide  Discussed full course of recovery can take upwards of 1 year  Discussed activities to avoid as well as importance of using pain as a guide  Discussed that he does have some findings consistent with posttraumatic arthritis if he does have worsening pain may benefit from an intra-articular injection      We discussed the importance of vitamins particularly the use of vitamin D to maximize nutritional environment for healing of fracture. Patient  agrees to optimize nutrition and diet to provide favorable environment for healing.

## 2024-03-21 ENCOUNTER — HOSPITAL ENCOUNTER (OUTPATIENT)
Dept: RADIOLOGY | Facility: CLINIC | Age: 64
Discharge: HOME | End: 2024-03-21
Payer: COMMERCIAL

## 2024-03-21 DIAGNOSIS — Z87.891 PERSONAL HISTORY OF NICOTINE DEPENDENCE: ICD-10-CM

## 2024-03-21 PROCEDURE — 71271 CT THORAX LUNG CANCER SCR C-: CPT

## 2024-03-27 DIAGNOSIS — Z79.4 TYPE 2 DIABETES MELLITUS WITHOUT COMPLICATION, WITH LONG-TERM CURRENT USE OF INSULIN (MULTI): Primary | ICD-10-CM

## 2024-03-27 DIAGNOSIS — E11.9 TYPE 2 DIABETES MELLITUS WITHOUT COMPLICATION, WITH LONG-TERM CURRENT USE OF INSULIN (MULTI): Primary | ICD-10-CM

## 2024-03-27 RX ORDER — PEN NEEDLE, DIABETIC 29 G X1/2"
1 NEEDLE, DISPOSABLE MISCELLANEOUS 3 TIMES DAILY
Qty: 100 EACH | Refills: 2 | Status: SHIPPED | OUTPATIENT
Start: 2024-03-27

## 2024-03-27 NOTE — TELEPHONE ENCOUNTER
"Rx Refill Request Telephone Encounter    Name:  Diaz Manuel  :  [id number]  Medication Name:  pen needle, diabetic 31 gauge x \" needle             Specific Pharmacy location:  [facility]  Date of last appointment:  na  Date of next appointment:  na  Best number to reach patient:  na            "

## 2024-03-28 DIAGNOSIS — I10 HYPERTENSION, UNSPECIFIED TYPE: ICD-10-CM

## 2024-03-28 RX ORDER — METOPROLOL TARTRATE 100 MG/1
50 TABLET ORAL 2 TIMES DAILY
Qty: 90 TABLET | Refills: 3 | Status: SHIPPED | OUTPATIENT
Start: 2024-03-28

## 2024-04-03 ENCOUNTER — TELEPHONE (OUTPATIENT)
Dept: PRIMARY CARE | Facility: CLINIC | Age: 64
End: 2024-04-03
Payer: COMMERCIAL

## 2024-04-03 NOTE — TELEPHONE ENCOUNTER
Patient called in stating that he has an upcoming colonoscopy that is scheduled for 04/08/24 in [place].  Patient said that he was told by [place] that he has to contact us to get his prep and send to his pharmacy.  Please advise. Phone #[phone].  Thank you!

## 2024-04-08 ENCOUNTER — HOSPITAL ENCOUNTER (OUTPATIENT)
Dept: GASTROENTEROLOGY | Facility: EXTERNAL LOCATION | Age: 64
Discharge: HOME | End: 2024-04-08
Payer: COMMERCIAL

## 2024-04-08 DIAGNOSIS — Z12.11 SCREENING FOR COLON CANCER: ICD-10-CM

## 2024-04-08 RX ORDER — SODIUM CHLORIDE 9 MG/ML
20 INJECTION, SOLUTION INTRAVENOUS CONTINUOUS
OUTPATIENT
Start: 2024-04-08

## 2024-04-08 RX ORDER — ONDANSETRON HYDROCHLORIDE 2 MG/ML
4 INJECTION, SOLUTION INTRAVENOUS ONCE AS NEEDED
OUTPATIENT
Start: 2024-04-08

## 2024-04-09 ENCOUNTER — TELEPHONE (OUTPATIENT)
Dept: PRIMARY CARE | Facility: CLINIC | Age: 64
End: 2024-04-09
Payer: COMMERCIAL

## 2024-04-09 NOTE — TELEPHONE ENCOUNTER
He is asking for another voucher for eye glasses. He misplaced the one he had during moving    Please Advise

## 2024-04-10 ENCOUNTER — ANESTHESIA EVENT (OUTPATIENT)
Dept: GASTROENTEROLOGY | Facility: EXTERNAL LOCATION | Age: 64
End: 2024-04-10

## 2024-04-15 ENCOUNTER — TELEPHONE (OUTPATIENT)
Dept: PRIMARY CARE | Facility: CLINIC | Age: 64
End: 2024-04-15
Payer: COMMERCIAL

## 2024-04-15 NOTE — TELEPHONE ENCOUNTER
He recently moved/ His Rx is with a mail in Pharm. Does he need to change his address with them.. or will it be updated with the address we have in the system?      Please Advise

## 2024-04-22 ENCOUNTER — APPOINTMENT (OUTPATIENT)
Dept: GASTROENTEROLOGY | Facility: EXTERNAL LOCATION | Age: 64
End: 2024-04-22
Payer: COMMERCIAL

## 2024-04-22 ENCOUNTER — ANESTHESIA (OUTPATIENT)
Dept: GASTROENTEROLOGY | Facility: EXTERNAL LOCATION | Age: 64
End: 2024-04-22

## 2024-04-30 ENCOUNTER — APPOINTMENT (OUTPATIENT)
Dept: GASTROENTEROLOGY | Facility: EXTERNAL LOCATION | Age: 64
End: 2024-04-30
Payer: COMMERCIAL

## 2024-05-07 ENCOUNTER — APPOINTMENT (OUTPATIENT)
Dept: GASTROENTEROLOGY | Facility: EXTERNAL LOCATION | Age: 64
End: 2024-05-07
Payer: COMMERCIAL

## 2024-05-16 ENCOUNTER — TELEPHONE (OUTPATIENT)
Dept: PRIMARY CARE | Facility: CLINIC | Age: 64
End: 2024-05-16
Payer: COMMERCIAL

## 2024-05-16 NOTE — TELEPHONE ENCOUNTER
Patient is requesting a chart that explains when he is testing  his sugar what his suger should be at and if it is high what to do.    He does not know what it is called. He has machine,   Mail it to him @  [address] [place] [id number]

## 2024-06-10 ENCOUNTER — TELEPHONE (OUTPATIENT)
Dept: PRIMARY CARE | Facility: CLINIC | Age: 64
End: 2024-06-10

## 2024-06-10 ENCOUNTER — APPOINTMENT (OUTPATIENT)
Dept: GASTROENTEROLOGY | Facility: EXTERNAL LOCATION | Age: 64
End: 2024-06-10
Payer: COMMERCIAL

## 2024-06-11 ENCOUNTER — TELEPHONE (OUTPATIENT)
Dept: PRIMARY CARE | Facility: CLINIC | Age: 64
End: 2024-06-11

## 2024-06-11 ENCOUNTER — APPOINTMENT (OUTPATIENT)
Dept: GASTROENTEROLOGY | Facility: EXTERNAL LOCATION | Age: 64
End: 2024-06-11
Payer: COMMERCIAL

## 2024-06-11 DIAGNOSIS — Z12.11 COLON CANCER SCREENING: ICD-10-CM

## 2024-06-11 DIAGNOSIS — Z12.11 SCREENING FOR COLON CANCER: Primary | ICD-10-CM

## 2024-06-11 RX ORDER — POLYETHYLENE GLYCOL 3350, SODIUM CHLORIDE, SODIUM BICARBONATE AND POTASSIUM CHLORIDE 420; 5.72; 11.2; 1.48 G/4L; G/4L; G/4L; G/4L
4000 POWDER, FOR SOLUTION ORAL ONCE
Qty: 4000 ML | Refills: 0 | Status: SHIPPED | OUTPATIENT
Start: 2024-06-11 | End: 2024-06-11

## 2024-06-11 NOTE — TELEPHONE ENCOUNTER
Patient states that [facility] wont see him for his colonoscopy (He cancelled x2 due to missing the prep)    He  needs a new referral put in so he can schedule at a different office    Please Advise    [phone]

## 2024-06-13 ENCOUNTER — LAB (OUTPATIENT)
Dept: LAB | Facility: LAB | Age: 64
End: 2024-06-13
Payer: COMMERCIAL

## 2024-06-13 DIAGNOSIS — Z12.5 SCREENING FOR PROSTATE CANCER: ICD-10-CM

## 2024-06-13 DIAGNOSIS — E11.9 TYPE 2 DIABETES MELLITUS WITHOUT COMPLICATION, WITH LONG-TERM CURRENT USE OF INSULIN (MULTI): ICD-10-CM

## 2024-06-13 DIAGNOSIS — Z87.891 PERSONAL HISTORY OF NICOTINE DEPENDENCE: ICD-10-CM

## 2024-06-13 DIAGNOSIS — Z12.11 SCREENING FOR COLON CANCER: ICD-10-CM

## 2024-06-13 DIAGNOSIS — Z79.4 TYPE 2 DIABETES MELLITUS WITHOUT COMPLICATION, WITH LONG-TERM CURRENT USE OF INSULIN (MULTI): ICD-10-CM

## 2024-06-13 LAB
ALBUMIN SERPL BCP-MCNC: 4.5 G/DL (ref 3.4–5)
ALP SERPL-CCNC: 69 U/L (ref 33–136)
ALT SERPL W P-5'-P-CCNC: 14 U/L (ref 10–52)
ANION GAP SERPL CALC-SCNC: 13 MMOL/L (ref 10–20)
AST SERPL W P-5'-P-CCNC: 14 U/L (ref 9–39)
BASOPHILS # BLD AUTO: 0.08 X10*3/UL (ref 0–0.1)
BASOPHILS NFR BLD AUTO: 0.9 %
BILIRUB SERPL-MCNC: 0.4 MG/DL (ref 0–1.2)
BUN SERPL-MCNC: 31 MG/DL (ref 6–23)
CALCIUM SERPL-MCNC: 9.2 MG/DL (ref 8.6–10.3)
CHLORIDE SERPL-SCNC: 102 MMOL/L (ref 98–107)
CHOLEST SERPL-MCNC: 116 MG/DL (ref 0–199)
CHOLESTEROL/HDL RATIO: 5
CO2 SERPL-SCNC: 25 MMOL/L (ref 21–32)
CREAT SERPL-MCNC: 1.68 MG/DL (ref 0.5–1.3)
EGFRCR SERPLBLD CKD-EPI 2021: 46 ML/MIN/1.73M*2
EOSINOPHIL # BLD AUTO: 0.62 X10*3/UL (ref 0–0.7)
EOSINOPHIL NFR BLD AUTO: 6.8 %
ERYTHROCYTE [DISTWIDTH] IN BLOOD BY AUTOMATED COUNT: 13.2 % (ref 11.5–14.5)
GLUCOSE SERPL-MCNC: 117 MG/DL (ref 74–99)
HCT VFR BLD AUTO: 42.3 % (ref 41–52)
HDLC SERPL-MCNC: 23.2 MG/DL
HGB BLD-MCNC: 13.7 G/DL (ref 13.5–17.5)
IMM GRANULOCYTES # BLD AUTO: 0.04 X10*3/UL (ref 0–0.7)
IMM GRANULOCYTES NFR BLD AUTO: 0.4 % (ref 0–0.9)
LDLC SERPL CALC-MCNC: ABNORMAL MG/DL
LYMPHOCYTES # BLD AUTO: 2.81 X10*3/UL (ref 1.2–4.8)
LYMPHOCYTES NFR BLD AUTO: 30.9 %
MCH RBC QN AUTO: 31.1 PG (ref 26–34)
MCHC RBC AUTO-ENTMCNC: 32.4 G/DL (ref 32–36)
MCV RBC AUTO: 96 FL (ref 80–100)
MONOCYTES # BLD AUTO: 0.63 X10*3/UL (ref 0.1–1)
MONOCYTES NFR BLD AUTO: 6.9 %
NEUTROPHILS # BLD AUTO: 4.91 X10*3/UL (ref 1.2–7.7)
NEUTROPHILS NFR BLD AUTO: 54.1 %
NON HDL CHOLESTEROL: 93 MG/DL (ref 0–149)
NRBC BLD-RTO: 0 /100 WBCS (ref 0–0)
PLATELET # BLD AUTO: 176 X10*3/UL (ref 150–450)
POTASSIUM SERPL-SCNC: 4.8 MMOL/L (ref 3.5–5.3)
PROT SERPL-MCNC: 7.4 G/DL (ref 6.4–8.2)
PSA SERPL-MCNC: 1.86 NG/ML
RBC # BLD AUTO: 4.4 X10*6/UL (ref 4.5–5.9)
SODIUM SERPL-SCNC: 135 MMOL/L (ref 136–145)
TRIGL SERPL-MCNC: 540 MG/DL (ref 0–149)
TSH SERPL-ACNC: 0.99 MIU/L (ref 0.44–3.98)
VIT B12 SERPL-MCNC: 792 PG/ML (ref 211–911)
VLDL: ABNORMAL
WBC # BLD AUTO: 9.1 X10*3/UL (ref 4.4–11.3)

## 2024-06-13 PROCEDURE — 36415 COLL VENOUS BLD VENIPUNCTURE: CPT

## 2024-06-13 PROCEDURE — 80061 LIPID PANEL: CPT

## 2024-06-13 PROCEDURE — 84443 ASSAY THYROID STIM HORMONE: CPT

## 2024-06-13 PROCEDURE — 85025 COMPLETE CBC W/AUTO DIFF WBC: CPT

## 2024-06-13 PROCEDURE — 80053 COMPREHEN METABOLIC PANEL: CPT

## 2024-06-13 PROCEDURE — 82607 VITAMIN B-12: CPT

## 2024-06-13 PROCEDURE — G0103 PSA SCREENING: HCPCS

## 2024-06-18 ENCOUNTER — TELEPHONE (OUTPATIENT)
Dept: PRIMARY CARE | Facility: CLINIC | Age: 64
End: 2024-06-18
Payer: COMMERCIAL

## 2024-06-18 NOTE — TELEPHONE ENCOUNTER
Patient would like you to call and go over lab results when you have a chance.  Phone # [phone].  Thank you!

## 2024-07-08 ENCOUNTER — APPOINTMENT (OUTPATIENT)
Dept: PRIMARY CARE | Facility: CLINIC | Age: 64
End: 2024-07-08
Payer: COMMERCIAL

## 2024-07-08 VITALS
WEIGHT: 235 LBS | SYSTOLIC BLOOD PRESSURE: 118 MMHG | DIASTOLIC BLOOD PRESSURE: 74 MMHG | HEART RATE: 67 BPM | OXYGEN SATURATION: 95 % | BODY MASS INDEX: 37.93 KG/M2 | TEMPERATURE: 97.8 F

## 2024-07-08 DIAGNOSIS — E11.9 TYPE 2 DIABETES MELLITUS WITHOUT COMPLICATION, WITH LONG-TERM CURRENT USE OF INSULIN (MULTI): Primary | ICD-10-CM

## 2024-07-08 DIAGNOSIS — I10 HYPERTENSION, UNSPECIFIED TYPE: ICD-10-CM

## 2024-07-08 DIAGNOSIS — E78.5 HYPERLIPIDEMIA, UNSPECIFIED HYPERLIPIDEMIA TYPE: ICD-10-CM

## 2024-07-08 DIAGNOSIS — Z79.4 TYPE 2 DIABETES MELLITUS WITHOUT COMPLICATION, WITH LONG-TERM CURRENT USE OF INSULIN (MULTI): Primary | ICD-10-CM

## 2024-07-08 LAB — POC HEMOGLOBIN A1C: 6.6 % (ref 4.2–6.5)

## 2024-07-08 PROCEDURE — 3078F DIAST BP <80 MM HG: CPT | Performed by: FAMILY MEDICINE

## 2024-07-08 PROCEDURE — 99214 OFFICE O/P EST MOD 30 MIN: CPT | Performed by: FAMILY MEDICINE

## 2024-07-08 PROCEDURE — 3074F SYST BP LT 130 MM HG: CPT | Performed by: FAMILY MEDICINE

## 2024-07-08 PROCEDURE — 4010F ACE/ARB THERAPY RXD/TAKEN: CPT | Performed by: FAMILY MEDICINE

## 2024-07-08 PROCEDURE — 83036 HEMOGLOBIN GLYCOSYLATED A1C: CPT | Performed by: FAMILY MEDICINE

## 2024-07-08 RX ORDER — ICOSAPENT ETHYL 1 G/1
2 CAPSULE ORAL
Qty: 120 CAPSULE | Refills: 3 | Status: SHIPPED | OUTPATIENT
Start: 2024-07-08

## 2024-07-08 ASSESSMENT — ENCOUNTER SYMPTOMS
SHORTNESS OF BREATH: 0
NUMBNESS: 0
DIABETIC ASSOCIATED SYMPTOMS: 0
FEVER: 0
COUGH: 0
DIZZINESS: 0

## 2024-07-08 NOTE — PROGRESS NOTES
Subjective   Patient ID: Diaz Manuel is a 63 y.o. male who presents for Diabetes.    Diabetes  He presents for his follow-up diabetic visit. He has type 2 diabetes mellitus. There are no hypoglycemic associated symptoms. Pertinent negatives for hypoglycemia include no dizziness. There are no diabetic associated symptoms. Pertinent negatives for diabetes include no chest pain. There are no hypoglycemic complications. He is following a generally healthy diet. His bedtime blood glucose range is generally 110-130 mg/dl. His overall blood glucose range is 140-180 mg/dl. Eye exam is current.   Here today for 4-month follow-up  Diabetes mellitus: Currently takes NovoLog sliding scale 3 times daily (2 units for every 50 glucose greater than 150).  He had previously been on daily Lantus, but has not been using recently due to good control  Checks glucose out of the office and average has been 128.  No hypoglycemia.  Up-to-date with eye exams.  No blurred vision or paresthesias in extremities  Hypertension: Currently takes spironolactone, metoprolol, amlodipine, chlorthalidone and lisinopril.  No elevated BP readings out of office  He has a history of hyperlipidemia currently taking atorvastatin 40 mg daily and over-the-counter omega-3 fish oils  His most recent lipid panel checked 6/13/2024 showed total cholesterol 116.  LDL unable to calculate.  Triglycerides elevated at 540      Review of Systems   Constitutional:  Negative for fever.   Respiratory:  Negative for cough and shortness of breath.    Cardiovascular:  Negative for chest pain.   Neurological:  Negative for dizziness and numbness.       Objective   /74   Pulse 67   Temp 36.6 °C (97.8 °F) (Temporal)   Wt 107 kg (235 lb)   SpO2 95%   BMI 37.93 kg/m²     Physical Exam  Vitals reviewed.   Constitutional:       General: He is not in acute distress.     Appearance: Normal appearance. He is well-developed.   HENT:      Head: Normocephalic.   Eyes:       Conjunctiva/sclera: Conjunctivae normal.   Cardiovascular:      Rate and Rhythm: Normal rate and regular rhythm.      Heart sounds: Normal heart sounds.   Pulmonary:      Effort: Pulmonary effort is normal.      Breath sounds: Normal breath sounds.   Musculoskeletal:      Right lower leg: No edema.      Left lower leg: No edema.   Skin:     Findings: No rash.   Neurological:      Mental Status: He is alert.   Psychiatric:         Mood and Affect: Mood normal.         Behavior: Behavior normal.         Assessment/Plan   Problem List Items Addressed This Visit          Medium    Hyperlipidemia    Relevant Medications    icosapent ethyL (Vascepa) 1 gram capsule    Other Relevant Orders    Lipid Panel    Hypertension    Type 2 diabetes mellitus without complication, with long-term current use of insulin (Multi) - Primary    Relevant Orders    POCT glycosylated hemoglobin (Hb A1C) manually resulted (Completed)   #1 diabetes mellitus: This is controlled with A1c of 6.6% today.  Continue NovoLog sliding scale 3 times daily and follow-up in 4 months for recheck  2.  Hypertension: Stable with blood pressure 118/74.  Continue current medications  Hyperlipidemia: We reviewed his most recent lipid panel which showed triglyceride level elevated at 540.  We will stop over-the-counter omega-3 fish oils and start Vascepa 2 g twice daily.  Continue atorvastatin.  Avoid concentrated sugars and saturated fats in diet.  Recheck lipid panel in 2 months  Follow-up in 4 months for recheck

## 2024-08-20 ENCOUNTER — TELEPHONE (OUTPATIENT)
Dept: PRIMARY CARE | Facility: CLINIC | Age: 64
End: 2024-08-20
Payer: COMMERCIAL

## 2024-08-20 DIAGNOSIS — I10 HYPERTENSION, UNSPECIFIED TYPE: Primary | ICD-10-CM

## 2024-08-20 RX ORDER — SPIRONOLACTONE 25 MG/1
25 TABLET ORAL DAILY
Qty: 90 TABLET | Refills: 3 | Status: SHIPPED | OUTPATIENT
Start: 2024-08-20

## 2024-08-20 NOTE — TELEPHONE ENCOUNTER
Rx Refill Request Telephone Encounter    Name:  Diaz Manuel  :  [id number]  Medication Name:  Spironolactone (Aldactone) 25 mg   25 mg   oral   Daily      Specific Pharmacy location:  [facility]  Date of last appointment:  na  Date of next appointment:  na  Best number to reach patient:  [phone]

## 2024-08-23 DIAGNOSIS — I10 HYPERTENSION, UNSPECIFIED TYPE: ICD-10-CM

## 2024-08-23 PROBLEM — R45.4 IRRITABLE MOOD: Status: ACTIVE | Noted: 2024-08-23

## 2024-08-23 PROBLEM — B35.1 ONYCHOMYCOSIS: Status: ACTIVE | Noted: 2024-08-23

## 2024-08-23 PROBLEM — R94.31 ABNORMAL ELECTROCARDIOGRAPHY: Status: ACTIVE | Noted: 2024-08-23

## 2024-08-23 PROBLEM — J06.9 UPPER RESPIRATORY TRACT INFECTION: Status: ACTIVE | Noted: 2024-08-23

## 2024-08-23 PROBLEM — R22.41 SUBCUTANEOUS MASS OF RIGHT LOWER EXTREMITY: Status: ACTIVE | Noted: 2024-08-23

## 2024-08-23 PROBLEM — S37.009A INJURY OF KIDNEY: Status: ACTIVE | Noted: 2024-08-23

## 2024-08-23 PROBLEM — M79.606 PAIN OF LOWER EXTREMITY: Status: ACTIVE | Noted: 2024-08-23

## 2024-08-23 PROBLEM — L98.9 SKIN LESION OF SCALP: Status: ACTIVE | Noted: 2024-08-23

## 2024-08-23 PROBLEM — M54.2 NECK PAIN: Status: ACTIVE | Noted: 2024-08-23

## 2024-08-23 PROBLEM — D69.6 LOW PLATELET COUNT (CMS-HCC): Status: ACTIVE | Noted: 2024-08-23

## 2024-08-23 RX ORDER — CHLORTHALIDONE 25 MG/1
25 TABLET ORAL DAILY
Qty: 90 TABLET | Refills: 3 | Status: SHIPPED | OUTPATIENT
Start: 2024-08-23

## 2024-08-26 DIAGNOSIS — Z79.4 TYPE 2 DIABETES MELLITUS WITHOUT COMPLICATION, WITH LONG-TERM CURRENT USE OF INSULIN (MULTI): ICD-10-CM

## 2024-08-26 DIAGNOSIS — E11.9 TYPE 2 DIABETES MELLITUS WITHOUT COMPLICATION, WITH LONG-TERM CURRENT USE OF INSULIN (MULTI): ICD-10-CM

## 2024-08-26 RX ORDER — DEXTROSE 4 G
1 TABLET,CHEWABLE ORAL DAILY
Qty: 1 EACH | Refills: 0 | Status: SHIPPED | OUTPATIENT
Start: 2024-08-26

## 2024-08-26 RX ORDER — DEXTROSE 4 G
1 TABLET,CHEWABLE ORAL DAILY
COMMUNITY
End: 2024-08-26 | Stop reason: SDUPTHER

## 2024-08-26 RX ORDER — BLOOD SUGAR DIAGNOSTIC
STRIP MISCELLANEOUS
Qty: 300 STRIP | Refills: 3 | Status: SHIPPED | OUTPATIENT
Start: 2024-08-26

## 2024-09-06 ENCOUNTER — LAB (OUTPATIENT)
Dept: LAB | Facility: LAB | Age: 64
End: 2024-09-06
Payer: COMMERCIAL

## 2024-09-06 DIAGNOSIS — E78.5 HYPERLIPIDEMIA, UNSPECIFIED HYPERLIPIDEMIA TYPE: ICD-10-CM

## 2024-09-06 LAB
CHOLEST SERPL-MCNC: 99 MG/DL (ref 0–199)
CHOLESTEROL/HDL RATIO: 4.4
HDLC SERPL-MCNC: 22.4 MG/DL
LDLC SERPL CALC-MCNC: 1 MG/DL
NON HDL CHOLESTEROL: 77 MG/DL (ref 0–149)
TRIGL SERPL-MCNC: 378 MG/DL (ref 0–149)
VLDL: 76 MG/DL (ref 0–40)

## 2024-09-06 PROCEDURE — 80061 LIPID PANEL: CPT

## 2024-09-06 PROCEDURE — 36415 COLL VENOUS BLD VENIPUNCTURE: CPT

## 2024-09-11 ENCOUNTER — TELEPHONE (OUTPATIENT)
Dept: PRIMARY CARE | Facility: CLINIC | Age: 64
End: 2024-09-11
Payer: COMMERCIAL

## 2024-09-11 ENCOUNTER — ANESTHESIA (OUTPATIENT)
Dept: GASTROENTEROLOGY | Facility: HOSPITAL | Age: 64
End: 2024-09-11

## 2024-09-11 ENCOUNTER — APPOINTMENT (OUTPATIENT)
Dept: GASTROENTEROLOGY | Facility: HOSPITAL | Age: 64
End: 2024-09-11
Payer: COMMERCIAL

## 2024-09-11 ENCOUNTER — ANESTHESIA EVENT (OUTPATIENT)
Dept: GASTROENTEROLOGY | Facility: HOSPITAL | Age: 64
End: 2024-09-11

## 2024-09-11 SDOH — HEALTH STABILITY: MENTAL HEALTH: CURRENT SMOKER: 1

## 2024-09-11 NOTE — ANESTHESIA PREPROCEDURE EVALUATION
Diaz Manuel is a 64 y.o. male here for:    Colonoscopy  With Joe Valle MD  Screening for colon cancer    Relevant Problems   Cardiac   (+) Abnormal electrocardiography   (+) Hyperlipidemia   (+) Hypertension      Neuro   (+) CVA (cerebral vascular accident) (Multi)      Endocrine   (+) Type 2 diabetes mellitus without complication, with long-term current use of insulin (Multi)      ID   (+) Upper respiratory tract infection      Genitourinary   (+) Stage 3a chronic kidney disease (Multi)       Lab Results   Component Value Date    HGB 13.7 06/13/2024    HCT 42.3 06/13/2024    WBC 9.1 06/13/2024     06/13/2024     (L) 06/13/2024    K 4.8 06/13/2024     06/13/2024    CREATININE 1.68 (H) 06/13/2024    BUN 31 (H) 06/13/2024     EKG 2020:  Normal sinus rhythm  Leftward axis  Right bundle branch block  Cannot rule out Septal infarct (cited on or before 21-SEP-2019)  Nonspecific ST abnormality  Abnormal ECG    Social History     Tobacco Use   Smoking Status Every Day    Current packs/day: 1.00    Types: Cigarettes   Smokeless Tobacco Never       Allergies   Allergen Reactions    Cefaclor Unknown     Does not remember       Current Outpatient Medications   Medication Instructions    albuterol 90 mcg/actuation inhaler 1-2 puffs, inhalation, Inhale 1-2 puff by mouth 4 to 6 hours if needed    alcohol swabs pads, medicated 1 Pad, topical (top), 3 times daily    amLODIPine (NORVASC) 10 mg, oral, Daily, For blood pressure    aspirin 81 mg, oral, Nightly    atorvastatin (LIPITOR) 40 mg, oral, Daily    blood-glucose meter misc 1 Device, miscellaneous, Daily    chlorthalidone (HYGROTON) 25 mg, oral, Daily    cyanocobalamin (VITAMIN B-12) 100 mcg, oral, Daily    diclofenac sodium (Voltaren) 1 % gel gel 1 Application, Topical, 2 times daily    diclofenac sodium (Voltaren) 1 % gel gel 1 Application, Topical, 2 times daily    FreeStyle glucose monitoring kit 1 each, miscellaneous, 3 times daily, Use as directed  "   icosapent ethyL (VASCEPA) 2 g, oral, 2 times daily (morning and late afternoon)    insulin glargine (LANTUS) 40 Units, subcutaneous, Inject 40 units daily    insulin lispro (HUMALOG) 6-12 Units, subcutaneous, 3 times daily (morning, midday, late afternoon), Inject TID per sliding scale instructions    insulin lispro protamin-lispro (HumaLOG Mix 50-50 KwikPen) 100 unit/mL (50-50) injection 6-12 Units, subcutaneous, See admin instructions, Inject 6-12 units per sliding scale daily. <150 6 units, 150-200 8 units, 201-250 10 units, 251-300 12 units.    lancets 33 gauge misc Use four times daily    lisinopril 40 mg, oral, Daily    metoprolol tartrate (LOPRESSOR) 50 mg, oral, 2 times daily, TAKE 1/2 TABLET BY MOUTH TWICE A DAILY    OneTouch Ultra Test strip Test 3 times dialy    pen needle, diabetic 31 gauge x 1/4\" needle 1 Pen needle, miscellaneous, 3 times daily    spironolactone (ALDACTONE) 25 mg, oral, Daily    triamcinolone (Kenalog) 0.1 % cream Topical, Apply 2-3 times daily to affected area(s)       Past Surgical History:   Procedure Laterality Date    OTHER SURGICAL HISTORY  09/25/2019    No history of surgery       Family History   Problem Relation Name Age of Onset    Heart failure Mother      Other (black lung) Father         NPO Details:  No data recorded    Physical Exam    Anesthesia Plan    History of general anesthesia?: yes  History of complications of general anesthesia?: no    ASA 3     MAC     The patient is a current smoker.  Patient was previously instructed to abstain from smoking on day of procedure.  Patient did not smoke on day of procedure.    intravenous induction   Anesthetic plan and risks discussed with patient.        "

## 2024-09-11 NOTE — TELEPHONE ENCOUNTER
Patient's sister called stating that he was supposed to have a colonoscopy this morning, however he didn't take his prep medications properly.  He is inquiring if Dr. Mesa can just put in an order for Cologuard.  Please advise.    Phone # [phone].

## 2024-09-17 ENCOUNTER — TELEPHONE (OUTPATIENT)
Dept: SURGERY | Facility: CLINIC | Age: 64
End: 2024-09-17
Payer: COMMERCIAL

## 2024-10-03 DIAGNOSIS — I10 HYPERTENSION, UNSPECIFIED TYPE: ICD-10-CM

## 2024-10-03 RX ORDER — LISINOPRIL 40 MG/1
40 TABLET ORAL DAILY
Qty: 90 TABLET | Refills: 3 | Status: SHIPPED | OUTPATIENT
Start: 2024-10-03

## 2024-10-08 ENCOUNTER — CLINICAL SUPPORT (OUTPATIENT)
Dept: PREADMISSION TESTING | Facility: HOSPITAL | Age: 64
End: 2024-10-08
Payer: COMMERCIAL

## 2024-10-08 VITALS — HEIGHT: 67 IN | BODY MASS INDEX: 36.88 KG/M2 | WEIGHT: 235 LBS

## 2024-10-08 NOTE — PREPROCEDURE INSTRUCTIONS

## 2024-10-14 ENCOUNTER — ANESTHESIA EVENT (OUTPATIENT)
Dept: GASTROENTEROLOGY | Facility: HOSPITAL | Age: 64
End: 2024-10-14
Payer: COMMERCIAL

## 2024-10-14 SDOH — HEALTH STABILITY: MENTAL HEALTH: CURRENT SMOKER: 1

## 2024-10-14 NOTE — ANESTHESIA PREPROCEDURE EVALUATION
Diaz Manuel is a 64 y.o. male here for:    Colonoscopy  With Jaren ELLINGTON MD  Screening for colon cancer    Relevant Problems   Cardiac   (+) Abnormal electrocardiography   (+) Hyperlipidemia   (+) Hypertension      Neuro   (+) CVA (cerebral vascular accident) (Multi)      Endocrine   (+) Type 2 diabetes mellitus without complication, with long-term current use of insulin (Multi)      HEENT   (+) Hearing loss      ID   (+) Onychomycosis   (+) Upper respiratory tract infection      Skin   (+) Eczema       Lab Results   Component Value Date    HGB 13.7 06/13/2024    HCT 42.3 06/13/2024    WBC 9.1 06/13/2024     06/13/2024     (L) 06/13/2024    K 4.8 06/13/2024     06/13/2024    CREATININE 1.68 (H) 06/13/2024    BUN 31 (H) 06/13/2024       Social History     Tobacco Use   Smoking Status Every Day    Current packs/day: 1.00    Types: Cigarettes   Smokeless Tobacco Never       Allergies   Allergen Reactions    Cefaclor Unknown     Does not remember       Current Outpatient Medications   Medication Instructions    albuterol 90 mcg/actuation inhaler 1-2 puffs, inhalation, Inhale 1-2 puff by mouth 4 to 6 hours if needed    alcohol swabs pads, medicated 1 Pad, topical (top), 3 times daily    amLODIPine (NORVASC) 10 mg, oral, Daily, For blood pressure    aspirin 81 mg, oral, Nightly    atorvastatin (LIPITOR) 40 mg, oral, Daily    blood-glucose meter misc 1 Device, miscellaneous, Daily    chlorthalidone (HYGROTON) 25 mg, oral, Daily    cyanocobalamin (VITAMIN B-12) 100 mcg, oral, Daily    diclofenac sodium (Voltaren) 1 % gel gel 1 Application, Topical, 2 times daily    diclofenac sodium (Voltaren) 1 % gel gel 1 Application, Topical, 2 times daily    FreeStyle glucose monitoring kit 1 each, miscellaneous, 3 times daily, Use as directed    icosapent ethyL (VASCEPA) 2 g, oral, 2 times daily (morning and late afternoon)    insulin glargine (LANTUS) 40 Units, subcutaneous, Every morning, Inject 40 units daily  "in the morning     insulin lispro protamin-lispro (HumaLOG Mix 50-50 KwikPen) 100 unit/mL (50-50) injection 6-12 Units, subcutaneous, See admin instructions, Inject 6-12 units per sliding scale daily. <150 6 units, 150-200 8 units, 201-250 10 units, 251-300 12 units.    lancets 33 gauge misc Use four times daily    lisinopril 40 mg, oral, Daily    metoprolol tartrate (LOPRESSOR) 50 mg, oral, 2 times daily, TAKE 1/2 TABLET BY MOUTH TWICE A DAILY    OneTouch Ultra Test strip Test 3 times dialy    pen needle, diabetic 31 gauge x 1/4\" needle 1 Pen needle, miscellaneous, 3 times daily    spironolactone (ALDACTONE) 25 mg, oral, Daily    triamcinolone (Kenalog) 0.1 % cream Topical, Apply 2-3 times daily to affected area(s)       Past Surgical History:   Procedure Laterality Date    COLONOSCOPY         Family History   Problem Relation Name Age of Onset    Heart failure Mother      Other (black lung) Father         NPO Details:  No data recorded    Physical Exam    Airway  Mallampati: II  TM distance: >3 FB  Neck ROM: full     Cardiovascular - normal exam     Dental - normal exam     Pulmonary - normal exam     Abdominal - normal exam           Anesthesia Plan    History of general anesthesia?: yes  History of complications of general anesthesia?: no    ASA 3     MAC     The patient is a current smoker.  Patient was previously instructed to abstain from smoking on day of procedure.  Patient did not smoke on day of procedure.    intravenous induction   Anesthetic plan and risks discussed with patient.    Plan discussed with CRNA and attending.       "

## 2024-10-15 ENCOUNTER — ANESTHESIA (OUTPATIENT)
Dept: GASTROENTEROLOGY | Facility: HOSPITAL | Age: 64
End: 2024-10-15
Payer: COMMERCIAL

## 2024-10-15 ENCOUNTER — HOSPITAL ENCOUNTER (OUTPATIENT)
Dept: GASTROENTEROLOGY | Facility: HOSPITAL | Age: 64
Discharge: HOME | End: 2024-10-15
Payer: COMMERCIAL

## 2024-10-15 VITALS
RESPIRATION RATE: 18 BRPM | BODY MASS INDEX: 35.5 KG/M2 | WEIGHT: 226.19 LBS | OXYGEN SATURATION: 96 % | DIASTOLIC BLOOD PRESSURE: 57 MMHG | TEMPERATURE: 96.8 F | HEART RATE: 55 BPM | SYSTOLIC BLOOD PRESSURE: 99 MMHG | HEIGHT: 67 IN

## 2024-10-15 DIAGNOSIS — Z12.11 SCREENING FOR COLON CANCER: ICD-10-CM

## 2024-10-15 LAB — GLUCOSE BLD MANUAL STRIP-MCNC: 132 MG/DL (ref 74–99)

## 2024-10-15 PROCEDURE — 3700000002 HC GENERAL ANESTHESIA TIME - EACH INCREMENTAL 1 MINUTE

## 2024-10-15 PROCEDURE — 45378 DIAGNOSTIC COLONOSCOPY: CPT | Performed by: SURGERY

## 2024-10-15 PROCEDURE — G0121 COLON CA SCRN NOT HI RSK IND: HCPCS | Performed by: SURGERY

## 2024-10-15 PROCEDURE — 82947 ASSAY GLUCOSE BLOOD QUANT: CPT

## 2024-10-15 PROCEDURE — 7100000010 HC PHASE TWO TIME - EACH INCREMENTAL 1 MINUTE

## 2024-10-15 PROCEDURE — 3700000001 HC GENERAL ANESTHESIA TIME - INITIAL BASE CHARGE

## 2024-10-15 PROCEDURE — 2500000004 HC RX 250 GENERAL PHARMACY W/ HCPCS (ALT 636 FOR OP/ED): Performed by: ANESTHESIOLOGY

## 2024-10-15 PROCEDURE — 7100000009 HC PHASE TWO TIME - INITIAL BASE CHARGE

## 2024-10-15 RX ORDER — LIDOCAINE HYDROCHLORIDE 20 MG/ML
INJECTION, SOLUTION EPIDURAL; INFILTRATION; INTRACAUDAL; PERINEURAL AS NEEDED
Status: DISCONTINUED | OUTPATIENT
Start: 2024-10-15 | End: 2024-10-15

## 2024-10-15 RX ORDER — PHENYLEPHRINE HCL IN 0.9% NACL 1 MG/10 ML
SYRINGE (ML) INTRAVENOUS AS NEEDED
Status: DISCONTINUED | OUTPATIENT
Start: 2024-10-15 | End: 2024-10-15

## 2024-10-15 RX ORDER — GLYCOPYRROLATE 0.2 MG/ML
INJECTION INTRAMUSCULAR; INTRAVENOUS AS NEEDED
Status: DISCONTINUED | OUTPATIENT
Start: 2024-10-15 | End: 2024-10-15

## 2024-10-15 RX ORDER — PROPOFOL 10 MG/ML
INJECTION, EMULSION INTRAVENOUS AS NEEDED
Status: DISCONTINUED | OUTPATIENT
Start: 2024-10-15 | End: 2024-10-15

## 2024-10-15 ASSESSMENT — PAIN - FUNCTIONAL ASSESSMENT
PAIN_FUNCTIONAL_ASSESSMENT: 0-10

## 2024-10-15 ASSESSMENT — COLUMBIA-SUICIDE SEVERITY RATING SCALE - C-SSRS
1. IN THE PAST MONTH, HAVE YOU WISHED YOU WERE DEAD OR WISHED YOU COULD GO TO SLEEP AND NOT WAKE UP?: NO
6. HAVE YOU EVER DONE ANYTHING, STARTED TO DO ANYTHING, OR PREPARED TO DO ANYTHING TO END YOUR LIFE?: NO
2. HAVE YOU ACTUALLY HAD ANY THOUGHTS OF KILLING YOURSELF?: NO

## 2024-10-15 ASSESSMENT — PAIN SCALES - GENERAL
PAINLEVEL_OUTOF10: 0 - NO PAIN

## 2024-10-15 NOTE — Clinical Note
Huddle and Timeout completed together with team. Patient wristband and JAROCHO information verified.  Anesthesia safety check completed

## 2024-10-15 NOTE — ANESTHESIA POSTPROCEDURE EVALUATION
Patient: Diaz Manuel    Procedure Summary       Date: 10/15/24 Room / Location: [facility]    Anesthesia Start: 0905 Anesthesia Stop:     Procedure: COLONOSCOPY Diagnosis: Screening for colon cancer    Scheduled Providers: Jaren ELLINGTON MD; Raj Black MD; MICHOACANO Epstein-CRNA Responsible Provider: Raj Black MD    Anesthesia Type: MAC ASA Status: 3            Anesthesia Type: MAC    Vitals Value Taken Time   BP 93/53 10/15/24 0955   Temp 36 10/15/24 0955   Pulse 50 10/15/24 0955   Resp 16 10/15/24 0955   SpO2 97% 10/15/24 0955       Anesthesia Post Evaluation    Patient location during evaluation: bedside  Patient participation: complete - patient participated  Level of consciousness: awake  Pain management: adequate  Airway patency: patent  Cardiovascular status: acceptable, blood pressure returned to baseline and hemodynamically stable  Respiratory status: acceptable, nonlabored ventilation, spontaneous ventilation, room air and unassisted  Hydration status: acceptable  Postoperative Nausea and Vomiting: none      There were no known notable events for this encounter.

## 2024-10-15 NOTE — DISCHARGE INSTRUCTIONS
Patient Instructions after a Colonoscopy      The anesthetics, sedatives or narcotics which were given to you today will be acting in your body for the next 24 hours, so you might feel a little sleepy or groggy.  This feeling should slowly wear off. Carefully read and follow the instructions.     You received sedation today:  - Do not drive or operate any machinery or power tools of any kind.   - No alcoholic beverages today, not even beer or wine.  - Do not make any important decisions or sign any legal documents.  - No over the counter medications that contain alcohol or that may cause drowsiness.  - Do not make any important decisions or sign any legal documents.    While it is common to experience mild to moderate abdominal distention, gas, or belching after your procedure, if any of these symptoms occur following discharge from the GI Lab or within one week of having your procedure, call the [facility] to be advised whether a visit to your nearest Urgent Care or Emergency Department is indicated.  Take this paper with you if you go.     - If you develop an allergic reaction to the medications that were given during your procedure such as difficulty breathing, rash, hives, severe nausea, vomiting or lightheadedness.  - If you experience chest pain, shortness of breath, severe abdominal pain, fevers and chills.  -If you develop signs and symptoms of bleeding such as blood in your spit, if your stools turn black, tarry, or bloody  - If you have not urinated within 8 hours following your procedure.  - If your IV site becomes painful, red, inflamed, or looks infected.    If you received a biopsy/polypectomy/sphincterotomy the following instructions apply below:    __ Do not use Aspirin containing products, non-steroidal medications or anti-coagulants for one week following your procedure. (Examples of these types of medications are: Advil, Arthrotec, Aleve, Coumadin, Ecotrin, Heparin, Ibuprofen,  Indocin, Motrin, Naprosyn, Nuprin, Plavix, Vioxx, and Voltarin, or their generic forms.  This list is not all-inclusive.  Check with your physician or pharmacist before resuming medications.)   __ Eat a soft diet today.  Avoid foods that are poorly digested for the next 24 hours.  These foods would include: nuts, beans, lettuce, red meats, and fried foods. Start with liquids and advance your diet as tolerated, gradually work up to eating solids.   __ Do not have a Barium Study or Enema for one week.    Your physician recommends the additional following instructions:    -You have a contact number available for emergencies. The signs and symptoms of potential delayed complications were discussed with you. You may return to normal activities tomorrow.  -Resume your previous diet.  -Continue your present medications.   -We are waiting for your pathology results.  -Your physician has recommended a repeat colonoscopy (date to be determined after pending pathology results are reviewed) for surveillance based on pathology results.  -The findings and recommendations have been discussed with you.  -The findings and recommendations were discussed with your family.  - Please see Medication Reconciliation Form for new medication/medications prescribed.       If you experience any problems or have any questions following discharge from the GI Lab, please call:  Before 5p.m.  [phone]  After 5p.m.    [phone]    Nurse Signature                                                                        Date___________________                                                                            Patient/Responsible Party Signature                                        Date___________________High Fiber Diet    About this topic  Dietary fiber helps many illnesses. It can help you if you cannot have a bowel movement or if you have loose stools. Fiber can also lower your risk of diabetes and heart disease. Fiber can help  with weight loss by helping you feel jerez after meals.  You can find fiber in fruits, vegetables, nuts and seeds, whole grains, and legumes. The fiber is the part of the plant food that your body cannot break down and absorb. It passes through your stomach, small bowel, colon, and out your body.  There are two kinds of fiber: Insoluble and soluble fiber. Insoluble fiber helps you pass foods through your digestive system. Insoluble fiber can help you with hard stools. Soluble fiber draws water in and turns it into a gel-like form making digestion slow down. Both are important.    What will the results be?  A high fiber diet can help you with bowel problems like stools that are too hard or too loose. It can also help prevent hemorrhoids and other colon problems. A high fiber diet can also help control your weight and lower blood sugar and cholesterol levels.  What changes to diet are needed?  The amount of fiber you need is based on your age, gender, and health.  Try to get 20 to 35 grams of fiber in your diet each day. Most people in the [place] only eat 15 grams of fiber daily.  Drink at least 8 cups (1920 mL) of fluid each day.  When is this diet used?  Your doctor may talk with you about this diet if you have belly problems.  Who should use this diet?  Older children, young people, and adults can have this diet.  Who should not use this diet?  Some people should not use this diet. Check with your doctor if you have:  Diverticulitis  Active Crohn's disease  Ulcerative colitis  Bowel inflammation  Certain types of GI surgery  Talk to your child's doctor before starting your child on a high fiber diet.  What foods are good to eat?  To get the most from fiber in your diet, eat a wide variety of high fiber foods. Some examples are:  Vegetables like:  Spinach  Peas  Artichoke  Sweet potatoes with skin  Broccoli  Fruits like:  Raspberries  Blueberries  Blackberries  Apples with skin  Dried fruits  Grains like:  Oat  bran  Barley  Whole wheat products  Wheat bran  Dried beans and nuts like:  Sunflower seeds  Almonds  Black beans  Chickpeas  What problems could happen?  Sudden increase of fiber intake can lead to gas, pain, fullness in your belly, and loose stools. Increase fiber gradually while drinking plenty of fluids.  Do not eat too much fiber. Your body will not take in vitamins and minerals as well if you eat too much fiber.  When do I need to call the doctor?  Health problem is not better or you are feeling worse.  Helpful tips  Start slow as you add more fiber to your diet. This may help prevent gas or cramps.  Try to eat the same amount of fiber each day. Aim to get your fiber from nutritious foods. Supplements do not offer the same benefits as food.  Read food labels with care to learn how much fiber is in the food you are eating.  If possible, do not peel fruits or vegetables before you eat them. Eating the peel gives you more fiber.  Where can I learn more?  Eat Right  https://www.eatright.org/food/vitamins-and-supplements/types-of-vitamins-and-nutrients/easy-ways-to-boost-fiber-in-your-daily-diet  Last Reviewed Date  2021-09-23

## 2024-10-15 NOTE — Clinical Note
Patient tolerated procedure well. Appears comfortable with no complaints of pain. VS stable. Arousable prior to transport. Patient transported to [facility] via cart.  Report called   per crna           . Handoff completed

## 2024-10-15 NOTE — H&P
History Of Present Illness  Diaz Manuel is a 64 y.o. male presenting with need for surveillance colonoscopy last colonoscopy was in 2021 no family history of colorectal cancer no GI related symptoms.     Past Medical History  Past Medical History:   Diagnosis Date    Cerebral vascular accident (Multi)     states no residual    CKD (chronic kidney disease)     COVID-19     VACCINATED    CVA (cerebrovascular accident) (Multi)     Eczema     Hyperlipidemia     Hypertension     Joint pain     Neuropathy     Nocturnal hypoxia     Oxygen dependent     OXYGEN AT BEDTIME ONLY- does not know how many L    Shortness of breath     Sleep apnea     oxygen at HS- does not know how many liters    Type 2 diabetes mellitus     Wears glasses     Wears glasses        Surgical History  Past Surgical History:   Procedure Laterality Date    COLONOSCOPY          Social History  He reports that he has been smoking cigarettes. He has never used smokeless tobacco. He reports that he does not currently use alcohol. He reports that he does not use drugs.    Family History  Family History   Problem Relation Name Age of Onset    Heart failure Mother      Other (black lung) Father          Allergies  Cefaclor    Review of Systems   All other systems reviewed and are negative.       Physical Exam   Physical Exam  Constitutional:       Appearance: Normal appearance.   HENT:      Head: Normocephalic and atraumatic.      Mouth/Throat:      Pharynx: Oropharynx is clear.   Eyes:      Pupils: Pupils are equal, round, and reactive to light.   Cardiovascular:      Rate and Rhythm: Normal rate and regular rhythm.   Pulmonary:      Effort: Pulmonary effort is normal.      Breath sounds: Normal breath sounds.   Abdominal:      General: Abdomen is flat. Bowel sounds are normal.      Palpations: Abdomen is soft.   Musculoskeletal:         General: Normal range of motion.      Cervical back: Normal range of motion.   Skin:     General: Skin is warm.  "  Neurological:      General: No focal deficit present.      Mental Status: She is alert. Mental status is at baseline.   Psychiatric:         Mood and Affect: Mood normal.     Last Recorded Vitals  Blood pressure 120/60, pulse 60, temperature 36.1 °C (97 °F), temperature source Temporal, resp. rate 16, height 1.702 m (5' 7\"), weight 103 kg (226 lb 3.1 oz), SpO2 98%.    Relevant Results             Assessment/Plan   Assessment & Plan  Screening for colon cancer      Need for surveillance colonoscopy       I spent  minutes in the professional and overall care of this patient.      Jaren Ervin MD    "

## 2024-10-23 LAB
LABORATORY COMMENT REPORT: NORMAL
PATH REPORT.FINAL DX SPEC: NORMAL
PATH REPORT.GROSS SPEC: NORMAL
PATH REPORT.RELEVANT HX SPEC: NORMAL
PATH REPORT.TOTAL CANCER: NORMAL

## 2024-11-11 ENCOUNTER — APPOINTMENT (OUTPATIENT)
Dept: PRIMARY CARE | Facility: CLINIC | Age: 64
End: 2024-11-11
Payer: COMMERCIAL

## 2024-11-11 VITALS
WEIGHT: 235 LBS | SYSTOLIC BLOOD PRESSURE: 125 MMHG | BODY MASS INDEX: 36.81 KG/M2 | OXYGEN SATURATION: 95 % | HEART RATE: 74 BPM | TEMPERATURE: 97.8 F | DIASTOLIC BLOOD PRESSURE: 75 MMHG

## 2024-11-11 DIAGNOSIS — Z79.4 TYPE 2 DIABETES MELLITUS WITHOUT COMPLICATION, WITH LONG-TERM CURRENT USE OF INSULIN (MULTI): ICD-10-CM

## 2024-11-11 DIAGNOSIS — E11.9 TYPE 2 DIABETES MELLITUS WITHOUT COMPLICATION, WITH LONG-TERM CURRENT USE OF INSULIN (MULTI): ICD-10-CM

## 2024-11-11 DIAGNOSIS — I10 HYPERTENSION, UNSPECIFIED TYPE: Primary | ICD-10-CM

## 2024-11-11 DIAGNOSIS — E78.5 HYPERLIPIDEMIA, UNSPECIFIED HYPERLIPIDEMIA TYPE: ICD-10-CM

## 2024-11-11 LAB — POC HEMOGLOBIN A1C: 7.1 % (ref 4.2–6.5)

## 2024-11-11 PROCEDURE — 90656 IIV3 VACC NO PRSV 0.5 ML IM: CPT | Performed by: FAMILY MEDICINE

## 2024-11-11 PROCEDURE — 99214 OFFICE O/P EST MOD 30 MIN: CPT | Performed by: FAMILY MEDICINE

## 2024-11-11 PROCEDURE — G2211 COMPLEX E/M VISIT ADD ON: HCPCS | Performed by: FAMILY MEDICINE

## 2024-11-11 PROCEDURE — 3048F LDL-C <100 MG/DL: CPT | Performed by: FAMILY MEDICINE

## 2024-11-11 PROCEDURE — 3078F DIAST BP <80 MM HG: CPT | Performed by: FAMILY MEDICINE

## 2024-11-11 PROCEDURE — 4010F ACE/ARB THERAPY RXD/TAKEN: CPT | Performed by: FAMILY MEDICINE

## 2024-11-11 PROCEDURE — 3074F SYST BP LT 130 MM HG: CPT | Performed by: FAMILY MEDICINE

## 2024-11-11 PROCEDURE — 83036 HEMOGLOBIN GLYCOSYLATED A1C: CPT | Performed by: FAMILY MEDICINE

## 2024-11-11 PROCEDURE — G0008 ADMIN INFLUENZA VIRUS VAC: HCPCS | Performed by: FAMILY MEDICINE

## 2024-11-11 RX ORDER — ICOSAPENT ETHYL 1 G/1
2 CAPSULE ORAL
Qty: 120 CAPSULE | Refills: 11 | Status: SHIPPED | OUTPATIENT
Start: 2024-11-11

## 2024-11-11 ASSESSMENT — ENCOUNTER SYMPTOMS
DIZZINESS: 0
COUGH: 0
HEADACHES: 0
DIABETIC ASSOCIATED SYMPTOMS: 0
FEVER: 0

## 2024-11-11 NOTE — ASSESSMENT & PLAN NOTE
Orders:    icosapent ethyL (Vascepa) 1 gram capsule; Take 2 capsules (2 g) by mouth 2 times daily (morning and late afternoon).

## 2024-11-11 NOTE — PROGRESS NOTES
Subjective   Patient ID: Diaz Manuel is a 64 y.o. male who presents for Diabetes.    Diabetes  He presents for his follow-up diabetic visit. He has type 2 diabetes mellitus. There are no hypoglycemic associated symptoms. Pertinent negatives for hypoglycemia include no dizziness or headaches. There are no diabetic associated symptoms. Pertinent negatives for diabetes include no chest pain. There are no hypoglycemic complications. He participates in exercise daily. His overall blood glucose range is 140-180 mg/dl. Eye exam is current.     Here today for follow-up  Diabetes mellitus: He is currently taking NovoLog sliding scale 3 times daily (2 units for every 50 glucose greater than 150).  His A1c at last visit was at goal at 6.6%.  He checks his glucose out of the office.  It was 145 today but typically has been averaging 120.  He follows with ophthalmology annually for diabetic eye exams.  He has been doing well with diet and exercise.  No episodes of hypoglycemia  Hypertension: Currently take spironolactone, metoprolol, lisinopril, amlodipine and chlorthalidone.  No elevated BP readings out of office.  No lightheadedness  Hyperlipidemia: He is currently taking atorvastatin 40 mg daily and Vascepa 2 g twice daily.  He was started on Vascepa at last visit due to elevated triglycerides at 540  Most recent lipid panel checked 9/6/2024 showed total cholesterol 99 and triglycerides 278      Review of Systems   Constitutional:  Negative for fever.   Respiratory:  Negative for cough.    Cardiovascular:  Negative for chest pain and leg swelling.   Neurological:  Negative for dizziness and headaches.       Objective   /75   Pulse 74   Temp 36.6 °C (97.8 °F) (Temporal)   Wt 107 kg (235 lb)   SpO2 95%   BMI 36.81 kg/m²     Physical Exam  Vitals reviewed.   Constitutional:       General: He is not in acute distress.     Appearance: Normal appearance. He is well-developed.   HENT:      Head: Normocephalic.   Eyes:       Conjunctiva/sclera: Conjunctivae normal.   Cardiovascular:      Rate and Rhythm: Normal rate and regular rhythm.      Heart sounds: Normal heart sounds.   Pulmonary:      Effort: Pulmonary effort is normal.      Breath sounds: Normal breath sounds.   Musculoskeletal:      Right lower leg: Edema present.      Left lower leg: Edema present.      Comments: Trace bilateral leg edema   Skin:     Findings: No rash.   Neurological:      Mental Status: He is alert.   Psychiatric:         Mood and Affect: Mood normal.         Behavior: Behavior normal.         Assessment/Plan   Assessment & Plan  Type 2 diabetes mellitus without complication, with long-term current use of insulin (Multi)    Orders:    POCT glycosylated hemoglobin (Hb A1C) manually resulted    Hyperlipidemia, unspecified hyperlipidemia type    Orders:    icosapent ethyL (Vascepa) 1 gram capsule; Take 2 capsules (2 g) by mouth 2 times daily (morning and late afternoon).    Hypertension, unspecified type              Diabetes mellitus: His A1c today has increased and is slightly above goal at 7.1%.  This had previously been in target range at 6.6%.  I recommended that he continue to work on diet and exercise and make sure that he is avoiding concentrated sugars in his diet.  We will continue NovoLog sliding scale at current dose and follow-up in 3 months to recheck A1c.  Will consider adjusting his medications if still above goal at that time  Hypertension: This is well-controlled with blood pressure of 125/75.  Continue current meds  Hyperlipidemia: His triglyceride level has improved from 5 40-2 78 since starting Vascepa.  Continue atorvastatin and Vascepa at current dose  Follow-up in 3 months for recheck and annual wellness visit

## 2024-11-18 DIAGNOSIS — Z79.4 TYPE 2 DIABETES MELLITUS WITHOUT COMPLICATION, WITH LONG-TERM CURRENT USE OF INSULIN (MULTI): ICD-10-CM

## 2024-11-18 DIAGNOSIS — E11.9 TYPE 2 DIABETES MELLITUS WITHOUT COMPLICATION, WITH LONG-TERM CURRENT USE OF INSULIN (MULTI): ICD-10-CM

## 2024-11-18 RX ORDER — LANCETS 33 GAUGE
EACH MISCELLANEOUS
Qty: 300 EACH | Refills: 3 | Status: SHIPPED | OUTPATIENT
Start: 2024-11-18

## 2024-11-18 RX ORDER — PEN NEEDLE, DIABETIC 29 G X1/2"
1 NEEDLE, DISPOSABLE MISCELLANEOUS 3 TIMES DAILY
Qty: 100 EACH | Refills: 2 | Status: SHIPPED | OUTPATIENT
Start: 2024-11-18

## 2024-11-18 NOTE — TELEPHONE ENCOUNTER
Rx Refill Request Telephone Encounter    Name:  Diaz Manuel  :  [id number]  Medication Name:    lancets 33 gauge misc             Specific Pharmacy location:  [facility]  Date of last appointment:  na  Date of next appointment:  na  Best number to reach patient:  na

## 2024-11-21 DIAGNOSIS — E78.5 HYPERLIPIDEMIA, UNSPECIFIED HYPERLIPIDEMIA TYPE: ICD-10-CM

## 2024-11-21 DIAGNOSIS — I10 HYPERTENSION, UNSPECIFIED TYPE: ICD-10-CM

## 2024-11-21 RX ORDER — AMLODIPINE BESYLATE 10 MG/1
10 TABLET ORAL DAILY
Qty: 90 TABLET | Refills: 3 | Status: SHIPPED | OUTPATIENT
Start: 2024-11-21

## 2024-11-21 RX ORDER — ATORVASTATIN CALCIUM 40 MG/1
40 TABLET, FILM COATED ORAL DAILY
Qty: 90 TABLET | Refills: 3 | Status: SHIPPED | OUTPATIENT
Start: 2024-11-21

## 2025-01-02 ENCOUNTER — TELEPHONE (OUTPATIENT)
Dept: PRIMARY CARE | Facility: CLINIC | Age: 65
End: 2025-01-02
Payer: COMMERCIAL

## 2025-01-02 DIAGNOSIS — E11.9 TYPE 2 DIABETES MELLITUS WITHOUT COMPLICATION, WITH LONG-TERM CURRENT USE OF INSULIN (MULTI): ICD-10-CM

## 2025-01-02 DIAGNOSIS — Z79.4 TYPE 2 DIABETES MELLITUS WITHOUT COMPLICATION, WITH LONG-TERM CURRENT USE OF INSULIN (MULTI): ICD-10-CM

## 2025-01-21 ENCOUNTER — TELEMEDICINE (OUTPATIENT)
Dept: PRIMARY CARE | Facility: CLINIC | Age: 65
End: 2025-01-21
Payer: COMMERCIAL

## 2025-01-21 VITALS — DIASTOLIC BLOOD PRESSURE: 87 MMHG | SYSTOLIC BLOOD PRESSURE: 128 MMHG

## 2025-01-21 DIAGNOSIS — Z79.4 LONG TERM (CURRENT) USE OF INSULIN (MULTI): ICD-10-CM

## 2025-01-21 DIAGNOSIS — N18.31 STAGE 3A CHRONIC KIDNEY DISEASE (MULTI): ICD-10-CM

## 2025-01-21 DIAGNOSIS — J44.9 CHRONIC OBSTRUCTIVE PULMONARY DISEASE, UNSPECIFIED COPD TYPE (MULTI): ICD-10-CM

## 2025-01-21 DIAGNOSIS — J06.9 UPPER RESPIRATORY TRACT INFECTION, UNSPECIFIED TYPE: Primary | ICD-10-CM

## 2025-01-21 RX ORDER — FLUTICASONE PROPIONATE 50 MCG
2 SPRAY, SUSPENSION (ML) NASAL DAILY
Qty: 16 G | Refills: 0 | Status: SHIPPED | OUTPATIENT
Start: 2025-01-21

## 2025-01-21 ASSESSMENT — ENCOUNTER SYMPTOMS
WHEEZING: 0
FEVER: 0
COUGH: 0

## 2025-01-21 ASSESSMENT — PATIENT HEALTH QUESTIONNAIRE - PHQ9
1. LITTLE INTEREST OR PLEASURE IN DOING THINGS: NOT AT ALL
2. FEELING DOWN, DEPRESSED OR HOPELESS: NOT AT ALL
SUM OF ALL RESPONSES TO PHQ9 QUESTIONS 1 AND 2: 0

## 2025-01-21 NOTE — ASSESSMENT & PLAN NOTE
Orders:    fluticasone (Flonase) 50 mcg/actuation nasal spray; Administer 2 sprays into each nostril once daily. Shake gently. Before first use, prime pump. After use, clean tip and replace cap.

## 2025-01-21 NOTE — ASSESSMENT & PLAN NOTE
Stable based on symptoms and exam.  Continue established treatment plan and follow-up at least yearly  Last A1c was close to goal at 7.1%.  We will recheck A1c at follow-up.  Continue insulin

## 2025-01-21 NOTE — PROGRESS NOTES
Subjective   Patient ID: Diaz Manuel is a 64 y.o. male who presents for URI and Other (PHQ2- ).    Virtual or Telephone Consent    A telephone visit (audio only) between the patient (at the originating site) and the provider (at the distant site) was utilized to provide this telehealth service.   Verbal consent was requested and obtained from Diaz Manuel on this date, 01/21/25 for a telehealth visit.     URI   This is a new problem. Episode onset: 3 days. Associated symptoms include congestion and sneezing. Pertinent negatives include no coughing or wheezing.      He has had a 3-day history of nasal congestion and a runny nose.  No cough, sore throat or fever.  No wheezing  He is a diabetic currently on NovoLog sliding scale 3 times per day.  His last A1c checked in November was slightly above goal at 7.1%.  No increased glucose readings since URI symptoms started and his last reading was 110    Patient Health Questionnaire-2 Score: 0 (1/21/2025  3:41 PM)       Review of Systems   Constitutional:  Negative for fever.   HENT:  Positive for congestion and sneezing.    Respiratory:  Negative for cough and wheezing.        Objective   /87 Comment: Home blood pressure reading    Physical Exam not done due to today's visit being done over the telephone    Assessment/Plan   Assessment & Plan  Upper respiratory tract infection, unspecified type    Orders:    fluticasone (Flonase) 50 mcg/actuation nasal spray; Administer 2 sprays into each nostril once daily. Shake gently. Before first use, prime pump. After use, clean tip and replace cap.    Chronic obstructive pulmonary disease, unspecified COPD type (Multi)  Stable based on symptoms and exam.  Continue established treatment plan and follow-up at least yearly  Denies any cough or wheezing         Long term (current) use of insulin (Multi)  Stable based on symptoms and exam.  Continue established treatment plan and follow-up at least yearly  Last A1c was close to  goal at 7.1%.  We will recheck A1c at follow-up.  Continue insulin         His symptoms have only been present for 2 to 3 days and I suspect that they are most likely viral.  We discussed symptomatic care and will start Flonase for his nasal symptoms.  Recommended calling if symptoms are not improving within the next 5 to 7 days  Total time today was 12 minutes

## 2025-01-21 NOTE — ASSESSMENT & PLAN NOTE
Stable based on symptoms and exam.  Continue established treatment plan and follow-up at least yearly  Denies any cough or wheezing

## 2025-01-23 NOTE — ASSESSMENT & PLAN NOTE
Stable based on symptoms and exam.  Continue established treatment plan and follow-up at least yearly  Last GFR check June 2024 was stable at 46, continue to monitor

## 2025-01-31 DIAGNOSIS — E11.00 TYPE 2 DIABETES MELLITUS WITH HYPEROSMOLARITY WITHOUT NONKETOTIC HYPERGLYCEMIC-HYPEROSMOLAR COMA (NKHHC) (MULTI): ICD-10-CM

## 2025-01-31 RX ORDER — ISOPROPYL ALCOHOL 70 ML/100ML
1 SWAB TOPICAL 3 TIMES DAILY
Qty: 300 EACH | Refills: 3 | Status: SHIPPED | OUTPATIENT
Start: 2025-01-31

## 2025-01-31 NOTE — TELEPHONE ENCOUNTER
Rx Refill Request Telephone Encounter    Name:  Diaz Manuel  :  [id number]  Medication Name:  alcohol swabs pads, medicated     Specific Pharmacy location:    [facility] [address] [place] [address] & [address] [address]  Phone: [phone]  Fax: [phone]  LANCE #: [id number]               
Patient

## 2025-02-13 ENCOUNTER — APPOINTMENT (OUTPATIENT)
Dept: PRIMARY CARE | Facility: CLINIC | Age: 65
End: 2025-02-13
Payer: COMMERCIAL

## 2025-02-18 DIAGNOSIS — I10 HYPERTENSION, UNSPECIFIED TYPE: ICD-10-CM

## 2025-02-18 RX ORDER — METOPROLOL TARTRATE 100 MG/1
50 TABLET ORAL 2 TIMES DAILY
Qty: 90 TABLET | Refills: 3 | Status: SHIPPED | OUTPATIENT
Start: 2025-02-18

## 2025-02-26 ENCOUNTER — APPOINTMENT (OUTPATIENT)
Dept: PRIMARY CARE | Facility: CLINIC | Age: 65
End: 2025-02-26
Payer: COMMERCIAL

## 2025-02-26 VITALS
WEIGHT: 233 LBS | TEMPERATURE: 98.1 F | SYSTOLIC BLOOD PRESSURE: 109 MMHG | HEART RATE: 55 BPM | BODY MASS INDEX: 36.49 KG/M2 | OXYGEN SATURATION: 95 % | DIASTOLIC BLOOD PRESSURE: 69 MMHG

## 2025-02-26 DIAGNOSIS — E11.9 TYPE 2 DIABETES MELLITUS WITHOUT COMPLICATION, WITH LONG-TERM CURRENT USE OF INSULIN (MULTI): ICD-10-CM

## 2025-02-26 DIAGNOSIS — Z79.4 TYPE 2 DIABETES MELLITUS WITHOUT COMPLICATION, WITH LONG-TERM CURRENT USE OF INSULIN (MULTI): ICD-10-CM

## 2025-02-26 DIAGNOSIS — E78.5 HYPERLIPIDEMIA, UNSPECIFIED HYPERLIPIDEMIA TYPE: ICD-10-CM

## 2025-02-26 DIAGNOSIS — I10 HYPERTENSION, UNSPECIFIED TYPE: ICD-10-CM

## 2025-02-26 DIAGNOSIS — J42 CHRONIC BRONCHITIS, UNSPECIFIED CHRONIC BRONCHITIS TYPE (MULTI): ICD-10-CM

## 2025-02-26 DIAGNOSIS — N18.31 STAGE 3A CHRONIC KIDNEY DISEASE (MULTI): ICD-10-CM

## 2025-02-26 DIAGNOSIS — Z00.00 ROUTINE GENERAL MEDICAL EXAMINATION AT HEALTH CARE FACILITY: Primary | ICD-10-CM

## 2025-02-26 PROBLEM — S37.009A INJURY OF KIDNEY: Status: RESOLVED | Noted: 2024-08-23 | Resolved: 2025-02-26

## 2025-02-26 PROBLEM — L98.9 SKIN LESION OF SCALP: Status: RESOLVED | Noted: 2024-08-23 | Resolved: 2025-02-26

## 2025-02-26 PROBLEM — R22.41 SUBCUTANEOUS MASS OF RIGHT LOWER EXTREMITY: Status: RESOLVED | Noted: 2024-08-23 | Resolved: 2025-02-26

## 2025-02-26 PROBLEM — M54.2 NECK PAIN: Status: RESOLVED | Noted: 2024-08-23 | Resolved: 2025-02-26

## 2025-02-26 PROBLEM — J06.9 UPPER RESPIRATORY TRACT INFECTION: Status: RESOLVED | Noted: 2024-08-23 | Resolved: 2025-02-26

## 2025-02-26 PROBLEM — M79.606 PAIN OF LOWER EXTREMITY: Status: RESOLVED | Noted: 2024-08-23 | Resolved: 2025-02-26

## 2025-02-26 LAB — POC HEMOGLOBIN A1C: 6.9 % (ref 4.2–6.5)

## 2025-02-26 PROCEDURE — 83036 HEMOGLOBIN GLYCOSYLATED A1C: CPT | Performed by: FAMILY MEDICINE

## 2025-02-26 PROCEDURE — 3074F SYST BP LT 130 MM HG: CPT | Performed by: FAMILY MEDICINE

## 2025-02-26 PROCEDURE — 4010F ACE/ARB THERAPY RXD/TAKEN: CPT | Performed by: FAMILY MEDICINE

## 2025-02-26 PROCEDURE — 3078F DIAST BP <80 MM HG: CPT | Performed by: FAMILY MEDICINE

## 2025-02-26 PROCEDURE — 99214 OFFICE O/P EST MOD 30 MIN: CPT | Performed by: FAMILY MEDICINE

## 2025-02-26 PROCEDURE — G0439 PPPS, SUBSEQ VISIT: HCPCS | Performed by: FAMILY MEDICINE

## 2025-02-26 ASSESSMENT — ACTIVITIES OF DAILY LIVING (ADL)
MANAGING_FINANCES: INDEPENDENT
DOING_HOUSEWORK: INDEPENDENT
BATHING: INDEPENDENT
TAKING_MEDICATION: INDEPENDENT
DRESSING: INDEPENDENT
GROCERY_SHOPPING: INDEPENDENT

## 2025-02-26 ASSESSMENT — ENCOUNTER SYMPTOMS
COUGH: 0
FEVER: 0
NUMBNESS: 0
DIABETIC ASSOCIATED SYMPTOMS: 0
HEADACHES: 0
ABDOMINAL PAIN: 0
DIZZINESS: 0

## 2025-02-26 NOTE — PROGRESS NOTES
Subjective   Patient ID: Diaz Manuel is a 64 y.o. male who presents for Annual Exam (PHQ2- negative /Fall screen- negative /Advanced care plan - not active) and Diabetes.    Diabetes  He presents for his follow-up diabetic visit. He has type 2 diabetes mellitus. There are no hypoglycemic associated symptoms. Pertinent negatives for hypoglycemia include no dizziness or headaches. There are no diabetic associated symptoms. Pertinent negatives for diabetes include no chest pain. He participates in exercise daily. Eye exam is current.      He is here today for annual Medicare wellness visit and also follow-up  Diabetes mellitus: He is currently taking NovoLog sliding scale 3 times daily.  He administers 2 units for every 50 glucose greater than 150.  He had previously been taking daily Lantus, but had stopped due to good control of his glucose  He monitors his glucose out of the office and average has been 141.  He uses his sliding scale infrequently  He has been doing well with diet and avoids concentrated sugars  Hypertension: He is currently taking spironolactone, metoprolol, lisinopril, amlodipine and chlorthalidone.  Denies any dizziness or elevated BP readings out of the office  He is up-to-date on diabetic eye exams  Currently takes atorvastatin 40 mg daily and Vascepa 2 g twice daily for hyperlipidemia.  Last lipid panel done 9/6/2024 showed LDL of 1 and triglycerides of 378  He has a history of stage IIIa chronic kidney disease which has been stable.  He saw nephrology a few years ago and was told that no follow-up is needed.  His last GFR checked 6/13/2024 was 46  Up-to-date on tetanus pneumococcal vaccines.  He needs the shingles vaccine series  Prostate cancer screening: Last PSA checked in June of last year was normal range  Last colonoscopy was done 2024.  Repeat 5 years recommended  He is a current smoker.  Has averaged 1 pack/day since age 13.  Last low-dose CT lung screen was done 3/2024    Patient  Health Questionnaire-2 Score: 0 (2/26/2025 10:30 AM)      Review of Systems   Constitutional:  Negative for fever.   Respiratory:  Negative for cough.    Cardiovascular:  Negative for chest pain and leg swelling.   Gastrointestinal:  Negative for abdominal pain.   Neurological:  Negative for dizziness, numbness and headaches.   All other systems reviewed and are negative.      Objective   /69   Pulse 55   Temp 36.7 °C (98.1 °F) (Temporal)   Wt 106 kg (233 lb)   SpO2 95%   BMI 36.49 kg/m²     Physical Exam  Vitals reviewed.   Constitutional:       General: He is not in acute distress.     Appearance: Normal appearance. He is well-developed.   HENT:      Head: Normocephalic.      Right Ear: Tympanic membrane, ear canal and external ear normal.      Left Ear: Tympanic membrane, ear canal and external ear normal.      Nose: Nose normal.      Mouth/Throat:      Mouth: Mucous membranes are moist.   Eyes:      Conjunctiva/sclera: Conjunctivae normal.   Neck:      Thyroid: No thyromegaly.      Vascular: No JVD.   Cardiovascular:      Rate and Rhythm: Normal rate and regular rhythm.      Heart sounds: Normal heart sounds.   Pulmonary:      Effort: Pulmonary effort is normal.      Breath sounds: Normal breath sounds.   Abdominal:      Palpations: Abdomen is soft.      Tenderness: There is no abdominal tenderness.   Musculoskeletal:         General: Normal range of motion.      Right lower leg: No edema.      Left lower leg: No edema.   Lymphadenopathy:      Cervical: No cervical adenopathy.   Skin:     Findings: No rash.   Neurological:      Mental Status: He is alert and oriented to person, place, and time.   Psychiatric:         Mood and Affect: Mood normal.         Behavior: Behavior normal.         Assessment/Plan   Assessment & Plan  Routine general medical examination at health care facility    Orders:    1 Year Follow Up In [facility] - PCP - Wellness Exam; Future    Type 2 diabetes mellitus  without complication, with long-term current use of insulin (Multi)    Orders:    POCT glycosylated hemoglobin (Hb A1C) manually resulted    Basic Metabolic Panel; Future    Albumin-Creatinine Ratio, Urine Random    Hyperlipidemia, unspecified hyperlipidemia type         Hypertension, unspecified type         Stage 3a chronic kidney disease (Multi)         Chronic bronchitis, unspecified chronic bronchitis type (Multi)  Stable based on symptoms and exam.  Continue established treatment plan and follow-up at least yearly  Denies cough or wheezing.  Tobacco cessation is recommended         #1 preventative health:  Continue healthy diet and regular exercise  We discussed the importance of tobacco cessation  He is due for his annual low-dose CT lung screen next month.  This has been ordered  He is up-to-date on prostate cancer and colon cancer screening  Up-to-date on tetanus and pneumococcal vaccines.  Recommended shingles vaccine at pharmacy  2.  Diabetes mellitus: This is controlled with A1c 6.9%.  Continue coverage with sliding scale insulin 3 times per day  3.  Hypertension: This is well-controlled with blood pressure of 109/69.  Continue meds including spironolactone, metoprolol, amlodipine, lisinopril and chlorthalidone  4.  Hyperlipidemia: Stable on atorvastatin and Vascepa, continue current dose  Stage IIIa CKD: This has remained stable, continue to monitor.  We will recheck BMP and also obtained UACR today  Follow-up in 4 months for recheck

## 2025-02-26 NOTE — ASSESSMENT & PLAN NOTE
Stable based on symptoms and exam.  Continue established treatment plan and follow-up at least yearly  Denies cough or wheezing.  Tobacco cessation is recommended

## 2025-02-26 NOTE — ASSESSMENT & PLAN NOTE
Orders:    POCT glycosylated hemoglobin (Hb A1C) manually resulted    Basic Metabolic Panel; Future    Albumin-Creatinine Ratio, Urine Random

## 2025-02-27 LAB
ALBUMIN/CREAT UR: 3 MG/G CREAT
CREAT UR-MCNC: 105 MG/DL (ref 20–320)
MICROALBUMIN UR-MCNC: 0.3 MG/DL

## 2025-03-12 ENCOUNTER — TELEPHONE (OUTPATIENT)
Dept: RADIOLOGY | Facility: HOSPITAL | Age: 65
End: 2025-03-12
Payer: COMMERCIAL

## 2025-03-31 ENCOUNTER — HOSPITAL ENCOUNTER (OUTPATIENT)
Dept: RADIOLOGY | Facility: HOSPITAL | Age: 65
Discharge: HOME | End: 2025-03-31
Payer: COMMERCIAL

## 2025-03-31 DIAGNOSIS — Z87.891 PERSONAL HISTORY OF NICOTINE DEPENDENCE: ICD-10-CM

## 2025-03-31 PROCEDURE — 71271 CT THORAX LUNG CANCER SCR C-: CPT | Performed by: RADIOLOGY

## 2025-03-31 PROCEDURE — 71271 CT THORAX LUNG CANCER SCR C-: CPT

## 2025-04-03 ENCOUNTER — TELEPHONE (OUTPATIENT)
Dept: PRIMARY CARE | Facility: CLINIC | Age: 65
End: 2025-04-03
Payer: COMMERCIAL

## 2025-04-03 DIAGNOSIS — J44.9 CHRONIC OBSTRUCTIVE PULMONARY DISEASE, UNSPECIFIED COPD TYPE (MULTI): Primary | ICD-10-CM

## 2025-04-03 DIAGNOSIS — I63.9 CEREBROVASCULAR ACCIDENT (CVA), UNSPECIFIED MECHANISM (MULTI): ICD-10-CM

## 2025-04-15 ENCOUNTER — TELEPHONE (OUTPATIENT)
Dept: PRIMARY CARE | Facility: CLINIC | Age: 65
End: 2025-04-15

## 2025-04-15 ENCOUNTER — TELEMEDICINE (OUTPATIENT)
Dept: PRIMARY CARE | Facility: CLINIC | Age: 65
End: 2025-04-15
Payer: COMMERCIAL

## 2025-04-15 DIAGNOSIS — J01.90 ACUTE NON-RECURRENT SINUSITIS, UNSPECIFIED LOCATION: ICD-10-CM

## 2025-04-15 PROCEDURE — 4010F ACE/ARB THERAPY RXD/TAKEN: CPT | Performed by: FAMILY MEDICINE

## 2025-04-15 PROCEDURE — 3044F HG A1C LEVEL LT 7.0%: CPT | Performed by: FAMILY MEDICINE

## 2025-04-15 PROCEDURE — G2211 COMPLEX E/M VISIT ADD ON: HCPCS | Performed by: FAMILY MEDICINE

## 2025-04-15 PROCEDURE — 99213 OFFICE O/P EST LOW 20 MIN: CPT | Performed by: FAMILY MEDICINE

## 2025-04-15 RX ORDER — FLUTICASONE PROPIONATE 50 MCG
2 SPRAY, SUSPENSION (ML) NASAL DAILY
Qty: 16 G | Refills: 0 | Status: SHIPPED | OUTPATIENT
Start: 2025-04-15

## 2025-04-15 RX ORDER — DOXYCYCLINE 100 MG/1
100 CAPSULE ORAL 2 TIMES DAILY
Qty: 14 CAPSULE | Refills: 0 | Status: SHIPPED | OUTPATIENT
Start: 2025-04-15 | End: 2025-04-22

## 2025-04-15 ASSESSMENT — ENCOUNTER SYMPTOMS
SINUS PAIN: 1
SINUS PRESSURE: 1
COUGH: 1
WHEEZING: 0
FEVER: 0

## 2025-04-15 NOTE — PROGRESS NOTES
Subjective   Patient ID: Diaz Manuel is a 64 y.o. male who presents for URI.    Virtual or Telephone Consent    An interactive audio and video telecommunication system which permits real time communications between the patient (at the originating site) and provider (at the distant site) was utilized to provide this telehealth service.   Verbal consent was requested and obtained from Diaz Manuel on this date, 04/15/25 for a telehealth visit and the patient's location was confirmed at the time of the visit.    URI   This is a new problem. The current episode started 1 to 4 weeks ago. Associated symptoms include congestion, coughing and sinus pain. Pertinent negatives include no wheezing. Associated symptoms comments: Runny nose, green drainage.      Unable to get video to work and today's visit was done over the telephone with audio-only    He is concerned about a possible sinus infection  He has had symptoms present for approximately 8 to 9 days.  Main symptoms have been sinus pressure, nasal congestion, and nasal drainage.  He has also had a dry cough.  No wheezing.  No fever.  Symptoms are not improving.  He is taking Robitussin to treat symptoms without improvement  Past medical history is significant for diabetes mellitus with last A1c 6.9%, hypertension, hyperlipidemia, and stage IIIa chronic kidney disease    Review of Systems   Constitutional:  Negative for fever.   HENT:  Positive for congestion, sinus pressure and sinus pain.    Respiratory:  Positive for cough. Negative for wheezing.        Objective   There were no vitals taken for this visit.    Physical Exam    Assessment/Plan   Assessment & Plan  Acute non-recurrent sinusitis, unspecified location    Orders:    doxycycline (Vibramycin) 100 mg capsule; Take 1 capsule (100 mg) by mouth 2 times a day for 7 days. Take with at least 8 ounces (large glass) of water, do not lie down for 30 minutes after    fluticasone (Flonase) 50 mcg/actuation nasal spray;  Administer 2 sprays into each nostril once daily. Shake gently. Before first use, prime pump. After use, clean tip and replace cap.    Since his symptoms have not been improving at 8 to 9 days, I feel that it would be reasonable to start an antibiotic for suspected bacterial sinusitis.  We will treat with doxycycline 100 mg twice daily for 7 days and also recommended Flonase.  Follow-up if symptoms are not resolving within the next 5 to 7 days after starting antibiotic

## 2025-05-12 ENCOUNTER — TELEPHONE (OUTPATIENT)
Dept: PRIMARY CARE | Facility: CLINIC | Age: 65
End: 2025-05-12
Payer: COMMERCIAL

## 2025-05-12 DIAGNOSIS — I10 HYPERTENSION, UNSPECIFIED TYPE: ICD-10-CM

## 2025-05-12 DIAGNOSIS — I63.9 CEREBROVASCULAR ACCIDENT (CVA), UNSPECIFIED MECHANISM (MULTI): Primary | ICD-10-CM

## 2025-05-12 RX ORDER — SPIRONOLACTONE 25 MG/1
25 TABLET ORAL DAILY
Qty: 90 TABLET | Refills: 3 | Status: SHIPPED | OUTPATIENT
Start: 2025-05-12

## 2025-06-06 ENCOUNTER — TELEPHONE (OUTPATIENT)
Dept: PRIMARY CARE | Facility: CLINIC | Age: 65
End: 2025-06-06

## 2025-06-06 ENCOUNTER — TELEMEDICINE (OUTPATIENT)
Dept: PRIMARY CARE | Facility: CLINIC | Age: 65
End: 2025-06-06
Payer: COMMERCIAL

## 2025-06-06 DIAGNOSIS — Z79.4 TYPE 2 DIABETES MELLITUS WITHOUT COMPLICATION, WITH LONG-TERM CURRENT USE OF INSULIN: ICD-10-CM

## 2025-06-06 DIAGNOSIS — E11.9 TYPE 2 DIABETES MELLITUS WITHOUT COMPLICATION, WITH LONG-TERM CURRENT USE OF INSULIN: ICD-10-CM

## 2025-06-06 DIAGNOSIS — J20.9 ACUTE BRONCHITIS, UNSPECIFIED ORGANISM: Primary | ICD-10-CM

## 2025-06-06 DIAGNOSIS — Z12.5 SCREENING FOR PROSTATE CANCER: ICD-10-CM

## 2025-06-06 PROCEDURE — 4010F ACE/ARB THERAPY RXD/TAKEN: CPT | Performed by: FAMILY MEDICINE

## 2025-06-06 PROCEDURE — 99213 OFFICE O/P EST LOW 20 MIN: CPT | Performed by: FAMILY MEDICINE

## 2025-06-06 PROCEDURE — 3044F HG A1C LEVEL LT 7.0%: CPT | Performed by: FAMILY MEDICINE

## 2025-06-06 RX ORDER — BENZONATATE 100 MG/1
100 CAPSULE ORAL 3 TIMES DAILY PRN
Qty: 21 CAPSULE | Refills: 0 | Status: SHIPPED | OUTPATIENT
Start: 2025-06-06 | End: 2025-06-13

## 2025-06-06 RX ORDER — DOXYCYCLINE 100 MG/1
100 CAPSULE ORAL 2 TIMES DAILY
Qty: 14 CAPSULE | Refills: 0 | Status: SHIPPED | OUTPATIENT
Start: 2025-06-06 | End: 2025-06-13

## 2025-06-06 ASSESSMENT — ENCOUNTER SYMPTOMS
COUGH: 1
RHINORRHEA: 1
WHEEZING: 0
FEVER: 0

## 2025-06-06 NOTE — PROGRESS NOTES
Subjective   Patient ID: Diaz Manuel is a 64 y.o. male who presents for URI.    Virtual or Telephone Consent    An interactive audio and video telecommunication system which permits real time communications between the patient (at the originating site) and provider (at the distant site) was utilized to provide this telehealth service.   Verbal consent was requested and obtained from Diaz Manuel on this date, 06/06/25 for a telehealth visit and the patient's location was confirmed at the time of the visit.    URI   Associated symptoms include congestion, coughing and rhinorrhea. Pertinent negatives include no wheezing.      Today's video was done over the telephone.  We did not establish video connection  He has had a green productive cough for 2 days.  Also has had nasal congestion and green nasal drainage.  No sinus pressure.  No ear pain or sore throat.  No fever or wheezing  He is a diabetic.  His glucose this morning was 177  He is currently taking Flonase          Review of Systems   Constitutional:  Negative for fever.   HENT:  Positive for congestion and rhinorrhea.    Respiratory:  Positive for cough. Negative for wheezing.        Objective   There were no vitals taken for this visit.    Physical Exam    Assessment/Plan   Assessment & Plan  Acute bronchitis, unspecified organism    Orders:    doxycycline (Vibramycin) 100 mg capsule; Take 1 capsule (100 mg) by mouth 2 times a day for 7 days. Take with at least 8 ounces (large glass) of water, do not lie down for 30 minutes after    benzonatate (Tessalon) 100 mg capsule; Take 1 capsule (100 mg) by mouth 3 times a day as needed for cough for up to 7 days. Do not crush or chew.    CBC; Future    Comprehensive Metabolic Panel; Future    Lipid Panel; Future    TSH with reflex to Free T4 if abnormal; Future    Screening for prostate cancer    Orders:    CBC; Future    Comprehensive Metabolic Panel; Future    Lipid Panel; Future    TSH with reflex to Free T4 if  abnormal; Future    Prostate Specific Antigen, Screen; Future    Type 2 diabetes mellitus without complication, with long-term current use of insulin    Orders:    CBC; Future    Comprehensive Metabolic Panel; Future    Lipid Panel; Future    TSH with reflex to Free T4 if abnormal; Future    His symptoms of only been present for 2 days, and we discussed at this point they are most likely viral.  We discussed symptomatic care including continuing Flonase and I sent in Tessalon Perles for him to take as needed  I did send in a prescription for doxycycline, but recommended that he hold onto this and only take if his symptoms are not improving in the next 5 to 7 days  He is scheduled to see me at the end of this month for a follow-up, and he is due for his annual labs.  I will order this for him to get done prior to his upcoming appointment

## 2025-06-06 NOTE — TELEPHONE ENCOUNTER
Pt called in asking if something can be sent in to the pharmacy because he is coughing up green mucus.      He also has a phone number change that I will updated onto the account [phone]

## 2025-06-20 DIAGNOSIS — Z79.4 TYPE 2 DIABETES MELLITUS WITHOUT COMPLICATION, WITH LONG-TERM CURRENT USE OF INSULIN: Primary | ICD-10-CM

## 2025-06-20 DIAGNOSIS — E11.9 TYPE 2 DIABETES MELLITUS WITHOUT COMPLICATION, WITH LONG-TERM CURRENT USE OF INSULIN: Primary | ICD-10-CM

## 2025-06-20 RX ORDER — INSULIN LISPRO 100 [IU]/ML
2 INJECTION, SOLUTION INTRAVENOUS; SUBCUTANEOUS
COMMUNITY
End: 2025-06-20 | Stop reason: SDUPTHER

## 2025-06-20 RX ORDER — INSULIN LISPRO 100 [IU]/ML
6-12 INJECTION, SUSPENSION SUBCUTANEOUS SEE ADMIN INSTRUCTIONS
Qty: 6 EACH | Refills: 2 | Status: CANCELLED | OUTPATIENT
Start: 2025-06-20

## 2025-06-20 RX ORDER — INSULIN LISPRO 100 [IU]/ML
INJECTION, SOLUTION INTRAVENOUS; SUBCUTANEOUS
Qty: 4 EACH | Refills: 2 | Status: SHIPPED | OUTPATIENT
Start: 2025-06-20

## 2025-06-20 NOTE — TELEPHONE ENCOUNTER
Rx Refill Request Telephone Encounter    Name:  Diaz Manuel  :  [id number]  Medication Name:      insulin lispro protamin-lispro (HumaLOG Mix 50-50 KwikPen) 100 unit/mL (50-50) injection     Specific Pharmacy location:  [facility] #[id number] AT [facility] & [address] [address]  Phone: [phone]  Fax: [phone]  LANCE #: [id number]

## 2025-06-23 LAB
ALBUMIN SERPL-MCNC: 4.3 G/DL (ref 3.6–5.1)
ALP SERPL-CCNC: 57 U/L (ref 35–144)
ALT SERPL-CCNC: 12 U/L (ref 9–46)
ANION GAP SERPL CALCULATED.4IONS-SCNC: 8 MMOL/L (CALC) (ref 7–17)
AST SERPL-CCNC: 13 U/L (ref 10–35)
BILIRUB SERPL-MCNC: 0.4 MG/DL (ref 0.2–1.2)
BUN SERPL-MCNC: 41 MG/DL (ref 7–25)
CALCIUM SERPL-MCNC: 9.2 MG/DL (ref 8.6–10.3)
CHLORIDE SERPL-SCNC: 104 MMOL/L (ref 98–110)
CHOLEST SERPL-MCNC: 90 MG/DL
CHOLEST/HDLC SERPL: 4.3 (CALC)
CO2 SERPL-SCNC: 23 MMOL/L (ref 20–32)
CREAT SERPL-MCNC: 1.91 MG/DL (ref 0.7–1.35)
EGFRCR SERPLBLD CKD-EPI 2021: 39 ML/MIN/1.73M2
ERYTHROCYTE [DISTWIDTH] IN BLOOD BY AUTOMATED COUNT: 13.2 % (ref 11–15)
GLUCOSE SERPL-MCNC: 119 MG/DL (ref 65–99)
HCT VFR BLD AUTO: 39.8 % (ref 38.5–50)
HDLC SERPL-MCNC: 21 MG/DL
HGB BLD-MCNC: 12.8 G/DL (ref 13.2–17.1)
LDLC SERPL CALC-MCNC: 38 MG/DL (CALC)
MCH RBC QN AUTO: 30.5 PG (ref 27–33)
MCHC RBC AUTO-ENTMCNC: 32.2 G/DL (ref 32–36)
MCV RBC AUTO: 95 FL (ref 80–100)
NONHDLC SERPL-MCNC: 69 MG/DL (CALC)
PLATELET # BLD AUTO: 169 THOUSAND/UL (ref 140–400)
PMV BLD REES-ECKER: 8.6 FL (ref 7.5–12.5)
POTASSIUM SERPL-SCNC: 5.2 MMOL/L (ref 3.5–5.3)
PROT SERPL-MCNC: 6.9 G/DL (ref 6.1–8.1)
PSA SERPL-MCNC: 1.92 NG/ML
RBC # BLD AUTO: 4.19 MILLION/UL (ref 4.2–5.8)
SODIUM SERPL-SCNC: 135 MMOL/L (ref 135–146)
TRIGL SERPL-MCNC: 266 MG/DL
TSH SERPL-ACNC: 0.85 MIU/L (ref 0.4–4.5)
WBC # BLD AUTO: 9.3 THOUSAND/UL (ref 3.8–10.8)

## 2025-06-26 ENCOUNTER — APPOINTMENT (OUTPATIENT)
Dept: PRIMARY CARE | Facility: CLINIC | Age: 65
End: 2025-06-26
Payer: COMMERCIAL

## 2025-07-07 ENCOUNTER — APPOINTMENT (OUTPATIENT)
Dept: PRIMARY CARE | Facility: CLINIC | Age: 65
End: 2025-07-07
Payer: COMMERCIAL

## 2025-07-07 VITALS
WEIGHT: 228 LBS | HEART RATE: 60 BPM | DIASTOLIC BLOOD PRESSURE: 66 MMHG | OXYGEN SATURATION: 94 % | BODY MASS INDEX: 35.71 KG/M2 | SYSTOLIC BLOOD PRESSURE: 107 MMHG

## 2025-07-07 DIAGNOSIS — D64.9 ANEMIA, UNSPECIFIED TYPE: ICD-10-CM

## 2025-07-07 DIAGNOSIS — I10 HYPERTENSION, UNSPECIFIED TYPE: ICD-10-CM

## 2025-07-07 DIAGNOSIS — E11.9 TYPE 2 DIABETES MELLITUS WITHOUT COMPLICATION, WITH LONG-TERM CURRENT USE OF INSULIN: Primary | ICD-10-CM

## 2025-07-07 DIAGNOSIS — J01.90 ACUTE NON-RECURRENT SINUSITIS, UNSPECIFIED LOCATION: ICD-10-CM

## 2025-07-07 DIAGNOSIS — N18.31 STAGE 3A CHRONIC KIDNEY DISEASE (MULTI): ICD-10-CM

## 2025-07-07 DIAGNOSIS — E78.5 HYPERLIPIDEMIA, UNSPECIFIED HYPERLIPIDEMIA TYPE: ICD-10-CM

## 2025-07-07 DIAGNOSIS — Z79.4 TYPE 2 DIABETES MELLITUS WITHOUT COMPLICATION, WITH LONG-TERM CURRENT USE OF INSULIN: Primary | ICD-10-CM

## 2025-07-07 LAB — POC HEMOGLOBIN A1C: 6.8 % (ref 4.2–6.5)

## 2025-07-07 PROCEDURE — 99214 OFFICE O/P EST MOD 30 MIN: CPT | Performed by: FAMILY MEDICINE

## 2025-07-07 PROCEDURE — 83036 HEMOGLOBIN GLYCOSYLATED A1C: CPT | Performed by: FAMILY MEDICINE

## 2025-07-07 PROCEDURE — 3078F DIAST BP <80 MM HG: CPT | Performed by: FAMILY MEDICINE

## 2025-07-07 PROCEDURE — 3044F HG A1C LEVEL LT 7.0%: CPT | Performed by: FAMILY MEDICINE

## 2025-07-07 PROCEDURE — 3074F SYST BP LT 130 MM HG: CPT | Performed by: FAMILY MEDICINE

## 2025-07-07 PROCEDURE — G2211 COMPLEX E/M VISIT ADD ON: HCPCS | Performed by: FAMILY MEDICINE

## 2025-07-07 PROCEDURE — 4010F ACE/ARB THERAPY RXD/TAKEN: CPT | Performed by: FAMILY MEDICINE

## 2025-07-07 ASSESSMENT — ENCOUNTER SYMPTOMS
COUGH: 0
NUMBNESS: 0
HEADACHES: 0
FEVER: 0
DIABETIC ASSOCIATED SYMPTOMS: 0
DIZZINESS: 0

## 2025-07-07 NOTE — PROGRESS NOTES
Subjective   Patient ID: Diaz Manuel is a 64 y.o. male who presents for Diabetes.    Diabetes  He presents for his follow-up diabetic visit. He has type 2 diabetes mellitus. There are no hypoglycemic associated symptoms. Pertinent negatives for hypoglycemia include no dizziness or headaches. There are no diabetic associated symptoms. Pertinent negatives for diabetes include no chest pain. He is following a generally healthy diet. He participates in exercise intermittently. Eye exam is current.        He is here today for follow-up  Diabetes mellitus: Currently takes NovoLog sliding scale 3 times daily (2 units for every 50 glucose greater than 150).  He was previously on Lantus, however he had stopped this medication due to good glucose control  Checks his glucose 3 times per day and average has been 126.  This morning his glucose was 130.  He uses sliding scale insulin rarely  He has been staying very active for exercise and watching diet.  Up-to-date on diabetic eye exams  Currently takes atorvastatin 40 mg daily and Vascepa 2 g twice daily for hyperlipidemia  Hypertension: Currently take spironolactone, lisinopril, chlorthalidone, metoprolol and amlodipine.  No elevated BP readings out of the office.  Denies any headache or lightheadedness  He has a history of stage IIIa chronic kidney disease.  He saw nephrology several years ago and was told that no follow-up was needed  We reviewed his recent lab results from 6/23/2025 which showed the following  Lipid panel: Total cholesterol 90, LDL 38, triglycerides 266  CMP: BUN/creatinine 41/1.91.  GFR 39  CBC: Mild anemia with hemoglobin of 12.8          Review of Systems   Constitutional:  Negative for fever.   Respiratory:  Negative for cough.    Cardiovascular:  Negative for chest pain and leg swelling.   Neurological:  Negative for dizziness, numbness and headaches.       Objective   /66   Pulse 60   Wt 103 kg (228 lb)   SpO2 94%   BMI 35.71 kg/m²      Physical Exam  Vitals reviewed.   Constitutional:       General: He is not in acute distress.     Appearance: Normal appearance. He is well-developed.   HENT:      Head: Normocephalic.   Eyes:      Conjunctiva/sclera: Conjunctivae normal.   Cardiovascular:      Rate and Rhythm: Normal rate and regular rhythm.      Heart sounds: Normal heart sounds.   Pulmonary:      Effort: Pulmonary effort is normal.      Breath sounds: Normal breath sounds.   Musculoskeletal:      Right lower leg: No edema.      Left lower leg: No edema.   Skin:     Findings: No rash.   Neurological:      Mental Status: He is alert.   Psychiatric:         Mood and Affect: Mood normal.         Behavior: Behavior normal.         Assessment/Plan   Assessment & Plan  Type 2 diabetes mellitus without complication, with long-term current use of insulin    Orders:    POCT glycosylated hemoglobin (Hb A1C) manually resulted    CBC and Auto Differential; Future    Basic Metabolic Panel; Future    Vitamin B12; Future    Iron and TIBC; Future    Anemia, unspecified type    Orders:    CBC and Auto Differential; Future    Basic Metabolic Panel; Future    Vitamin B12; Future    Iron and TIBC; Future    Stage 3a chronic kidney disease (Multi)    Orders:    CBC and Auto Differential; Future    Basic Metabolic Panel; Future    Vitamin B12; Future    Iron and TIBC; Future    Hyperlipidemia, unspecified hyperlipidemia type         Hypertension, unspecified type         Diabetes mellitus: A1c today is at goal at 6.8%.  Continue with NovoLog sliding scale and follow-up in 4 months for recheck  Hypertension: This is well-controlled with blood pressure of 107/66.  Continue current meds  Hyperlipidemia: Stable with last LDL of 38.  Continue atorvastatin and Vascepa and discussed diet modification and will recheck this again in 1 year  Stage III chronic kidney disease: His last GFR was 39, which is slightly lower than his baseline (last GFR checked 1 year ago was 46).   We will recheck this again in 1 month  His labs also did show a mild anemia with hemoglobin of 12.8.  This had previously been 13.7 when checked 1 year ago.  We will recheck a CBC along with iron and B12 levels and evaluate further if still abnormal  Follow-up in 4 months for recheck

## 2025-07-07 NOTE — ASSESSMENT & PLAN NOTE
Orders:    CBC and Auto Differential; Future    Basic Metabolic Panel; Future    Vitamin B12; Future    Iron and TIBC; Future

## 2025-07-07 NOTE — ASSESSMENT & PLAN NOTE
Orders:    POCT glycosylated hemoglobin (Hb A1C) manually resulted    CBC and Auto Differential; Future    Basic Metabolic Panel; Future    Vitamin B12; Future    Iron and TIBC; Future

## 2025-07-17 DIAGNOSIS — E11.9 TYPE 2 DIABETES MELLITUS WITHOUT COMPLICATION, WITH LONG-TERM CURRENT USE OF INSULIN: ICD-10-CM

## 2025-07-17 DIAGNOSIS — Z79.4 TYPE 2 DIABETES MELLITUS WITHOUT COMPLICATION, WITH LONG-TERM CURRENT USE OF INSULIN: ICD-10-CM

## 2025-07-17 RX ORDER — DEXTROSE 4 G
1 TABLET,CHEWABLE ORAL DAILY
Qty: 1 EACH | Refills: 0 | Status: SHIPPED | OUTPATIENT
Start: 2025-07-17

## 2025-07-17 NOTE — TELEPHONE ENCOUNTER
Rx Refill Request Telephone Encounter    Name:  Diaz Manuel  :  [id number]  Medication Name:  blood-glucose meter misc [[id number]]             Specific Pharmacy location:    [facility] [address] [place] [address] & [address] [address]  Phone: [phone]  Fax: [phone]  LANCE #: [id number]     Best number to reach patient:  [phone]          Pt current rx will no longer be covered by insurance([employer])

## 2025-08-17 DIAGNOSIS — I10 HYPERTENSION, UNSPECIFIED TYPE: ICD-10-CM

## 2025-08-18 DIAGNOSIS — I10 HYPERTENSION, UNSPECIFIED TYPE: ICD-10-CM

## 2025-08-18 RX ORDER — AMLODIPINE BESYLATE 10 MG/1
10 TABLET ORAL DAILY
Qty: 90 TABLET | Refills: 3 | Status: SHIPPED | OUTPATIENT
Start: 2025-08-18 | End: 2025-08-18 | Stop reason: SDUPTHER

## 2025-08-18 RX ORDER — LISINOPRIL 40 MG/1
40 TABLET ORAL DAILY
Qty: 90 TABLET | Refills: 3 | Status: SHIPPED | OUTPATIENT
Start: 2025-08-18

## 2025-08-18 RX ORDER — AMLODIPINE BESYLATE 10 MG/1
10 TABLET ORAL DAILY
Qty: 90 TABLET | Refills: 3 | Status: SHIPPED | OUTPATIENT
Start: 2025-08-18

## 2025-08-20 DIAGNOSIS — I10 HYPERTENSION, UNSPECIFIED TYPE: ICD-10-CM

## 2025-08-20 RX ORDER — CHLORTHALIDONE 25 MG/1
25 TABLET ORAL DAILY
Qty: 90 TABLET | Refills: 3 | Status: SHIPPED | OUTPATIENT
Start: 2025-08-20

## 2025-10-08 ENCOUNTER — APPOINTMENT (OUTPATIENT)
Dept: PRIMARY CARE | Facility: CLINIC | Age: 65
End: 2025-10-08
Payer: COMMERCIAL